# Patient Record
Sex: MALE | Race: WHITE | NOT HISPANIC OR LATINO | Employment: OTHER | URBAN - METROPOLITAN AREA
[De-identification: names, ages, dates, MRNs, and addresses within clinical notes are randomized per-mention and may not be internally consistent; named-entity substitution may affect disease eponyms.]

---

## 2019-12-31 ENCOUNTER — THERAPY VISIT (OUTPATIENT)
Dept: PHYSICAL THERAPY | Facility: CLINIC | Age: 24
End: 2019-12-31
Payer: COMMERCIAL

## 2019-12-31 DIAGNOSIS — M25.511 CHRONIC RIGHT SHOULDER PAIN: Primary | ICD-10-CM

## 2019-12-31 DIAGNOSIS — G89.29 CHRONIC RIGHT SHOULDER PAIN: Primary | ICD-10-CM

## 2019-12-31 PROCEDURE — 97161 PT EVAL LOW COMPLEX 20 MIN: CPT | Mod: GP | Performed by: PHYSICAL THERAPIST

## 2019-12-31 PROCEDURE — 97530 THERAPEUTIC ACTIVITIES: CPT | Mod: GP | Performed by: PHYSICAL THERAPIST

## 2019-12-31 PROCEDURE — 97110 THERAPEUTIC EXERCISES: CPT | Mod: GP | Performed by: PHYSICAL THERAPIST

## 2019-12-31 NOTE — LETTER
[facility] ATHLETIC [facility] PHYSICAL THERAPY  [address]  [facility] [address]  [phone]    January 10, 2020    Re: Chago Soni   :   1995  MRN:  [id number]   REFERRING PHYSICIAN:   Farzana Guzman    [facility] ATHLETIC [facility] PHYSICAL THERAPY  Date of Initial Evaluation:  2019  Visits:    1  Reason for Referral:  Chronic right shoulder pain    [facility] Athletic [facility] Initial Evaluation -- Upper Extremity  Evaluation Date: 2019  Chago Soni is a 24 year old male with a R shd condition.   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 He denies CS pain, stiffness.   Pain quality (sharp/shooting/stabbing/aching/burning/cramping):    Present since (onset date):  Over a year ago while in [employer]. MD order [id number].   Symptoms (improving/unchanging/worsening):  Worsening slowly   Symptoms commenced as a result of: carrying heavy packs on R arm.  Condition occurred in the following environment: unknown  Symptoms at onset:  As above   Paresthesia (yes/no):  no  Spinal history: none     Cough/Sneeze (pos/neg):  neg  Constant symptoms: yes  Intermittent symptoms:   Symptoms are worse with the following: Other - shd retraction, extension, IR, out to side, OH reach- always.    Symptoms are better with the following: nothing  Continued use makes the pain (better/worse/no effect):   Disturbed night (yes/no):  no  Pain at rest (yes/no):     Site (neck/shoulder/elbow/wrist/hand): shd  Other questions (swelling/catching/clicking/locking/subluxing):  ACJ clicking  ocassionally  Previous episodes: none  Previous treatments: 3 different chiros   Re: Chago Soni   :   1995    Specific Questions:  General health (excellent/good/fair/poor): good  Pertinent medical history includes: None  Medications (nil/NSAIDS/analg/steroids/anticoag/other):  None  Medical allergies:  none  Imaging (None/Xray/MRI/Other): none  Recent or major surgery (yes/no): no  Night pain (yes/no): no  Accidents (yes/no): no  Unexplained weight loss (yes/no): no  Barriers at home: none  Other red flags: none    Sites for physical examination (neck/shoulder/elbow/wrist/hand): shd    EXAMINATION    Posture:  Sitting (good/fair/poor): good   Correction of posture (better/worse/no effect/NA): na  Standing (good/fair/poor): good  Other observations: R glenoid sits anterior   Neurological (NA/motor/sensory/reflexes/dural): na  Baselines (pain or functional activity): retraction - limited tight  Extremities (Shoulder/Elbow/Wrist/Hand): shd  Movement Loss Jb Mod Min Nil Pain   Flexion  153/180   GHJ elevation   Extension  46/53      Abduction  162/180      Internal Rotation  2 inches distal to L      External Rotation  Min loss, dec scap ret      Supination        Pronation        Radial Deviation        Ulnar Deviation        Passive Movement (+/- overpressure)/(PDM/ERP): wnl except IR 90/120  Resisted Test Response (pain): na no time  Other Tests:     Re: Chago Soni   :   1995    Spine:  Movement Loss:   Effect of repeated movements:   Effect of static positioning:   Spine testing (not relevant/relevant/secondary problem): na    Baseline Symptoms: 0/10  Repeated Tests Symptom Response Mechanical Response   Active/Passive movement, resisted test, functional test During - Produce, Abolish, Increase, Decrease, NE After -   Better, Worse, NB, NW, NE Effect -   ? or ? ROM, strength or key functional test No Effect   Stand rep shd extension using counter 2/15 hold 3 sec No Effect  Inc ROM  Better     Inc all ROM: FF to 162 ext 64 abd 162 and IR 1.75 inch loss    Effect of static positioning       Provisional Classification (Extremity/Spine): extremity derangement- shd   Principle of Management:  Education:  Etiology DP pattern #1 HEP POC proper sitting with shd ret using antonella roll placed vertically BB  Equipment provided:  Towel roll  Exercise and dosage:  Stand rep shd ext using counter 1/15 q 2-3 hrs    ASSESSMENT/PLAN:  Patient is a 24 year old male with right side shoulder complaints.    Patient has the following significant findings with corresponding treatment plan.                Diagnosis 1:  shd pain  Pain -  manual therapy, self management, education, directional preference exercise and home program  Decreased ROM/flexibility - manual therapy, therapeutic exercise, therapeutic activity and home program  Decreased joint mobility - manual therapy, therapeutic exercise, therapeutic activity and   home program  Decreased function - therapeutic activities and home program  Impaired posture - neuro re-education, therapeutic activities and home program  Previous and current functional limitations:  (See Goal Flow Sheet for this information)    Short term and Long term goals: (See Goal Flow Sheet for this information)   Communication ability:  Patient appears to be able to clearly communicate and understand verbal and written communication and follow directions correctly.  Treatment Explanation - The following has been discussed with the patient:   RX ordered/plan of care, Anticipated outcomes, Possible risks and side effects              Re: Chago Soni   :   1995    This patient would benefit from PT intervention to resume normal activities.   Rehab potential is good.  Frequency:  1 X week, once daily  Duration:  for 6 weeks  Discharge Plan:  Achieve all LTG.  Independent in home treatment program.  Reach maximal therapeutic benefit.    Thank you for your referral.    INQUIRIES        Juliane  Sb PT, cert. MDT  [facility] [place] [facility]  [address] [facility] [address]  [place] [address]  Phone: [phone]  Fax: [phone]

## 2019-12-31 NOTE — PROGRESS NOTES
[facility] Initial Evaluation -- Upper Extremity    Evaluation Date: December 31, 2019  Chago Soni is a 24 year old male with a R shd condition.   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 He denies CS pain, stiffness.     Pain quality (sharp/shooting/stabbing/aching/burning/cramping):      Present since (onset date):  Over a year ago while in [employer]. MD order [id number].   Symptoms (improving/unchanging/worsening):  Worsening slowly     Symptoms commenced as a result of: carrying heavy packs on R arm.  Condition occurred in the following environment: unknown    Symptoms at onset:  As above Paresthesia (yes/no):  no  Spinal history: none   Cough/Sneeze (pos/neg):  neg    Constant symptoms: yes  Intermittent symptoms:     Symptoms are worse with the following: Other - shd retraction, extension, IR, out to side, OH reach- always.    Symptoms are better with the following: nothing    Continued use makes the pain (better/worse/no effect):     Disturbed night (yes/no):  no    Pain at rest (yes/no):   Site (neck/shoulder/elbow/wrist/hand): shd    Other questions (swelling/catching/clicking/locking/subluxing):  ACJ clicking ocassionally    Previous episodes: none  Previous treatments: 3 different chiros     Specific Questions:  General health (excellent/good/fair/poor): good  Pertinent medical history includes: None  Medications (nil/NSAIDS/analg/steroids/anticoag/other):  None  Medical allergies:  none  Imaging (None/Xray/MRI/Other): none  Recent or major surgery (yes/no): no  Night pain (yes/no): no  Accidents  (yes/no): no  Unexplained weight loss (yes/no): no  Barriers at home: none  Other red flags: none    Sites for physical examination (neck/shoulder/elbow/wrist/hand): shd    EXAMINATION    Posture:  Sitting (good/fair/poor): good   Correction of posture (better/worse/no effect/NA): na  Standing (good/fair/poor): good  Other observations: R glenoid sits anterior     Neurological (NA/motor/sensory/reflexes/dural): na    Baselines (pain or functional activity): retraction - limited tight    Extremities (Shoulder/Elbow/Wrist/Hand): shd    Movement Loss Jb Mod Min Nil Pain   Flexion  153/180   GHJ elevation   Extension  46/53      Abduction  162/180      Internal Rotation  2 inches distal to L      External Rotation  Min loss, dec scap ret      Supination        Pronation        Radial Deviation        Ulnar Deviation           Passive Movement (+/- overpressure)/(PDM/ERP): wnl except IR 90/120  Resisted Test Response (pain): na no time  Other Tests:     Spine:  Movement Loss:   Effect of repeated movements:   Effect of static positioning:   Spine testing (not relevant/relevant/secondary problem): na    Baseline Symptoms: 0/10  Repeated Tests Symptom Response Mechanical Response   Active/Passive movement, resisted test, functional test During - Produce, Abolish, Increase, Decrease, NE After -   Better, Worse, NB, NW, NE Effect -   ? or ? ROM, strength or key functional test No Effect   Stand rep shd extension using counter 2/15 hold 3 sec No Effect  Inc ROM  Better    Inc all ROM: FF to 162 ext 64 abd 162 and IR 1.75 inch loss                               Effect of static positioning                    Provisional Classification (Extremity/Spine): extremity derangement- shd     Principle of Management:  Education:  Etiology DP pattern #1 HEP POC proper sitting with shd ret using antonella roll placed vertically BB  Equipment provided:  Towel roll  Exercise and dosage:  Stand rep shd ext using counter 1/15 q 2-3  hrs    ASSESSMENT/PLAN:    Patient is a 24 year old male with right side shoulder complaints.    Patient has the following significant findings with corresponding treatment plan.                Diagnosis 1:  shd pain  Pain -  manual therapy, self management, education, directional preference exercise and home program  Decreased ROM/flexibility - manual therapy, therapeutic exercise, therapeutic activity and home program  Decreased joint mobility - manual therapy, therapeutic exercise, therapeutic activity and home program  Decreased function - therapeutic activities and home program  Impaired posture - neuro re-education, therapeutic activities and home program        Previous and current functional limitations:  (See Goal Flow Sheet for this information)    Short term and Long term goals: (See Goal Flow Sheet for this information)     Communication ability:  Patient appears to be able to clearly communicate and understand verbal and written communication and follow directions correctly.  Treatment Explanation - The following has been discussed with the patient:   RX ordered/plan of care  Anticipated outcomes  Possible risks and side effects  This patient would benefit from PT intervention to resume normal activities.   Rehab potential is good.    Frequency:  1 X week, once daily  Duration:  for 6 weeks  Discharge Plan:  Achieve all LTG.  Independent in home treatment program.  Reach maximal therapeutic benefit.    Please refer to the daily flowsheet for treatment today, total treatment time and time spent performing 1:1 timed codes.

## 2020-01-03 PROBLEM — M25.511 CHRONIC RIGHT SHOULDER PAIN: Status: ACTIVE | Noted: 2020-01-03

## 2020-01-03 PROBLEM — G89.29 CHRONIC RIGHT SHOULDER PAIN: Status: ACTIVE | Noted: 2020-01-03

## 2020-03-23 PROBLEM — M25.511 CHRONIC RIGHT SHOULDER PAIN: Status: RESOLVED | Noted: 2020-01-03 | Resolved: 2020-03-23

## 2020-03-23 PROBLEM — G89.29 CHRONIC RIGHT SHOULDER PAIN: Status: RESOLVED | Noted: 2020-01-03 | Resolved: 2020-03-23

## 2020-08-25 ENCOUNTER — THERAPY VISIT (OUTPATIENT)
Dept: PHYSICAL THERAPY | Facility: CLINIC | Age: 25
End: 2020-08-25
Attending: FAMILY MEDICINE
Payer: OTHER GOVERNMENT

## 2020-08-25 ENCOUNTER — OFFICE VISIT (OUTPATIENT)
Dept: FAMILY MEDICINE | Facility: CLINIC | Age: 25
End: 2020-08-25
Payer: OTHER GOVERNMENT

## 2020-08-25 VITALS
TEMPERATURE: 98 F | WEIGHT: 176 LBS | HEIGHT: 71 IN | HEART RATE: 70 BPM | DIASTOLIC BLOOD PRESSURE: 68 MMHG | BODY MASS INDEX: 24.64 KG/M2 | SYSTOLIC BLOOD PRESSURE: 118 MMHG

## 2020-08-25 DIAGNOSIS — M25.511 CHRONIC RIGHT SHOULDER PAIN: ICD-10-CM

## 2020-08-25 DIAGNOSIS — J30.2 SEASONAL ALLERGIC RHINITIS, UNSPECIFIED TRIGGER: ICD-10-CM

## 2020-08-25 DIAGNOSIS — L30.9 ECZEMA, UNSPECIFIED TYPE: ICD-10-CM

## 2020-08-25 DIAGNOSIS — M25.511 RIGHT SHOULDER PAIN, UNSPECIFIED CHRONICITY: ICD-10-CM

## 2020-08-25 DIAGNOSIS — Z11.3 SCREEN FOR STD (SEXUALLY TRANSMITTED DISEASE): ICD-10-CM

## 2020-08-25 DIAGNOSIS — G89.29 CHRONIC RIGHT SHOULDER PAIN: ICD-10-CM

## 2020-08-25 DIAGNOSIS — Z00.00 ROUTINE GENERAL MEDICAL EXAMINATION AT A HEALTH CARE FACILITY: Primary | ICD-10-CM

## 2020-08-25 PROCEDURE — 36415 COLL VENOUS BLD VENIPUNCTURE: CPT | Performed by: FAMILY MEDICINE

## 2020-08-25 PROCEDURE — 87340 HEPATITIS B SURFACE AG IA: CPT | Performed by: FAMILY MEDICINE

## 2020-08-25 PROCEDURE — 87491 CHLMYD TRACH DNA AMP PROBE: CPT | Performed by: FAMILY MEDICINE

## 2020-08-25 PROCEDURE — 99214 OFFICE O/P EST MOD 30 MIN: CPT | Mod: 25 | Performed by: FAMILY MEDICINE

## 2020-08-25 PROCEDURE — 97112 NEUROMUSCULAR REEDUCATION: CPT | Mod: GP | Performed by: PHYSICAL THERAPIST

## 2020-08-25 PROCEDURE — 87591 N.GONORRHOEAE DNA AMP PROB: CPT | Performed by: FAMILY MEDICINE

## 2020-08-25 PROCEDURE — 97161 PT EVAL LOW COMPLEX 20 MIN: CPT | Mod: GP | Performed by: PHYSICAL THERAPIST

## 2020-08-25 PROCEDURE — 87389 HIV-1 AG W/HIV-1&-2 AB AG IA: CPT | Performed by: FAMILY MEDICINE

## 2020-08-25 PROCEDURE — 86780 TREPONEMA PALLIDUM: CPT | Performed by: FAMILY MEDICINE

## 2020-08-25 PROCEDURE — 99385 PREV VISIT NEW AGE 18-39: CPT | Performed by: FAMILY MEDICINE

## 2020-08-25 RX ORDER — TRIAMCINOLONE ACETONIDE 1 MG/G
CREAM TOPICAL 2 TIMES DAILY
Qty: 85.2 G | Refills: 1 | Status: SHIPPED | OUTPATIENT
Start: 2020-08-25 | End: 2021-02-15

## 2020-08-25 ASSESSMENT — MIFFLIN-ST. JEOR: SCORE: 1805.46

## 2020-08-25 NOTE — PROGRESS NOTES
3  SUBJECTIVE:   CC: Chago Soni is an 25 year old male who presents for preventive health visit.     Healthy Habits:    Do you get at least three servings of calcium containing foods daily (dairy, green leafy vegetables, etc.)? yes    Amount of exercise or daily activities, outside of work: 7 day(s) per week    Problems taking medications regularly No    Medication side effects: No    Have you had an eye exam in the past two years? yes    Do you see a dentist twice per year? yes    Do you have sleep apnea, excessive snoring or daytime drowsiness?no    Was donating plasma at [facility]. Was told he had a  False positive Hepatitis C. Would like retesting today.     [employer]iginally [place]    seasal allergies, over-the-counter meds -managing it well      Joint Pain    Onset: at least two years, began with tension. Was seen for it about 1.5 years ago. Saw PT. Insurance changed and he didn't continue following up. Now works for [employer] full time and PT can be covered by [employer].     Description:   Location: right shoulder  Character: usually its an ache, forward and upwards he feels like he gets stuck ,     Intensity: moderate, issue is becoming severe     Progression of Symptoms: worse    Accompanying Signs & Symptoms:  Other symptoms: will radiate into nbeck in down into upper pectoral     History:   Previous similar pain: YES      Precipitating factors:   Trauma or overuse: no     Alleviating factors:  Improved by: not much has helped.     Right shoulder rotated up and stuck    No real injury  Overtime  Right handed  Pain more on the top of shoulder , sometimes on the pec and also   range of motion is affected , backward rotation, lorenzo back    He Is weightlifting  He has been doing tones of back and shoulder exercises      Shots uptdate    Today's PHQ-2 Score:   PHQ-2 ( 1999 [employer]) 8/25/2020   Q1: Little interest or pleasure in doing things 0   Q2: Feeling down, depressed or hopeless 0  "  PHQ-2 Score 0       Abuse: Current or Past(Physical, Sexual or Emotional)- No  Do you feel safe in your environment? Yes        Social History     Tobacco Use     Smoking status: Never Smoker     Smokeless tobacco: Never Used   Substance Use Topics     Alcohol use: Yes     If you drink alcohol do you typically have >3 drinks per day or >7 drinks per week? No                      Last PSA: No results found for: PSA    Reviewed orders with patient. Reviewed health maintenance and updated orders accordingly - Yes  Labs reviewed in EPIC    Reviewed and updated as needed this visit by clinical staff  Tobacco  Allergies  Med Hx  Surg Hx  Fam Hx  Soc Hx        Reviewed and updated as needed this visit by Provider        History reviewed. No pertinent past medical history.   History reviewed. No pertinent surgical history.  OB History   No obstetric history on file.       ROS:  CONSTITUTIONAL: NEGATIVE for fever, chills, change in weight  INTEGUMENTARY/SKIN: NEGATIVE for worrisome rashes, moles or lesions  EYES: NEGATIVE for vision changes or irritation  ENT: NEGATIVE for ear, mouth and throat problems  RESP: NEGATIVE for significant cough or SOB  CV: NEGATIVE for chest pain, palpitations or peripheral edema  GI: NEGATIVE for nausea, abdominal pain, heartburn, or change in bowel habits   male: negative for dysuria, hematuria, decreased urinary stream, erectile dysfunction, urethral discharge  MUSCULOSKELETAL: NEGATIVE for significant arthralgias or myalgia  NEURO: NEGATIVE for weakness, dizziness or paresthesias  PSYCHIATRIC: NEGATIVE for changes in mood or affect    OBJECTIVE:   /68   Pulse 70   Temp 98  F (36.7  C) (Oral)   Ht 1.803 m (5' 11\")   Wt 79.8 kg (176 lb)   BMI 24.55 kg/m    EXAM:  GENERAL: healthy, alert and no distress  EYES: Eyes grossly normal to inspection, PERRL and conjunctivae and sclerae normal  HENT: ear canals and TM's normal, nose and mouth without ulcers or lesions  NECK: no " "adenopathy, no asymmetry, masses, or scars and thyroid normal to palpation  RESP: lungs clear to auscultation - no rales, rhonchi or wheezes  CV: regular rate and rhythm, normal S1 S2, no S3 or S4, no murmur, click or rub, no peripheral edema and peripheral pulses strong  ABDOMEN: soft, nontender, no hepatosplenomegaly, no masses and bowel sounds normal  MS: right shoulder restrictions with motion, normal strength, no numbness, no gross musculoskeletal defects noted, no edema  SKIN: mild patches of rash on arms, no suspicious lesions or rashes  NEURO: Normal strength and tone, mentation intact and speech normal  PSYCH: mentation appears normal, affect normal/bright    Diagnostic Test Results:  Labs reviewed in Epic    ASSESSMENT/PLAN:       ICD-10-CM    1. Routine general medical examination at a health care facility  Z00.00    2. Right shoulder pain, unspecified chronicity  M25.511 [facility]     Orthopedic & Spine  Referral   3. Screen for STD (sexually transmitted disease)  Z11.3 **Hepatitis C Screen Reflex to RNA FUTURE anytime     Treponema Abs w Reflex to RPR and Titer     Chlamydia trachomatis PCR     Neisseria gonorrhoeae PCR     HIV Antigen Antibody Combo     Hepatitis B surface antigen   4. Eczema, unspecified type  L30.9    5. Seasonal allergic rhinitis, unspecified trigger  J30.2      New to this clinic and provider  Chronic shoulder pain/decrease range of motion-no acute pain, start with physical therapy and follow up with  Sports med doc    History of eczma-advised moisturizers and use prn hydrocortisone , not resolving so will do triamcinolone  Std screening  Seasonal allergies-stable, cont over-the-counter meds      COUNSELING:  Reviewed preventive health counseling, as reflected in patient instructions    Estimated body mass index is 24.55 kg/m  as calculated from the following:    Height as of this encounter: 1.803 m (5' 11\").    Weight as of this encounter: " 79.8 kg (176 lb).         reports that he has never smoked. He has never used smokeless tobacco.      Counseling Resources:  ATP IV Guidelines  Pooled Cohorts Equation Calculator  FRAX Risk Assessment  ICSI Preventive Guidelines  Dietary Guidelines for Americans, 2010  USDA's MyPlate  ASA Prophylaxis  Lung CA Screening    Marlon Bowser DO  [facility]

## 2020-08-25 NOTE — LETTER
[facility]  [address] [id number] [phone]                                                                                                August 31, 2020    Chago Soni  [address] [address]        Dear Mr. Soni,    Your recent lab results were within normal limits. A copy of those results are included with this letter.        Sincerely,      Marlon Bowser DO/hl    Results for orders placed or performed in visit on 08/25/20   Treponema Abs w Reflex to RPR and Titer     Status: None   Result Value Ref Range    Treponema Antibodies Nonreactive NR^Nonreactive   HIV Antigen Antibody Combo     Status: None   Result Value Ref Range    HIV Antigen Antibody Combo Nonreactive NR^Nonreactive       Hepatitis B surface antigen     Status: None   Result Value Ref Range    Hep B Surface Agn Nonreactive NR^Nonreactive   Chlamydia trachomatis PCR     Status: None    Specimen: Urine   Result Value Ref Range    Specimen Description Urine     Chlamydia Trachomatis PCR Negative NEG^Negative   Neisseria gonorrhoeae PCR     Status: None    Specimen: Urine   Result Value Ref Range    Specimen Descrip Urine     N Gonorrhea PCR Negative NEG^Negative

## 2020-08-25 NOTE — PROGRESS NOTES
"[facility] Initial Evaluation  Subjective:  The history is provided by the patient. No  was used.   Therapist Generated HPI Evaluation  Problem details: Pt reports about two years of shoulder pain without specific incident.  Feels like shoulder is \"rotated forward and up a little bit.\"  Feels like ROM is limited and is \"stuck.\"  Does quite a bit of weight lifting and has difficulty with both front side and back side exercises.  Referred to PT 08/25/2020.         Type of problem:  Right shoulder.    This is a chronic condition.  Condition occurred with:  Unknown cause.    Patient reports pain:  Anterior.        Exacerbated by: lifting, carrying, reaching behind the back.  Relieved by: pec stretch, scapular exercises.          Patient Health History  Chago Soni being seen for shoulder misalignment.       Problem occurred:  training   Pain is reported as 2/10 on pain scale.  General health as reported by patient is excellent.  Pertinent medical history includes: none.   Red flags:  None as reported by patient.  Medical allergies: none.   Surgeries include:  None.    Current medications:  None.    Current occupation is .   Primary job tasks include:  Driving and lifting/carrying.                Pt is R dominant.  Pt states his goals are to \"not feel the discomfort.\"                    Objective:  Standing Alignment:    Cervical/Thoracic:  Forward head  Shoulder/UE:  Rounded shoulders (medial border winging R scapula compared to L)                                  Cervical/Thoracic Evaluation  Arom wnl cervical: without gross restriction or symptom provocation.                                  Shoulder Evaluation:  ROM:  AROM:    Flexion:  Left:  165    Right:  140 \"pinching\"  Extension: Left: 59Right: 53 positive  Abduction:  Left: 155   Right:  133 \"tiny bit of a pinch\"    Internal Rotation:  Left:  T6    Right:  T9  External Rotation:  Left:  50    Right:  " "50 \"like it has less motion\"                PROM:    Flexion:  Right: 150 \"like I couldn't do it\"      Abduction:  Right:  150 with discomfort                          Strength:    Flexion: Left:5/5    Pain: -    Right: 5/5      Pain:  -  Extension:  Left: 5/5     Pain:-    Right: 5/5     Pain:-  Abduction:  Left: 5/5   Pain:-    Right:/5     Pain:-    Internal Rotation:  Left:5/5      Pain:-    Right: 5/5      Pain:-  External Rotation:   Left:5/5      Pain:-   Right:5-/5      Pain:-            Stability Testing:      Left shoulder stability negative testing:  Apprehension and Relocation    Right shoulder stability negative testing:  Apprehension and Relocation    Palpation:  normal      Mobility Tests:    Glenohumeral anterior left:  Normal  Glenohumeral anterior right:  Normal  Glenohumeral posterior left:  Normal  Glenohumeral posterior right:  Hypomobile  Glenohumeral inferior left:  Normal  Glenohumeral inferior right:  Normal                                         Serratus anterior MMT L 5/5, R 4/5 without discomfort    General     ROS    Assessment/Plan:    Patient is a 25 year old male with right side shoulder complaints.    Patient has the following significant findings with corresponding treatment plan.                Diagnosis 1:  R shoulder pain with serratus anterior weakness  Pain -  hot/cold therapy  Decreased ROM/flexibility - manual therapy and therapeutic exercise  Decreased strength - therapeutic exercise and therapeutic activities  Impaired muscle performance - neuro re-education  Decreased function - therapeutic activities  Impaired posture - neuro re-education    Therapy Evaluation Codes:   1) History comprised of:   Personal factors that impact the plan of care:      Overall behavior pattern and Time since onset of symptoms.    Comorbidity factors that impact the plan of care are:      None.     Medications impacting care: None.  2) Examination of Body Systems comprised of:   Body structures " and functions that impact the plan of care:      Shoulder.   Activity limitations that impact the plan of care are:      Lifting.  3) Clinical presentation characteristics are:   Stable/Uncomplicated.  4) Decision-Making    Low complexity using standardized patient assessment instrument and/or measureable assessment of functional outcome.  Cumulative Therapy Evaluation is: Low complexity.    Previous and current functional limitations:  (See Goal Flow Sheet for this information)    Short term and Long term goals: (See Goal Flow Sheet for this information)     Communication ability:  Patient appears to be able to clearly communicate and understand verbal and written communication and follow directions correctly.  Treatment Explanation - The following has been discussed with the patient:   RX ordered/plan of care  Anticipated outcomes  Possible risks and side effects  This patient would benefit from PT intervention to resume normal activities.   Rehab potential is good.    Frequency:  1 X week, once daily  Duration:  for 6 weeks  Discharge Plan:  Achieve all LTG.  Independent in home treatment program.  Reach maximal therapeutic benefit.    Please refer to the daily flowsheet for treatment today, total treatment time and time spent performing 1:1 timed codes.

## 2020-08-25 NOTE — PATIENT INSTRUCTIONS
HPV shots  Flu shot in the fall  Follow up with physical therapy and sports med  Consider going to [facility] for physical therapy if this place isnt working out for you  Marlon Bowser D.O.    Patient Education         Preventive Health Recommendations  Male Ages 21 - 25     Yearly exam:             See your health care provider every year in order to  o   Review health changes.   o   Discuss preventive care.    o   Review your medicines if your doctor has prescribed any.    You should be tested each year for STDs (sexually transmitted diseases).     Talk to your provider about cholesterol testing.      If you are at risk for diabetes, you should have a diabetes test (fasting glucose).    Shots: Get a flu shot each year. Get a tetanus shot every 10 years.     Nutrition:    Eat at least 5 servings of fruits and vegetables daily.     Eat whole-grain bread, whole-wheat pasta and brown rice instead of white grains and rice.     Get adequate calcium and Vitamin D.     Lifestyle    Exercise for at least 150 minutes a week (30 minutes a day, 5 days a week). This will help you control your weight and prevent disease.     Limit alcohol to one drink per day.     No smoking.     Wear sunscreen to prevent skin cancer.     See your dentist every six months for an exam and cleaning.

## 2020-08-26 LAB
C TRACH DNA SPEC QL NAA+PROBE: NEGATIVE
HBV SURFACE AG SERPL QL IA: NONREACTIVE
HIV 1+2 AB+HIV1 P24 AG SERPL QL IA: NONREACTIVE
N GONORRHOEA DNA SPEC QL NAA+PROBE: NEGATIVE
SPECIMEN SOURCE: NORMAL
SPECIMEN SOURCE: NORMAL
T PALLIDUM AB SER QL: NONREACTIVE

## 2020-08-31 ENCOUNTER — THERAPY VISIT (OUTPATIENT)
Dept: PHYSICAL THERAPY | Facility: CLINIC | Age: 25
End: 2020-08-31
Payer: OTHER GOVERNMENT

## 2020-08-31 DIAGNOSIS — G89.29 CHRONIC RIGHT SHOULDER PAIN: ICD-10-CM

## 2020-08-31 DIAGNOSIS — M25.511 CHRONIC RIGHT SHOULDER PAIN: ICD-10-CM

## 2020-08-31 PROCEDURE — 97112 NEUROMUSCULAR REEDUCATION: CPT | Mod: GP | Performed by: PHYSICAL THERAPIST

## 2020-09-09 ENCOUNTER — THERAPY VISIT (OUTPATIENT)
Dept: PHYSICAL THERAPY | Facility: CLINIC | Age: 25
End: 2020-09-09
Payer: OTHER GOVERNMENT

## 2020-09-09 DIAGNOSIS — M25.511 CHRONIC RIGHT SHOULDER PAIN: ICD-10-CM

## 2020-09-09 DIAGNOSIS — G89.29 CHRONIC RIGHT SHOULDER PAIN: ICD-10-CM

## 2020-09-09 PROCEDURE — 97140 MANUAL THERAPY 1/> REGIONS: CPT | Mod: GP | Performed by: PHYSICAL THERAPIST

## 2020-09-09 PROCEDURE — 97112 NEUROMUSCULAR REEDUCATION: CPT | Mod: GP | Performed by: PHYSICAL THERAPIST

## 2020-09-09 NOTE — PROGRESS NOTES
Subjective:  HPI  Physical Exam                    Objective:  System    Physical Exam    General     ROS    Assessment/Plan:    SUBJECTIVE  Subjective: Pt reports HEP continues to get slowly easier but remains challenging.  Overall seeing some progress as generally feels a little better first thing in the morning.   Current Pain level: No change(but less often)   Changes in function:  None     Adverse reaction to treatment or activity:  None    OBJECTIVE  Objective: Decreased discomfort AROM R shoulder flexion with assistance to scapular upward rotation.     ASSESSMENT  Chago continues to require intervention to meet STG and LTG's: PT  Patient is progressing as expected.  Response to therapy has shown an improvement in symptoms on elevation with manual scapular assistance.  Progress made towards STG/LTG?  None    PLAN  Good response to manual work in clinic.  Continue, weaning manual work as able.    PTA/ATC plan:  N/A    Please refer to the daily flowsheet for treatment today, total treatment time and time spent performing 1:1 timed codes.

## 2020-10-09 PROBLEM — G89.29 CHRONIC RIGHT SHOULDER PAIN: Status: RESOLVED | Noted: 2020-08-25 | Resolved: 2020-10-09

## 2020-10-09 PROBLEM — M25.511 CHRONIC RIGHT SHOULDER PAIN: Status: RESOLVED | Noted: 2020-08-25 | Resolved: 2020-10-09

## 2020-10-09 NOTE — PROGRESS NOTES
Pt last seen in PT 09/09.  He has since no showed and not responded to phone message with no further PT scheduled.  Consider note dated 09/09 to serve as final summary.

## 2020-11-12 ENCOUNTER — OFFICE VISIT (OUTPATIENT)
Dept: FAMILY MEDICINE | Facility: CLINIC | Age: 25
End: 2020-11-12
Payer: OTHER GOVERNMENT

## 2020-11-12 VITALS
SYSTOLIC BLOOD PRESSURE: 114 MMHG | WEIGHT: 184.6 LBS | BODY MASS INDEX: 25.84 KG/M2 | DIASTOLIC BLOOD PRESSURE: 84 MMHG | HEART RATE: 59 BPM | TEMPERATURE: 96.1 F | OXYGEN SATURATION: 99 % | HEIGHT: 71 IN

## 2020-11-12 DIAGNOSIS — L98.9 SKIN LESION: Primary | ICD-10-CM

## 2020-11-12 DIAGNOSIS — L01.00 IMPETIGO: ICD-10-CM

## 2020-11-12 PROCEDURE — 87070 CULTURE OTHR SPECIMN AEROBIC: CPT | Performed by: PHYSICIAN ASSISTANT

## 2020-11-12 PROCEDURE — 87186 SC STD MICRODIL/AGAR DIL: CPT | Performed by: PHYSICIAN ASSISTANT

## 2020-11-12 PROCEDURE — 87077 CULTURE AEROBIC IDENTIFY: CPT | Performed by: PHYSICIAN ASSISTANT

## 2020-11-12 PROCEDURE — 99213 OFFICE O/P EST LOW 20 MIN: CPT | Performed by: PHYSICIAN ASSISTANT

## 2020-11-12 RX ORDER — MUPIROCIN CALCIUM 20 MG/G
CREAM TOPICAL 3 TIMES DAILY
Qty: 15 G | Refills: 11 | Status: SHIPPED | OUTPATIENT
Start: 2020-11-12 | End: 2020-12-27

## 2020-11-12 ASSESSMENT — MIFFLIN-ST. JEOR: SCORE: 1846.72

## 2020-11-12 NOTE — PROGRESS NOTES
"Subjective     Chago Soni is a 25 year old male who presents to clinic today for the following health issues:    HPI         Concern - Nose problem  Onset: 5 days ago  Description: patient has a bump on his nose and it opened up, it did have some pus and now has a scab over it. Patient does not know what causes it.   Intensity: mild  Progression of Symptoms:  worsening  Accompanying Signs & Symptoms: none  Previous history of similar problem: Yes  Precipitating factors:        Worsened by: nothing  Alleviating factors:        Improved by: nothing  Therapies tried and outcome: ointment did not help and the antibiotics did clear it up.     Patient Active Problem List   Diagnosis   (none) - all problems resolved or deleted      Current Outpatient Medications   Medication     amoxicillin-clavulanate (AUGMENTIN) 875-125 MG tablet     mupirocin (BACTROBAN) 2 % external cream     triamcinolone (KENALOG) 0.1 % external cream     No current facility-administered medications for this visit.         Review of Systems   Constitutional, HEENT, cardiovascular, pulmonary, gi and gu systems are negative, except as otherwise noted.      Objective    Pulse 59   Temp 96.1  F (35.6  C) (Tympanic)   Ht 1.807 m (5' 11.14\")   Wt 83.7 kg (184 lb 9.6 oz)   SpO2 99%   BMI 25.64 kg/m    Body mass index is 25.64 kg/m .  Physical Exam   GENERAL: healthy, alert and no distress  NECK: no adenopathy, no asymmetry, masses, or scars and thyroid normal to palpation  SKIN: Left nose he has a 1 cm honey colored crusting keratotic papule             Assessment & Plan     Skin lesion    - mupirocin (BACTROBAN) 2 % external cream; Apply topically 3 times daily (Ok to sub ointment if needed)  - amoxicillin-clavulanate (AUGMENTIN) 875-125 MG tablet; Take 1 tablet by mouth 2 times daily  - Skin Culture Aerobic Bacterial    Impetigo  Will treat. Sent for culture. This is recurrent, per him. Location in the dangerous triangle warrants oral " antibiotics. There are no signs of local cellulitis. Warning symptoms of worsening condition discussed and patient shows good understanding to seek ED care if his facial lesion progresses/changes in such a way to suggest it is becoming cellulitic               No follow-ups on file.    DARIELA TILLMAN PA-C  [facility]

## 2020-11-12 NOTE — LETTER
November 19, 2020      Chago oSni  [address] [id number]          Ross,     The culture grew out nice boring skin bacteria. So, no need for major concerns.     Let me know if you have any questions.     Thanks.   BROOKE Molina, PA-C/pv     Resulted Orders   Skin Culture Aerobic Bacterial   Result Value Ref Range    Specimen Description Skin Nose Left     Special Requests Specimen collected in eSwab transport (white cap)     Culture Micro Moderate growth  Staphylococcus aureus   (A)     Culture Micro (A)      Moderate growth  Staphylococcus epidermidis  Susceptibility testing not routinely done         If you have any questions or concerns, please call the clinic at the number listed above.

## 2020-11-15 LAB
BACTERIA SPEC CULT: ABNORMAL
BACTERIA SPEC CULT: ABNORMAL
Lab: ABNORMAL
SPECIMEN SOURCE: ABNORMAL

## 2020-12-27 ENCOUNTER — OFFICE VISIT (OUTPATIENT)
Dept: URGENT CARE | Facility: URGENT CARE | Age: 25
End: 2020-12-27
Payer: OTHER GOVERNMENT

## 2020-12-27 VITALS
BODY MASS INDEX: 26.34 KG/M2 | RESPIRATION RATE: 16 BRPM | OXYGEN SATURATION: 97 % | SYSTOLIC BLOOD PRESSURE: 125 MMHG | TEMPERATURE: 97.5 F | WEIGHT: 189.6 LBS | DIASTOLIC BLOOD PRESSURE: 75 MMHG | HEART RATE: 49 BPM

## 2020-12-27 DIAGNOSIS — R21 RASH: ICD-10-CM

## 2020-12-27 DIAGNOSIS — L03.211 CELLULITIS OF FACE: Primary | ICD-10-CM

## 2020-12-27 DIAGNOSIS — L98.9 SKIN LESION: ICD-10-CM

## 2020-12-27 PROCEDURE — 99214 OFFICE O/P EST MOD 30 MIN: CPT | Performed by: NURSE PRACTITIONER

## 2020-12-27 RX ORDER — MUPIROCIN CALCIUM 20 MG/G
CREAM TOPICAL 3 TIMES DAILY
Qty: 30 G | Refills: 0 | Status: SHIPPED | OUTPATIENT
Start: 2020-12-27 | End: 2021-02-15

## 2020-12-27 NOTE — PROGRESS NOTES
"SUBJECTIVE:   Chago Soni is a 25 year old male presenting with a chief complaint of   Chief Complaint   Patient presents with     Derm Problem     Reoccuring skin infection on nose came back yesterday. Has been seen for this before 3-4x     He is an established patient of [facility].    Patient was evaluated 11/12/20 in primary care for a bump on his nose that opened and draining. He was treated with Augmentin, Bacroban, and wound culture grew Staph.  Lesion has been present for 2 years intermittently. He works in the  doing [employer] wearing a gas mask that gets sweaty. He initially waited 2 months and his symptoms resolved on their own. Symptoms recurred on his nose in different places and he was treated with cream and antibiotic which helped, until a few months later and symptoms recurred. He has had three outbreaks including current one total over the 2 years. Current symptoms started yesterday and they \"get bad quickly.\" Yesterday he noticed a red nose, swelling, yellow drainage.     Denies fever, chills, pain, erythema in eye, purulent discharge from eyes, photophobia, foreign body sensation, contact lens use.     Also for the past month he has had redness under his right eye that is intermittent and mild. Vaseline has helped with his itching. He does have a history of eczema and seasonal allergies, but those symptoms have been controlled in winter. No new contacts and no contacts with similar rash. No history of MRSA that he is aware of. He is going out of town for 3 weeks and leaving tomorrow.     Problem list, Medication list, Allergies, and Medical history reviewed in EPIC.    ROS:  Review of systems negative except for noted above    OBJECTIVE  /75 (BP Location: Left arm, Patient Position: Sitting, Cuff Size: Adult Large)   Pulse (!) 49   Temp 97.5  F (36.4  C) (Tympanic)   Resp 16   Wt 86 kg (189 lb 9.6 oz)   SpO2 97%   BMI 26.34 kg/m      Physical Exam  Constitutional:       General: " He is not in acute distress.     Appearance: He is not ill-appearing, toxic-appearing or diaphoretic.   Eyes:      General:         Right eye: No discharge.         Left eye: No discharge.      Extraocular Movements: Extraocular movements intact.      Conjunctiva/sclera: Conjunctivae normal.      Pupils: Pupils are equal, round, and reactive to light.   Cardiovascular:      Rate and Rhythm: Regular rhythm. Bradycardia present.      Heart sounds: Normal heart sounds.   Pulmonary:      Effort: Pulmonary effort is normal. No respiratory distress.      Breath sounds: Normal breath sounds. No wheezing, rhonchi or rales.   Lymphadenopathy:      Cervical: No cervical adenopathy.   Skin:     General: Skin is warm and dry.      Capillary Refill: Capillary refill takes less than 2 seconds.      Findings: Erythema and lesion present.      Comments: Mildly swollen, erythematous nose with lesion approximately 3 mm right nare with dried yellow drainage, nontender with palpation. No palpable abscess under skin. Approx 5 macropapular lesions under right eye, not in eyelid without drainage or surrounding erythema   Neurological:      Sensory: No sensory deficit.       ASSESSMENT:      ICD-10-CM    1. Cellulitis of face  L03.211    2. Skin lesion  L98.9 mupirocin (BACTROBAN) 2 % external cream     amoxicillin-clavulanate (AUGMENTIN) 875-125 MG tablet   3. Rash  R21       PLAN:    Discussed option of opening nasal lesion and re-culturing wound vs treating with oral and topical antibiotics that have worked previously, as no drainage to swab currently. Patient declines having nose cultured at this time and would like to proceed with previous treatment. Prescriptions sent to pharmacy for Augmentin twice daily for 7 days and bactroban cream to apply three times daily to nose for 7 days for nasal lesion/cellulitis. Keep clean and dry- avoid touching and picking. Try to apply clean masks.     For nonspecific rash under right eye which does  not extend to eye or eyelid recommended applying bacitracin over the counter ointment daily. Keep clean and dry- avoid touching.    Follow-up with PCP if symptoms persist for 7 days, and sooner if symptoms worsen or new symptoms develop, as he may need to be evaluated in dermatology    Discussed red flag symptoms which warrant immediate visit in emergency room    All questions were answered and patient verbalized understanding. AVS reviewed with patient.     France Galvez DNP, APRN, CNP 12/27/2020 10:13 AM

## 2020-12-27 NOTE — PATIENT INSTRUCTIONS
Patient Education     Cellulitis  Cellulitis is an infection of the deep layers of skin. A break in the skin, such as a cut or scratch, can let bacteria under the skin. If the bacteria get to deep layers of the skin, it can be serious. If not treated, cellulitis can get into the bloodstream and lymph nodes. The infection can then spread throughout the body. This causes serious illness.   Cellulitis causes the affected skin to become red, swollen, warm, and sore. The reddened areas have a visible border. An open sore may leak fluid (pus). You may have a fever, chills, and pain.   Cellulitis is treated with antibiotics taken for 7 to 10 days. An open sore may be cleaned and covered with cool wet gauze. Symptoms should get better 1 to 2 days after treatment is started. Make sure to take all the antibiotics for the full number of days until they are gone. Keep taking the medicine even if your symptoms go away.   Home care  Follow these tips:    Limit the use of the part of your body with cellulitis.     If the infection is on your leg, keep your leg raised while sitting. This helps reduce swelling.    Take all of the antibiotic medicine exactly as directed until it is gone. Don't miss any doses, especially during the first 7 days. Don t stop taking the medicine when your symptoms get better.    Keep the affected area clean and dry.    Wash your hands with soap and clean, running water before and after touching your skin. Anyone else who touches your skin should also wash his or her hands. Don't share towels.  Follow-up care  Follow up with your healthcare provider, or as advised. If your infection doesn't go away on the first antibiotic, your healthcare provider will prescribe a different one.   When to seek medical advice  Call your healthcare provider right away if any of these occur:    Red areas that spread    Swelling or pain that gets worse    Fluid leaking from the skin (pus)    Fever higher of 100.4  F (38.0   C) or higher after 2 days on antibiotics  Sandip last reviewed this educational content on 8/1/2019 2000-2020 [employer]. [address] [place] [address], [place] [address]. All rights reserved. This information is not intended as a substitute for professional medical care. Always follow your healthcare professional's instructions.

## 2021-02-15 ENCOUNTER — OFFICE VISIT (OUTPATIENT)
Dept: FAMILY MEDICINE | Facility: CLINIC | Age: 26
End: 2021-02-15
Payer: OTHER GOVERNMENT

## 2021-02-15 VITALS
HEIGHT: 71 IN | RESPIRATION RATE: 12 BRPM | DIASTOLIC BLOOD PRESSURE: 82 MMHG | HEART RATE: 77 BPM | TEMPERATURE: 98.1 F | WEIGHT: 195.1 LBS | BODY MASS INDEX: 27.31 KG/M2 | SYSTOLIC BLOOD PRESSURE: 130 MMHG

## 2021-02-15 DIAGNOSIS — L71.9 ROSACEA: Primary | ICD-10-CM

## 2021-02-15 DIAGNOSIS — L03.211 CELLULITIS OF FACE: ICD-10-CM

## 2021-02-15 PROCEDURE — 99213 OFFICE O/P EST LOW 20 MIN: CPT | Performed by: PHYSICIAN ASSISTANT

## 2021-02-15 RX ORDER — METRONIDAZOLE 7.5 MG/G
GEL TOPICAL 2 TIMES DAILY
Qty: 45 G | Refills: 1 | Status: SHIPPED | OUTPATIENT
Start: 2021-02-15 | End: 2021-09-27

## 2021-02-15 ASSESSMENT — MIFFLIN-ST. JEOR: SCORE: 1894.48

## 2021-02-15 NOTE — PROGRESS NOTES
"Assessment & Plan     Rosacea  Review of his history and the recurrent nature of these flare-ups is most suggestive of rosacea.  We discussed this skin condition, including triggers to avoid (for him, it would mainly be the alcohol).  Patient educational materials given.    I suggest starting metrogel BID.     He is quite frustrated with the recurrent nature of this rash and would like a referral to derm as well (he needs a referral as he has ).       Will try metrogel and may f/u with derm if needed.     - DERMATOLOGY ADULT REFERRAL; Future  - metroNIDAZOLE (METROGEL) 0.75 % external gel; Apply topically 2 times daily    Cellulitis of face  He has responded well to augmentin in the past and has had positive cultures for staph, so will also use this for short term.    - amoxicillin-clavulanate (AUGMENTIN) 875-125 MG tablet; Take 1 tablet by mouth 2 times daily for 7 days             BMI:   Estimated body mass index is 27.1 kg/m  as calculated from the following:    Height as of this encounter: 1.807 m (5' 11.15\").    Weight as of this encounter: 88.5 kg (195 lb 1.6 oz).           Return in about 4 weeks (around 3/15/2021) for a recheck if symptoms do not improve.    Xiao Bermudez PA-C  [facility] ROSALIND Hawkins is a 25 year old who presents for the following health issues     HPI    Derm Problem       Reoccuring skin infection on nose. X1 day Has been seen for this before 5x (urgent care and family practice)      Patient was evaluated 12/27/20 at  and 11/12/20 in primary care for a bump on his nose that opened and draining. He was treated with Mupirocin calcium 2%, kenalog 0.1%, Augmentin, Bacroban, and wound culture grew Staph.  Lesion has been present for 2 years intermittently. He works in the  doing [employer] wearing a gas mask that gets sweaty. He initially waited 2 months and his symptoms resolved on their own. Symptoms recurred on his nose in different places and " "he was treated with cream and antibiotic which helped, until a few months later and symptoms recurred. He has had 5 outbreaks including current one total over the 2 years. Current symptoms: red nose, swelling, yellow drainage. Afebrile.    *discuss eczema     Ally DeShong CMA    He restarted the bactroban recently.  Rash does respond to antibiotics short term, but then it always seems to come back.  He has not found a trigger for this.     He does drink alcohol 1-2 times per week (white claw/vodka).  No new foods.  Does not eat spicy foods.   He was drinking the day prior to this current flare-up.          Review of Systems   Constitutional, HEENT, cardiovascular, pulmonary, gi and gu systems are negative, except as otherwise noted.      Objective    /82   Pulse 77   Temp 98.1  F (36.7  C) (Tympanic)   Resp 12   Ht 1.807 m (5' 11.15\")   Wt 88.5 kg (195 lb 1.6 oz)   BMI 27.10 kg/m    Body mass index is 27.1 kg/m .  Physical Exam   GENERAL: healthy, alert and no distress  EYES: Eyes grossly normal to inspection, PERRL and conjunctivae and sclerae normal  Nose:  Erythematous, slightly swollen nose.  No papules or pustules, no drainage at this time. (I encouraged him to take a picture for potential v/u visit with derm).   NECK: no adenopathy, no asymmetry, masses, or scars and thyroid normal to palpation                "

## 2021-02-15 NOTE — PATIENT INSTRUCTIONS
I am going to again treat your facial redness with the antibiotic, Augmentin, that has worked in the past for you.     However I suspect that what you have is actually rosacea and can be treated with various topical medications and lifestyle changes.  Please see below.   Take pictures of your nose for the dermatologist.       Patient Education     What Is Rosacea?  Rosacea is a long-term (chronic) skin disease that causes facial redness. Rosacea appears mainly in adults. Light-skinned people who tend to flush are most often affected. It may be made worse by the following:     Sun exposure    Heat    Eating spicy foods    Drinking alcohol    Getting embarrassed  Rosacea is rarely a health risk. But the symptoms of this disease can be painful and hurt your self-image. If you have rosacea, know that treatment and self-care can help.   A disease with changing symptoms  No one knows what causes rosacea. Heredity, flushing, and the presence of mites or bacteria may play a role. Increased blood flow in the facial skin may be involved. So may the use of some medicines. Rosacea has 4 main types of symptoms that affect the skin. They are described below. Some people with rosacea also have problems with their eyes. Your eyelids may be red, swollen, or itchy. You may feel as though there is sand in your eyes. From day to day, symptoms can come and go. They may worsen or improve. They can usually be managed with proper treatment and self-care.   Symptoms of rosacea  Flushing  The central region of the face often flushes. This includes the cheeks, chin, forehead, and nose. The color can range from pink to red. Flushing can often include a burning feeling in the skin, rather than itching. The flushing can come and go. Alcohol or hot drinks can make flushing worse. There are few good treatments for those who have only flushing as the main symptoms of their rosacea. So avoiding triggers is the key.    Dilated blood vessels  Tiny  enlarged (dilated) blood vessels (telangiectasias) may form a web-like pattern on 1 or more parts of the face.   Acne-like lesions  Acne-like lesions appear on the face. They are called papules, pustules, and nodules, and they tend to occur above the nose, on the cheeks, and on the chin. They may come and go. Facial redness and tiny dilated blood vessels tend to persist.   Enlargement of the nose  With severe rosacea, redness and swelling may enlarge the nose (rhinophyma) due to thickening of the skin. Thickened skin may also occur on the forehead, chin, cheeks, and ears. This occurs most often in men.   [employer] last reviewed this educational content on 8/1/2019 2000-2020 [employer]. [address] [place] [address], [place] [address]. All rights reserved. This information is not intended as a substitute for professional medical care. Always follow your healthcare professional's instructions.           Patient Education     Treating Rosacea     Use sunscreen with at least SPF 15 or more every day.    There's no cure for rosacea. But rosacea can be controlled in most cases, particularly with medical treatment to manage your symptoms.   Your healthcare provider may prescribe one or more treatments to put on your skin each day. You may also be given medicines to take by mouth if you have more severe rosacea symptoms. To relieve rosacea eye symptoms, you may need prescription eye drops. Stay away from things that can easily cause your rosacea to flare up. These include spicy foods, alcohol, getting embarrassed, and going from a cold environment to a warm environment.    You may have surgery to fix the more severe forms of rosacea that affect the nose (rhinophyma). The term means the redness and swelling that cause the nose to enlarge.   Your role in medical treatment  How well your treatment works depends partly on you. Follow your healthcare provider s treatment plan. Rosacea symptoms often get better with  medicines. But they tend to get worse again if you stop taking the medicines. If your symptoms continue or get worse, ask about other treatment options, including combinations of treatments.   Rosacea self-care  Besides sticking with your treatment plan, follow these tips to care for your skin:    Wash your face twice a day with a gentle facial cleanser. Rinse your skin well with warm (not hot) water. Pat your skin dry with a cotton towel.    Don t scrub your skin or use sponges, brushes, or other abrasive tools. Doing so can irritate your skin.    Don't use harsh scrubs or astringents. These products can irritate your skin.    If you shave your face, use an electric razor.    Apply sunscreen with at least SPF 15 or more every day. Sun exposure can make rosacea symptoms worse.    Choose skin care products and cosmetics that don't irritate the skin, and are oil-free and fragrance-free. If your rosacea tends to include pimples, look for the word noncomedogenic on your products.    Don't put steroids on the skin sores. Steroids may make rosacea worse.   Next step  Learning about rosacea and treating it early is the first step toward controlling this disease. With proper treatment and self-care, you can manage your symptoms and feel better about your skin. The National Rosacea Society is a great resource for information.   What causes rosacea flare-ups?  It s often hard to pinpoint the factors that cause rosacea flare-ups. Different people can be affected by different triggers. Common triggers include weather extremes, sun exposure, alcoholic or hot beverages, spicy food, physical exertion, stress, illness, some skin products, and medicines. To prevent flare-ups, keep a list of things that seem to make your rosacea worse and try to stay away from them.   [employer] last reviewed this educational content on 8/1/2019 2000-2020 [employer]. [address], [place] [address]. All rights reserved. This  information is not intended as a substitute for professional medical care. Always follow your healthcare professional's instructions.

## 2021-02-16 PROBLEM — J30.2 SEASONAL ALLERGIES: Status: ACTIVE | Noted: 2021-02-16

## 2021-02-16 PROBLEM — L71.9 ROSACEA: Status: ACTIVE | Noted: 2021-02-16

## 2021-03-22 ENCOUNTER — OFFICE VISIT (OUTPATIENT)
Dept: DERMATOLOGY | Facility: CLINIC | Age: 26
End: 2021-03-22
Attending: PHYSICIAN ASSISTANT
Payer: OTHER GOVERNMENT

## 2021-03-22 DIAGNOSIS — L71.9 ROSACEA: ICD-10-CM

## 2021-03-22 DIAGNOSIS — L71.0 PERIORIFICIAL DERMATITIS: ICD-10-CM

## 2021-03-22 DIAGNOSIS — L71.9 ACNE ROSACEA: Primary | ICD-10-CM

## 2021-03-22 PROCEDURE — 99204 OFFICE O/P NEW MOD 45 MIN: CPT | Performed by: DERMATOLOGY

## 2021-03-22 RX ORDER — DOXYCYCLINE 100 MG/1
100 CAPSULE ORAL 2 TIMES DAILY
Qty: 60 CAPSULE | Refills: 1 | Status: SHIPPED | OUTPATIENT
Start: 2021-03-22 | End: 2021-06-07

## 2021-03-22 RX ORDER — MINERAL OIL/HYDROPHIL PETROLAT
OINTMENT (GRAM) TOPICAL PRN
COMMUNITY
End: 2021-11-12

## 2021-03-22 RX ORDER — TACROLIMUS 1 MG/G
OINTMENT TOPICAL
Qty: 30 G | Refills: 11 | Status: SHIPPED | OUTPATIENT
Start: 2021-03-22 | End: 2021-11-12

## 2021-03-22 NOTE — PROGRESS NOTES
[facility] Dermatology Note  Encounter Date: Mar 22, 2021  Office Visit     Dermatology Problem List:  1. Acneiform eruption on the nose - favor rosacea.    - Start doxycycline 100 mg PO BID   - Continue metronidazole 0.75% gel topically to nose   - Start tacrolimus 0.1% ointment once daily to nose and eyelids   - Start using vaseline prn for eye and mouth lesions; discontinue using aquaphor  2.  Eyelid dermatitis. Ddx irritant/contact +/- rosacea/periorificial dermatitis  - doxycycline as above   - Start tacrolimus 0.1% ointment once daily to nose and eyelids   - Start using vaseline prn for eye and mouth lesions; discontinue using aquaphor    ____________________________________________    Assessment & Plan:    # Acneiform eruption on the nose. Favor papulopustular rosacea, though with concurrent periocular and perioral papules considered slightly atypical distribution of periorificial dermatitis. Recommended trial of extended oral antibiotics and topical therapies as below.    - Start doxycycline 100 mg PO BID   - Continue metronidazole 0.75% gel topically to nose   - Start tacrolimus 0.1% ointment once daily to nose and eyelids   - Start using vaseline prn for eye and mouth lesions; discontinue using aquaphor    # Eyelid dermatitis. Ddx irritant / allergic contact dermatitis, though with some more papular, crusted lesions suggestive of periorificial dermatitis.   -  Doxycycline as above   - Start tacrolimus 0.1% ointment once daily to nose and eyelids   - Start using vaseline prn for eye and mouth lesions; discontinue using aquaphor  - Avoid all other unnecessary topical exposures    Procedures Performed:   None    Follow-up: 8 week(s) in-person, or earlier for new or changing lesions    Staff and Medical Student:     Patient was seen and staffed with Dr. Hinds.    Andi Plata, MS3  [facility] [employer]    Staff Physician:  I was present with the medical student who  participated in the service and in the documentation of the note. I have verified the history and personally performed the physical exam and medical decision making. I agree with the assessment and plan of care as documented in the note.     Dieter Hinds MD  Pronouns: he/him/his    Department of Dermatology  [facility]: Phone: [phone], Fax:[phone]  [facility]: Phone: [phone] Fax: [phone]    ____________________________________________    CC: Derm Problem (possible rosacea, redness and inflammation around eyes, sores around nose, mouth)    HPI:  Mr. Chago Soni is a(n) 25 year old male who presents today as a new patient for evaluation of inflammation of the nose and mir-ocular skin. He states that he first experienced swelling of the nasal skin approximately 2 years ago and that he has had 5-6 total episodes that each resolve after several months with a course of oral antibiotics. He states that these episodes typically start as a single pustule or vesicle that enlarges and then bursts, leaving an open lesion with adjacent erythema. His most recent episode was approximately 2 months ago, which was treated with augmentin and topical metronidazole. He also began experiencing swelling and erythema of his R eyelid last September which has persisted despite erythromycin ointment. He notes perioral erythema and scaling that he treats with Aquaphor.    Patient is otherwise feeling well, without additional skin concerns.    Social history: Recently travelled to [place]. 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    Labs:  None reviewed.    Physical Exam:  Vitals: There were no vitals taken for this visit.  SKIN: Focused examination of the face and head was performed.  - Erythema and pustular edema of  the nasal tip  - Erythema and edema inferior to R eyelid  - Erythema and minor scale inferior to angles of mouth bilaterally  - No other lesions of concern on areas examined.     Medications:  Current Outpatient Medications   Medication     metroNIDAZOLE (METROGEL) 0.75 % external gel     mineral oil-hydrophilic petrolatum (AQUAPHOR) external ointment     No current facility-administered medications for this visit.       Past Medical History:   Patient Active Problem List   Diagnosis     Rosacea     Seasonal allergies     No past medical history on file.     CC Xiao Bermudez PA-C  [address] [place] [address] on close of this encounter.

## 2021-03-22 NOTE — NURSING NOTE
Chago Soni's goals for this visit include:   Chief Complaint   Patient presents with     Derm Problem     possible rosacea, redness and inflammation around eyes, sores around nose, mouth       He requests these members of his care team be copied on today's visit information: no    PCP: [facility], [place]    Referring Provider:  Xiao Bermudez PA-C  [address] [place] [address]    There were no vitals taken for this visit.    Do you need any medication refills at today's visit? No    Lindy Rodrigues LPN

## 2021-03-22 NOTE — LETTER
3/22/2021         RE: Chago Soni  [address]  [facility] [address]        Dear Colleague,    Thank you for referring your patient, Chago Soni, to the [facility]. Please see a copy of my visit note below.    [facility] Dermatology Note  Encounter Date: Mar 22, 2021  Office Visit     Dermatology Problem List:  1. Acneiform eruption on the nose - favor rosacea.    - Start doxycycline 100 mg PO BID   - Continue metronidazole 0.75% gel topically to nose   - Start tacrolimus 0.1% ointment once daily to nose and eyelids   - Start using vaseline prn for eye and mouth lesions; discontinue using aquaphor  2.  Eyelid dermatitis. Ddx irritant/contact +/- rosacea/periorificial dermatitis  - doxycycline as above   - Start tacrolimus 0.1% ointment once daily to nose and eyelids   - Start using vaseline prn for eye and mouth lesions; discontinue using aquaphor    ____________________________________________    Assessment & Plan:    # Acneiform eruption on the nose. Favor papulopustular rosacea, though with concurrent periocular and perioral papules considered slightly atypical distribution of periorificial dermatitis. Recommended trial of extended oral antibiotics and topical therapies as below.    - Start doxycycline 100 mg PO BID   - Continue metronidazole 0.75% gel topically to nose   - Start tacrolimus 0.1% ointment once daily to nose and eyelids   - Start using vaseline prn for eye and mouth lesions; discontinue using aquaphor    # Eyelid dermatitis. Ddx irritant / allergic contact dermatitis, though with some more papular, crusted lesions suggestive of periorificial dermatitis.   -  Doxycycline as above   - Start tacrolimus 0.1% ointment once daily to nose and eyelids   - Start using vaseline prn for eye and mouth lesions; discontinue using aquaphor  - Avoid all other unnecessary topical exposures    Procedures Performed:   None    Follow-up: 8 week(s)  in-person, or earlier for new or changing lesions    Staff and Medical Student:     Patient was seen and staffed with Dr. Hinds.    Andi Plata, MS3  [facility] [employer]    Staff Physician:  I was present with the medical student who participated in the service and in the documentation of the note. I have verified the history and personally performed the physical exam and medical decision making. I agree with the assessment and plan of care as documented in the note.     Dieter Hinds MD  Pronouns: he/him/his    Department of Dermatology  [facility]: Phone: [phone], Fax:[phone]  [facility]: Phone: [phone] Fax: [phone]    ____________________________________________    CC: Derm Problem (possible rosacea, redness and inflammation around eyes, sores around nose, mouth)    HPI:  Mr. Chago Soni is a(n) 25 year old male who presents today as a new patient for evaluation of inflammation of the nose and mir-ocular skin. He states that he first experienced swelling of the nasal skin approximately 2 years ago and that he has had 5-6 total episodes that each resolve after several months with a course of oral antibiotics. He states that these episodes typically start as a single pustule or vesicle that enlarges and then bursts, leaving an open lesion with adjacent erythema. His most recent episode was approximately 2 months ago, which was treated with augmentin and topical metronidazole. He also began experiencing swelling and erythema of his R eyelid last September which has persisted despite erythromycin ointment. He notes perioral erythema and scaling that he treats with Aquaphor.    Patient is otherwise feeling well, without additional skin concerns.    Social history: Recently travelled to [place]. In the , but states that his nasal and eye  concerns began after he had been stationed in [place] for some time.    Labs:  None reviewed.    Physical Exam:  Vitals: There were no vitals taken for this visit.  SKIN: Focused examination of the face and head was performed.  - Erythema and pustular edema of the nasal tip  - Erythema and edema inferior to R eyelid  - Erythema and minor scale inferior to angles of mouth bilaterally  - No other lesions of concern on areas examined.     Medications:  Current Outpatient Medications   Medication     metroNIDAZOLE (METROGEL) 0.75 % external gel     mineral oil-hydrophilic petrolatum (AQUAPHOR) external ointment     No current facility-administered medications for this visit.       Past Medical History:   Patient Active Problem List   Diagnosis     Rosacea     Seasonal allergies     No past medical history on file.     CC Xiao Bermudez, PA-C  [address] [place] [address] on close of this encounter.        Again, thank you for allowing me to participate in the care of your patient.        Sincerely,        Dieter Hinds MD

## 2021-04-12 ENCOUNTER — OFFICE VISIT (OUTPATIENT)
Dept: FAMILY MEDICINE | Facility: CLINIC | Age: 26
End: 2021-04-12
Payer: OTHER GOVERNMENT

## 2021-04-12 VITALS
WEIGHT: 197 LBS | BODY MASS INDEX: 27.58 KG/M2 | HEART RATE: 55 BPM | TEMPERATURE: 98.7 F | SYSTOLIC BLOOD PRESSURE: 120 MMHG | DIASTOLIC BLOOD PRESSURE: 79 MMHG | HEIGHT: 71 IN

## 2021-04-12 DIAGNOSIS — Z91.018 FOOD ALLERGY: ICD-10-CM

## 2021-04-12 DIAGNOSIS — L30.9 ECZEMA, UNSPECIFIED TYPE: ICD-10-CM

## 2021-04-12 DIAGNOSIS — J30.2 SEASONAL ALLERGIES: Primary | ICD-10-CM

## 2021-04-12 PROCEDURE — 99214 OFFICE O/P EST MOD 30 MIN: CPT | Performed by: PHYSICIAN ASSISTANT

## 2021-04-12 RX ORDER — MOMETASONE FUROATE 1 MG/G
CREAM TOPICAL DAILY
Qty: 45 G | Refills: 1 | Status: SHIPPED | OUTPATIENT
Start: 2021-04-12 | End: 2021-11-12

## 2021-04-12 RX ORDER — MONTELUKAST SODIUM 10 MG/1
10 TABLET ORAL AT BEDTIME
Qty: 90 TABLET | Refills: 1 | Status: SHIPPED | OUTPATIENT
Start: 2021-04-12 | End: 2023-06-07

## 2021-04-12 RX ORDER — FLUTICASONE PROPIONATE 50 MCG
1 SPRAY, SUSPENSION (ML) NASAL DAILY
Qty: 16 G | Refills: 1 | Status: SHIPPED | OUTPATIENT
Start: 2021-04-12 | End: 2023-06-07

## 2021-04-12 ASSESSMENT — MIFFLIN-ST. JEOR: SCORE: 1900.72

## 2021-04-12 NOTE — PROGRESS NOTES
Assessment & Plan     Seasonal allergies  Start Flonase and Singulair, as patient had success with these medications in the past with no significant side effects. Ross inquired about allergy medication injections, so referral sent to allergist for further evaluation.     - montelukast (SINGULAIR) 10 MG tablet; Take 1 tablet (10 mg) by mouth At Bedtime  - fluticasone (FLONASE) 50 MCG/ACT nasal spray; Spray 1 spray into both nostrils daily  - ALLERGY/ASTHMA ADULT REFERRAL    Food allergy  Start Flonase and Singulair, as patient had success with these medications in the past with no significant side effects. Ross inquired about allergy medication injections, so referral sent to allergist for further evaluation.     - montelukast (SINGULAIR) 10 MG tablet; Take 1 tablet (10 mg) by mouth At Bedtime  - ALLERGY/ASTHMA ADULT REFERRAL    Eczema, unspecified type  Prescribed Elocon for dishydrotic eczema on hands.     - mometasone (ELOCON) 0.1 % external cream; Apply topically daily To hands                 No follow-ups on file.    ANDREW [id number]  The above student acted as scribe and the encounter documented above was performed by myself and the documentation reflects the work I have performed today.  Dana Gotti PA-C  [facility]    Subjective   Ross is a 25 year old who presents for the following health issues  accompanied by his self :    HPI   Ross comes to the clinic today to gain better control of his allergic rhinitis symptoms. He states that he experiences deep throat tightness, cough, congestion and rhinorrhea, and his triggers include pollen, dust mites, pet dander and eggs. He has been formally evaluated by an allergist. Ross states that he has had allergies throughout his life and was previously managing his symptoms with Singulair and Flonase, which provided good control of his symptoms without significant side effects. He states that he experiences less symptoms during the  "winter, but would like to gain control of his symptoms before they start to interfere with his daily functioning now that spring is here. He is currently managing his symptoms with OTC Loratadine (Wallitin). He inquires about allergy medication injections.    He also inquires about dyshidrotic eczema treatment for his hands. He states that he had atopic dermatitis on the flexor aspects of his arms as a child, but has not developed symptoms on the lateral aspects of his fingers.     He currently does not have any pets and lives in a house with mainly hardwood/tile floors (limited carpet). 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    Allergies-requesting allergy medications   Onset/Duration: x long time  Symptoms:   Nasal congestion: YES  Sneezing: YES  Red, itchy eyes: YES  Progression of Symptoms: same  Accompanying Signs & Symptoms:  Cough: no  Wheezing: no  Rash: no  Sinus/facial pain: no  History:   Is it seasonal: in the fall, in the spring and in the summer   History of Asthma: YES as a child   Has allergy testing been done: YES  Precipitating factors:   None  Alleviating factors:  None  Therapies tried and outcome: None        Review of Systems   As above      Objective    /79   Pulse 55   Temp 98.7  F (37.1  C) (Tympanic)   Ht 1.803 m (5' 11\")   Wt 89.4 kg (197 lb)   BMI 27.48 kg/m    Body mass index is 27.48 kg/m .  Physical Exam  Constitutional:       Appearance: Normal appearance.   HENT:      Right Ear: Tympanic membrane, ear canal and external ear normal.      Left Ear: Tympanic membrane, ear canal and external ear normal.      Nose: Nose normal.      Mouth/Throat:      Mouth: Mucous membranes are moist.      Pharynx: Oropharynx is clear.   Eyes:      Conjunctiva/sclera: Conjunctivae normal.      Pupils: Pupils are equal, round, and reactive to light.   Cardiovascular:      Rate and Rhythm: Normal rate and regular rhythm.   Pulmonary:      Effort: Pulmonary effort is normal.      Breath " sounds: Normal breath sounds.   Skin:     Comments: Erythematous vesicles scattered throughout lateral aspects of fingers   Neurological:      Mental Status: He is alert.

## 2021-04-27 ENCOUNTER — OFFICE VISIT (OUTPATIENT)
Dept: ALLERGY | Facility: CLINIC | Age: 26
End: 2021-04-27
Attending: PHYSICIAN ASSISTANT
Payer: OTHER GOVERNMENT

## 2021-04-27 VITALS
DIASTOLIC BLOOD PRESSURE: 82 MMHG | WEIGHT: 196.2 LBS | BODY MASS INDEX: 27.36 KG/M2 | HEART RATE: 59 BPM | SYSTOLIC BLOOD PRESSURE: 126 MMHG | OXYGEN SATURATION: 100 %

## 2021-04-27 DIAGNOSIS — H10.13 ALLERGIC CONJUNCTIVITIS, BILATERAL: ICD-10-CM

## 2021-04-27 DIAGNOSIS — J30.1 SEASONAL ALLERGIC RHINITIS DUE TO POLLEN: Primary | ICD-10-CM

## 2021-04-27 DIAGNOSIS — J30.89 ALLERGIC RHINITIS DUE TO DUST MITE: ICD-10-CM

## 2021-04-27 DIAGNOSIS — T78.1XXA ADVERSE REACTION TO FOOD, INITIAL ENCOUNTER: ICD-10-CM

## 2021-04-27 DIAGNOSIS — J30.81 ALLERGIC RHINITIS DUE TO ANIMALS: ICD-10-CM

## 2021-04-27 PROCEDURE — 99204 OFFICE O/P NEW MOD 45 MIN: CPT | Mod: 25 | Performed by: ALLERGY & IMMUNOLOGY

## 2021-04-27 PROCEDURE — 36415 COLL VENOUS BLD VENIPUNCTURE: CPT | Performed by: ALLERGY & IMMUNOLOGY

## 2021-04-27 PROCEDURE — 86003 ALLG SPEC IGE CRUDE XTRC EA: CPT | Performed by: ALLERGY & IMMUNOLOGY

## 2021-04-27 PROCEDURE — 95004 PERQ TESTS W/ALRGNC XTRCS: CPT | Performed by: ALLERGY & IMMUNOLOGY

## 2021-04-27 PROCEDURE — 86008 ALLG SPEC IGE RECOMB EA: CPT | Mod: 59 | Performed by: ALLERGY & IMMUNOLOGY

## 2021-04-27 RX ORDER — ERYTHROMYCIN 5 MG/G
OINTMENT OPHTHALMIC PRN
COMMUNITY
Start: 2021-01-11 | End: 2021-09-27

## 2021-04-27 RX ORDER — CETIRIZINE HYDROCHLORIDE 10 MG/1
10 TABLET ORAL DAILY
COMMUNITY
End: 2021-09-27

## 2021-04-27 NOTE — PROGRESS NOTES
Dear Dana Gotti PA-C,    Thank you for referring your patient Chago Soni to the Allergy/Immunology Clinic. Chago Soni was seen in the Allergy Clinic at [facility].    Chago Soni is a 25 year old Choose not to Answer male being seen today at the request of Dana Gotti PA-C in consultation for allergies. He reports that he has had allergies his entire life. His triggers include animals, dust, and pollens. He does not typically have symptoms in the winter unless he is in a [place]i area or around animals. His symptoms are generally worse in the spring and early summer. Ross's symptoms include itchy and watery eyes, sneezing, rhinorrhea, nasal congestion, post-nasal drainage, throat clearing/cough, and rashes. He has been managing with over the counter medications but would like something stronger. He was recently prescribed montelukast and fluticasone nasal spray. He had previously been taking loratadine but stopped this when he started the prescription medications. Ross is hoping to discuss whether shots would be a viable option for him.    Ross reports that in 2016 he was tested for allergies to eggs by an allergist in [place]. He tested positive for allergies to eggs and egg yolks. [place] he developed symptoms of severe abdominal pain. He correlated these symptoms to consumption of eggs in raw or lightly cooked forms - scrambled, protein powders, etc. The abdominal pain would start within 20 minutes of eating the eggs. He would force himself to throw up and the pain would resolve. Ross did not have any other associated symptoms including nausea, hives or rash, throat tightness, difficulty swallowing, cough, shortness of breath, wheezing, or dizziness. He has been able to eat baked goods and foods such as pancakes without issue. He also tolerates small amounts of egg in foods such as fried rice but avoids plain eggs and foods such as  che.      PAST MEDICAL HISTORY:  Dyshidrotic eczema    FAMILY HISTORY:  Mother - seasonal allergies  Father - seasonal allergies  Sister - seasonal allergies    History reviewed. No pertinent surgical history.    ENVIRONMENTAL HISTORY: [place] The home is heated with a forced air. They do have central air conditioning. The patient's bedroom is furnished with carpeting in bedroom, allergen mattress cover, allergen pillowcase cover and fabric window coverings.  Pets inside the house include None. There is no history of cockroach or mice infestation. Do you smoke cigarettes or other recreational drugs? No Do you vape or use an e-cigarette? No. There is/are 0 smokers living in the house. There is/are 0 who smoke ecigarettes/vape living in the house. The house does not have a damp basement.     SOCIAL HISTORY:   Chago is employed in the . He lives with his roommate.  His roommate works at a golf shop.    REVIEW OF SYSTEMS:  General: negative for weight gain. negative for weight loss. negative for changes in sleep.   Eyes: negative for itching. negative for redness. negative for tearing/watering. negative for vision changes  Ears: negative for fullness. negative for hearing loss. negative for dizziness.   Nose: positive  for snoring.negative for changes in smell. positive  for drainage. Positive for congestion.  Throat: positive  for hoarseness. positive  for sore throat. negative for trouble swallowing.   Lungs: negative for cough. positive  for shortness of breath.negative for wheezing. negative for sputum production.   Cardiovascular: negative for chest pain. negative for swelling of ankles. negative for fast or irregular heartbeat.   Gastrointestinal: negative for nausea. negative for heartburn. negative for acid reflux.   Musculoskeletal: negative for joint pain. negative for joint stiffness. negative for joint swelling.   Neurologic: negative for seizures.  negative for fainting. negative for weakness.   Psychiatric: negative for changes in mood. negative for anxiety.   Endocrine: negative for cold intolerance. negative for heat intolerance. negative for tremors.   Hematologic: negative for easy bruising. negative for easy bleeding.  Integumentary: positive  for rash. negative for scaling. negative for nail changes.       Current Outpatient Medications:      cetirizine (ZYRTEC) 10 MG tablet, Take 10 mg by mouth daily, Disp: , Rfl:      fluticasone (FLONASE) 50 MCG/ACT nasal spray, Spray 1 spray into both nostrils daily, Disp: 16 g, Rfl: 1     mineral oil-hydrophilic petrolatum (AQUAPHOR) external ointment, Apply topically as needed, Disp: , Rfl:      mometasone (ELOCON) 0.1 % external cream, Apply topically daily To hands, Disp: 45 g, Rfl: 1     montelukast (SINGULAIR) 10 MG tablet, Take 1 tablet (10 mg) by mouth At Bedtime, Disp: 90 tablet, Rfl: 1     tacrolimus (PROTOPIC) 0.1 % external ointment, Apply once daily to rash on the nose and eyelids, Disp: 30 g, Rfl: 11     doxycycline monohydrate (MONODOX) 100 MG capsule, Take 1 capsule (100 mg) by mouth 2 times daily (Patient not taking: Reported on 4/12/2021), Disp: 60 capsule, Rfl: 1     metroNIDAZOLE (METROGEL) 0.75 % external gel, Apply topically 2 times daily (Patient not taking: Reported on 4/12/2021), Disp: 45 g, Rfl: 1  Immunization History   Administered Date(s) Administered     Adeno T4 01/23/2015     Adenovirus, Type 4 and Type 7, Live, Oral 01/23/2015     Anthrax 10/13/2015, 12/23/2015, 05/20/2016     DTaP, Unspecified 1995, 1995, 01/05/1996, 04/15/1997, 02/28/2000     Hep B, Peds or Adolescent 02/28/2000, 03/30/2000, 08/28/2000     HepA-Adult 01/23/2015, 10/13/2015     Hib, Unspecified 1995, 1995, 01/05/1996, 04/15/1997     Influenza Intranasal Vaccine 4 valent 01/23/2015, 11/20/2015     Influenza Vaccine IM > 6 months Valent IIV4 11/28/2014, 11/28/2014, 01/17/2015     MMR  10/11/1996, 03/30/2008     Meningococcal (Menactra ) 01/21/2015     Poliovirus, inactivated (IPV) 1995, 1995, 01/05/1996, 02/28/2000, 01/21/2015     TDAP Vaccine (Adacel) 07/31/2006, 01/21/2015     Typhoid IM 07/11/2015     Allergies   Allergen Reactions     Eggs [Chicken-Derived Products (Egg)]          EXAM:   /82 (BP Location: Right arm, Patient Position: Sitting, Cuff Size: Adult Regular)   Pulse 59   Wt 89 kg (196 lb 3.2 oz)   SpO2 100%   BMI 27.36 kg/m    GENERAL APPEARANCE: alert, cooperative and not in distress  SKIN: no rashes, no lesions  HEAD: atraumatic, normocephalic  EYES: lids and lashes normal, conjunctivae and sclerae clear, pupils equal, round, reactive to light, EOM full and intact  ENT: no scars or lesions, nasal exam showed no discharge, swelling or lesions noted, otoscopy showed external auditory canals clear, tympanic membranes normal, tongue midline and normal, soft palate, uvula, and tonsils normal  NECK: no asymmetry, masses, or scars, supple without significant adenopathy  LUNGS: unlabored respirations, no intercostal retractions or accessory muscle use, clear to auscultation without rales or wheezes  HEART: regular rate and rhythm without murmurs and normal S1 and S2  MUSCULOSKELETAL: no musculoskeletal defects are noted  NEURO: no focal deficits noted  PSYCH: does not appear depressed or anxious    WORKUP: Skin testing    ENVIRONMENTAL PERCUTANEOUS SKIN TESTING: ADULT  [facility] 4/27/2021   Consent Y   Ordering Physician Dr. Torres   Interpreting Physician Dr. Torres   Testing Technician Carly STEELE RN   Location Back   Time start:  8:17 AM   Time End:  8:32 AM   Positive Control: Histatrol*ALK 1 mg/ml 5/7   Negative Control: 50% Glycerin 0   Cat Hair*ALK (10,000 BAU/ml) 5/8   AP Dog Hair/Dander (1:100 w/v) 5/11   Dust Mite p. 30,000 AU/ml 4/6   Dust Mite f. (30,000 AU/ml) 0   Zev (W/F in millimeters) 0   Speedy Grass (100,000 BAU/mL) 3/5   Red [place]edar  (W/F in millimeters) 0   Maple/[place] (W/F in millimeters) 5/7   Hackberry (W/F in millimeters) 3/5   [place] (W/F in millimeters) 3/5   [place] *ALK (W/F in millimeters) 0   American Elm (W/F in millimeters) 3/5   [place] (W/F in millimeters) 0   Black [place] (W/F in millimeters) 5/8   Birch Mix (W/F in millimeters) 3/5   [place] (W/F in millimeters) 3/5   [place] (W/F in millimeters) 3/5   Cocklebur (W/F in millimeters) 0   [place] (W/F in millimeters) 5/8   White [place] (W/F in millimeters) 0   Careless (W/F in millimeters) 3/5   Nettle (W/F in millimeters) 0   English Plantain (W/F in millimeters) 0   Kochia (W/F in millimeters) 0   Lamb's Quarter (W/F in millimeters) 0   Marshelder (W/F in millimeters) 0   Ragweed Mix* ALK (W/F in millimeters) 5/10   Russian Thistle (W/F in millimeters) 0   Sagebrush/Mugwort (W/F in millimeters) 0   Sheep Sorrel (W/F in millimeters) 3/5   Feather Mix* ALK (W/F in millimeters) 0   Penicillium Mix (1:10 w/v) 0   Curvularia spicifera (1:10 w/v) 0   Epicoccum (1:10 w/v) 6/15   Aspergillus fumigatus (1:10 w/v): 0   Alternaria tenius (1:10 w/v) 5/10   H. Cladosporium (1:10 w/v) 0   Phoma herbarum (1:10 w/v) 6/15      FOOD ALLERGEN PERCUTANEOUS SKIN TESTING  [facility]  4/27/2021   Consent Y   Egg White 1:20 (W/F in millimeters) 0      Appropriate response to controls, positive to cat, dog, dust mite, grass, trees, weeds, and molds    ASSESSMENT/PLAN:  Chago Soni is a 25 year old male here for evaluation of allergies.    1. Seasonal allergic rhinitis due to pollen - Ross reports he has had seasonal rhinoconjunctivitis symptoms for several years. He will also experience symptoms in the winter if he is around animals or in a rossi environment. OTC medications have not provided significant relief. Skin testing was notable for sensitization to seasonal and perennial aeroallergens. We discussed continued medication management vs allergen immunotherapy treatment. The risks and  benefits of immunotherapy were reviewed along with traditional and accelerated build schedules. He is interested in proceeding with treatment but would like to review costs and insurance coverage first.    - continue montelukast - 10mg daily  - continue fluticasone nasal spray - 1-2 sprays in each nostril daily  - recommend allergen immunotherapy treatment - will need signed consent and prescription for epinephrine auto-injector prior to starting  - ALLERGY SKIN TESTS,ALLERGENS    2. Allergic rhinitis due to animals - see above    - ALLERGY SKIN TESTS,ALLERGENS    3. Allergic rhinitis due to dust mite - see above    - ALLERGY SKIN TESTS,ALLERGENS    4. Allergic conjunctivitis, bilateral - see above    - ALLERGY SKIN TESTS,ALLERGENS    5. Adverse reaction to food, initial encounter - Ross reports he has developed severe abdominal pain within 20 minutes of eating eggs on several occasions. He had prior testing in 2016 which was reported to be positive though these records were unavailable for review today. He is tolerating baked goods and small amounts of eggs in foods such as fried rice. Skin testing was negative for sensitization to egg today.    - recommend continued avoidance of egg - could consider oral challenge pending lab results  - Allergen egg white IgE  - Egg Components Allergy Panel  - ALLERGY SKIN TESTS,ALLERGENS      Follow-up in 3 months, sooner if needed      Thank you for allowing me to participate in the care of Chago Soni.      Shavonne Torres MD, FAAAAI  Allergy/Immunology  [facility]      Chart documentation done in part with Dragon Voice Recognition Software. Although reviewed after completion, some word and grammatical errors may remain.

## 2021-04-27 NOTE — LETTER
4/27/2021         RE: Chago Soni  [address]  [facility] [address]        Dear Colleague,    Thank you for referring your patient, Chago Soni, to the [facility]. Please see a copy of my visit note below.    Dear Dana Gotti PA-C,    Thank you for referring your patient Chago Soni to the Allergy/Immunology Clinic. Chago Soni was seen in the Allergy Clinic at [facility].    Chago Soni is a 25 year old Choose not to Answer male being seen today at the request of Dana Gotti PA-C in consultation for allergies. He reports that he has had allergies his entire life. His triggers include animals, dust, and pollens. He does not typically have symptoms in the winter unless he is in a [place] area or around animals. His symptoms are generally worse in the spring and early summer. Ross's symptoms include itchy and watery eyes, sneezing, rhinorrhea, nasal congestion, post-nasal drainage, throat clearing/cough, and rashes. He has been managing with over the counter medications but would like something stronger. He was recently prescribed montelukast and fluticasone nasal spray. He had previously been taking loratadine but stopped this when he started the prescription medications. Ross is hoping to discuss whether shots would be a viable option for him.    Ross reports that in 2016 he was tested for allergies to eggs by an allergist in [place]. He tested positive for allergies to eggs and egg yolks. While [place] he developed symptoms of severe abdominal pain. He correlated these symptoms to consumption of eggs in raw or lightly cooked forms - scrambled, protein powders, etc. The abdominal pain would start within 20 minutes of eating the eggs. He would force himself to throw up and the pain would resolve. Ross did not have any other associated symptoms including nausea, hives or rash, throat tightness, difficulty  swallowing, cough, shortness of breath, wheezing, or dizziness. He has been able to eat baked goods and foods such as pancakes without issue. He also tolerates small amounts of egg in foods such as fried rice but avoids plain eggs and foods such as quiche.      PAST MEDICAL HISTORY:  Dyshidrotic eczema    FAMILY HISTORY:  Mother - seasonal allergies  Father - seasonal allergies  Sister - seasonal allergies    History reviewed. No pertinent surgical history.    ENVIRONMENTAL HISTORY: [place] The home is heated with a forced air. They do have central air conditioning. The patient's bedroom is furnished with carpeting in bedroom, allergen mattress cover, allergen pillowcase cover and fabric window coverings.  Pets inside the house include None. There is no history of cockroach or mice infestation. Do you smoke cigarettes or other recreational drugs? No Do you vape or use an e-cigarette? No. There is/are 0 smokers living in the house. There is/are 0 who smoke ecigarettes/vape living in the house. The house does not have a damp basement.     SOCIAL HISTORY:   Chago is employed in the . He lives with his roommate.  His roommate works at a golf shop.    REVIEW OF SYSTEMS:  General: negative for weight gain. negative for weight loss. negative for changes in sleep.   Eyes: negative for itching. negative for redness. negative for tearing/watering. negative for vision changes  Ears: negative for fullness. negative for hearing loss. negative for dizziness.   Nose: positive  for snoring.negative for changes in smell. positive  for drainage. Positive for congestion.  Throat: positive  for hoarseness. positive  for sore throat. negative for trouble swallowing.   Lungs: negative for cough. positive  for shortness of breath.negative for wheezing. negative for sputum production.   Cardiovascular: negative for chest pain. negative for swelling of ankles. negative for fast or  irregular heartbeat.   Gastrointestinal: negative for nausea. negative for heartburn. negative for acid reflux.   Musculoskeletal: negative for joint pain. negative for joint stiffness. negative for joint swelling.   Neurologic: negative for seizures. negative for fainting. negative for weakness.   Psychiatric: negative for changes in mood. negative for anxiety.   Endocrine: negative for cold intolerance. negative for heat intolerance. negative for tremors.   Hematologic: negative for easy bruising. negative for easy bleeding.  Integumentary: positive  for rash. negative for scaling. negative for nail changes.       Current Outpatient Medications:      cetirizine (ZYRTEC) 10 MG tablet, Take 10 mg by mouth daily, Disp: , Rfl:      fluticasone (FLONASE) 50 MCG/ACT nasal spray, Spray 1 spray into both nostrils daily, Disp: 16 g, Rfl: 1     mineral oil-hydrophilic petrolatum (AQUAPHOR) external ointment, Apply topically as needed, Disp: , Rfl:      mometasone (ELOCON) 0.1 % external cream, Apply topically daily To hands, Disp: 45 g, Rfl: 1     montelukast (SINGULAIR) 10 MG tablet, Take 1 tablet (10 mg) by mouth At Bedtime, Disp: 90 tablet, Rfl: 1     tacrolimus (PROTOPIC) 0.1 % external ointment, Apply once daily to rash on the nose and eyelids, Disp: 30 g, Rfl: 11     doxycycline monohydrate (MONODOX) 100 MG capsule, Take 1 capsule (100 mg) by mouth 2 times daily (Patient not taking: Reported on 4/12/2021), Disp: 60 capsule, Rfl: 1     metroNIDAZOLE (METROGEL) 0.75 % external gel, Apply topically 2 times daily (Patient not taking: Reported on 4/12/2021), Disp: 45 g, Rfl: 1  Immunization History   Administered Date(s) Administered     Adeno T4 01/23/2015     Adenovirus, Type 4 and Type 7, Live, Oral 01/23/2015     Anthrax 10/13/2015, 12/23/2015, 05/20/2016     DTaP, Unspecified 1995, 1995, 01/05/1996, 04/15/1997, 02/28/2000     Hep B, Peds or Adolescent 02/28/2000, 03/30/2000, 08/28/2000     HepA-Adult  01/23/2015, 10/13/2015     Hib, Unspecified 1995, 1995, 01/05/1996, 04/15/1997     Influenza Intranasal Vaccine 4 valent 01/23/2015, 11/20/2015     Influenza Vaccine IM > 6 months Valent IIV4 11/28/2014, 11/28/2014, 01/17/2015     MMR 10/11/1996, 03/30/2008     Meningococcal (Menactra ) 01/21/2015     Poliovirus, inactivated (IPV) 1995, 1995, 01/05/1996, 02/28/2000, 01/21/2015     TDAP Vaccine (Adacel) 07/31/2006, 01/21/2015     Typhoid IM 07/11/2015     Allergies   Allergen Reactions     Eggs [Chicken-Derived Products (Egg)]          EXAM:   /82 (BP Location: Right arm, Patient Position: Sitting, Cuff Size: Adult Regular)   Pulse 59   Wt 89 kg (196 lb 3.2 oz)   SpO2 100%   BMI 27.36 kg/m    GENERAL APPEARANCE: alert, cooperative and not in distress  SKIN: no rashes, no lesions  HEAD: atraumatic, normocephalic  EYES: lids and lashes normal, conjunctivae and sclerae clear, pupils equal, round, reactive to light, EOM full and intact  ENT: no scars or lesions, nasal exam showed no discharge, swelling or lesions noted, otoscopy showed external auditory canals clear, tympanic membranes normal, tongue midline and normal, soft palate, uvula, and tonsils normal  NECK: no asymmetry, masses, or scars, supple without significant adenopathy  LUNGS: unlabored respirations, no intercostal retractions or accessory muscle use, clear to auscultation without rales or wheezes  HEART: regular rate and rhythm without murmurs and normal S1 and S2  MUSCULOSKELETAL: no musculoskeletal defects are noted  NEURO: no focal deficits noted  PSYCH: does not appear depressed or anxious    WORKUP: Skin testing    ENVIRONMENTAL PERCUTANEOUS SKIN TESTING: ADULT  [facility] 4/27/2021   Consent Y   Ordering Physician Dr. Torres   Interpreting Physician Dr. Torres   Testing Technician Carly STEELE, RN   Location Back   Time start:  8:17 AM   Time End:  8:32 AM   Positive Control: Histatrol*ALK 1 mg/ml 5/7    Negative Control: 50% Glycerin 0   Cat Hair*ALK (10,000 BAU/ml) 5/8   AP Dog Hair/Dander (1:100 w/v) 5/11   Dust Mite p. 30,000 AU/ml 4/6   Dust Mite f. (30,000 AU/ml) 0   Zev (W/F in millimeters) 0   Speedy Grass (100,000 BAU/mL) 3/5   Red [place] (W/F in millimeters) 0   Maple/[place] (W/F in millimeters) 5/7   Hackberry (W/F in millimeters) 3/5   [place] (W/F in millimeters) 3/5   [place] *ALK (W/F in millimeters) 0   American Elm (W/F in millimeters) 3/5   [place] (W/F in millimeters) 0   Black [place] (W/F in millimeters) 5/8   Birch Mix (W/F in millimeters) 3/5   [place] (W/F in millimeters) 3/5   [place] (W/F in millimeters) 3/5   Cocklebur (W/F in millimeters) 0   [place] (W/F in millimeters) 5/8   White Matthew (W/F in millimeters) 0   Careless (W/F in millimeters) 3/5   Nettle (W/F in millimeters) 0   English Plantain (W/F in millimeters) 0   Kochia (W/F in millimeters) 0   Lamb's Quarter (W/F in millimeters) 0   Marshelder (W/F in millimeters) 0   Ragweed Mix* ALK (W/F in millimeters) 5/10   Russian Thistle (W/F in millimeters) 0   Sagebrush/Mugwort (W/F in millimeters) 0   Sheep Sorrel (W/F in millimeters) 3/5   Feather Mix* ALK (W/F in millimeters) 0   Penicillium Mix (1:10 w/v) 0   Curvularia spicifera (1:10 w/v) 0   Epicoccum (1:10 w/v) 6/15   Aspergillus fumigatus (1:10 w/v): 0   Alternaria tenius (1:10 w/v) 5/10   H. Cladosporium (1:10 w/v) 0   Phoma herbarum (1:10 w/v) 6/15      FOOD ALLERGEN PERCUTANEOUS SKIN TESTING  [facility]  4/27/2021   Consent Y   Egg White 1:20 (W/F in millimeters) 0      Appropriate response to controls, positive to cat, dog, dust mite, grass, trees, weeds, and molds    ASSESSMENT/PLAN:  Chago Soni is a 25 year old male here for evaluation of allergies.    1. Seasonal allergic rhinitis due to pollen - Ross reports he has had seasonal rhinoconjunctivitis symptoms for several years. He will also experience symptoms in the winter if he is around animals or in a  rossi environment. OTC medications have not provided significant relief. Skin testing was notable for sensitization to seasonal and perennial aeroallergens. We discussed continued medication management vs allergen immunotherapy treatment. The risks and benefits of immunotherapy were reviewed along with traditional and accelerated build schedules. He is interested in proceeding with treatment but would like to review costs and insurance coverage first.    - continue montelukast - 10mg daily  - continue fluticasone nasal spray - 1-2 sprays in each nostril daily  - recommend allergen immunotherapy treatment - will need signed consent and prescription for epinephrine auto-injector prior to starting  - ALLERGY SKIN TESTS,ALLERGENS    2. Allergic rhinitis due to animals - see above    - ALLERGY SKIN TESTS,ALLERGENS    3. Allergic rhinitis due to dust mite - see above    - ALLERGY SKIN TESTS,ALLERGENS    4. Allergic conjunctivitis, bilateral - see above    - ALLERGY SKIN TESTS,ALLERGENS    5. Adverse reaction to food, initial encounter - [facility] reports he has developed severe abdominal pain within 20 minutes of eating eggs on several occasions. He had prior testing in 2016 which was reported to be positive though these records were unavailable for review today. He is tolerating baked goods and small amounts of eggs in foods such as fried rice. Skin testing was negative for sensitization to egg today.    - recommend continued avoidance of egg - could consider oral challenge pending lab results  - Allergen egg white IgE  - Egg Components Allergy Panel  - ALLERGY SKIN TESTS,ALLERGENS      Follow-up in 3 months, sooner if needed      Thank you for allowing me to participate in the care of Chago Soni.      Shavonne Torres MD, FAAAAI  Allergy/Immunology  [facility]      Chart documentation done in part with Dragon Voice Recognition Software. Although  reviewed after completion, some word and grammatical errors may remain.      Again, thank you for allowing me to participate in the care of your patient.        Sincerely,        Shavonne Torres MD

## 2021-04-27 NOTE — PATIENT INSTRUCTIONS
If you have any questions regarding your allergies, asthma, or what we discussed during your visit today please call the allergy clinic or contact us via [employer].    [facility] Allergy RN Line: [phone]  [facility] Scheduling Line: [phone]  [facility] Scheduling Line: [phone]    Clinic Schedule:   [place] - Monday, Tuesday, and Thursday  [facility] - Wednesday      These are the billing and diagnosis codes that your insurance company may need to help you determine if allergy shots are covered and what potential out of pocked costs you may have. You may also want to contact the [place] Business Office/Cost of Care Estimate Line at [phone] for more information.    Regular Allergy Shot Visits (weekly for 8 months): 47008    Cluster Accelerated Build (weekly for 8 weeks but approximately 2 hour visits each time): 25824 - rapid desensitization. This could be a single unit or multiple units billed per day depending on the length of your visit.    Allergy Shot Serum: 89824 - the amount of serum in total varies depending on how many allergy shots you will need. At the start of treatment, each injection is 20mL or 20 doses. Treatment consists of a minimum of 1 injection up to a maximum of 4 injections depending on how many allergens are included in the treatment.    Potential Diagnosis Codes:    Allergic Rhinitis Due to Dust Mite - J30.89  Allergic Rhinitis Due to Animals - J30.81  Seasonal Allergic Rhinitis Due to Pollens - J30.1  Allergic Conjunctivitis - H10.13        ENVIRONMENTAL PERCUTANEOUS SKIN TESTING: ADULT  [facility] [facility] 4/27/2021   Consent Y   Ordering Physician Dr. Torres   Interpreting Physician Dr. Torres   Testing Technician Carly STEELE RN   Location Back   Time start:  8:17 AM   Time End:  8:32 AM   Positive Control: Histatrol*ALK 1 mg/ml 5/7   Negative Control: 50% Glycerin 0   Cat Hair*ALK (10,000  BAU/ml) 5/8   AP Dog Hair/Dander (1:100 w/v) 5/11   Dust Mite p. 30,000 AU/ml 4/6   Dust Mite f. (30,000 AU/ml) 0   Zev (W/F in millimeters) 0   Speedy Grass (100,000 BAU/mL) 3/5   Red [place] (W/F in millimeters) 0   Maple/Adair (W/F in millimeters) 5/7   Hackberry (W/F in millimeters) 3/5   [place] (W/F in millimeters) 3/5   [place] *ALK (W/F in millimeters) 0   American Elm (W/F in millimeters) 3/5   Barren (W/F in millimeters) 0   Black [place] (W/F in millimeters) 5/8   Birch Mix (W/F in millimeters) 3/5   [place] (W/F in millimeters) 3/5   [place] (W/F in millimeters) 3/5   Cockle[place] (W/F in millimeters) 0   [place] (W/F in millimeters) 5/8   White [place] (W/F in millimeters) 0   Careless (W/F in millimeters) 3/5   Nettle (W/F in millimeters) 0   English Plantain (W/F in millimeters) 0   Kochia (W/F in millimeters) 0   Lamb's Quarter (W/F in millimeters) 0   Marshelder (W/F in millimeters) 0   Ragweed Mix* ALK (W/F in millimeters) 5/10   Russian Thistle (W/F in millimeters) 0   Sagebrush/Mugwort (W/F in millimeters) 0   Sheep Sorrel (W/F in millimeters) 3/5   Feather Mix* ALK (W/F in millimeters) 0   Penicillium Mix (1:10 w/v) 0   Curvularia spicifera (1:10 w/v) 0   Epicoccum (1:10 w/v) 6/15   Aspergillus fumigatus (1:10 w/v): 0   Alternaria tenius (1:10 w/v) 5/10   H. Cladosporium (1:10 w/v) 0   Phoma herbarum (1:10 w/v) 6/15      FOOD ALLERGEN PERCUTANEOUS SKIN TESTING  [facility]  4/27/2021   Consent Y   Egg White 1:20 (W/F in millimeters) 0

## 2021-04-28 LAB
EGG WHITE IGE QN: 0.38 KU(A)/L
OVALB IGE QN: <0.1 KU(A)/L
OVOMUCOID IGE QN: <0.1 KU(A)/L

## 2021-04-29 ENCOUNTER — TELEPHONE (OUTPATIENT)
Dept: ALLERGY | Facility: CLINIC | Age: 26
End: 2021-04-29

## 2021-04-29 NOTE — TELEPHONE ENCOUNTER
Please call patient regarding lab results. Egg IgE level is mildly elevated at 0.38 however both ovalbumin and ovomucoid protein IgE levels are negative. He may continue to avoid however I would recommend an observed food challenge if he wishes to introduce eggs back into his diet.

## 2021-05-27 DIAGNOSIS — J30.89 ALLERGIC RHINITIS DUE TO DUST MITE: ICD-10-CM

## 2021-05-27 DIAGNOSIS — H10.13 ALLERGIC CONJUNCTIVITIS, BILATERAL: ICD-10-CM

## 2021-05-27 DIAGNOSIS — J30.1 SEASONAL ALLERGIC RHINITIS DUE TO POLLEN: ICD-10-CM

## 2021-05-27 DIAGNOSIS — J30.89 ALLERGIC RHINITIS DUE TO MOLD: ICD-10-CM

## 2021-05-27 DIAGNOSIS — J30.81 ALLERGIC RHINITIS DUE TO ANIMALS: Primary | ICD-10-CM

## 2021-05-27 NOTE — PROGRESS NOTES
ALLERGY SOLUTION NEW REQUEST    Chago Soni 1995 MRN: [id number]    DATE NEEDED:  2 weeks  Vial Color   Content     Top Dose         Vial Size  Green 1:1,000, Blue 1:100, Yellow 1:10 and Red 1:1  Cat, Molds     Red 1:1 0.5   5mL  Green 1:1,000, Blue 1:100, Yellow 1:10 and Red 1:1  Dog, Grass, Dust Mite, Weeds  Red 1:1 0.5   5mL  Green 1:1,000, Blue 1:100, Yellow 1:10 and Red 1:1  Trees      Red 1:1 0.5   5mL      Shot Clinic Location:  [place]  Ship to Location: [place]  Billing Location: [place]  Special Instructions:  Please bill per cluster protocol        Requester Signature  Shavonne Torres MD

## 2021-06-02 DIAGNOSIS — H10.13 ALLERGIC CONJUNCTIVITIS, BILATERAL: ICD-10-CM

## 2021-06-02 DIAGNOSIS — J30.89 ALLERGIC RHINITIS DUE TO MOLD: ICD-10-CM

## 2021-06-02 DIAGNOSIS — J30.81 ALLERGIC RHINITIS DUE TO ANIMALS: Primary | ICD-10-CM

## 2021-06-02 PROCEDURE — 95165 ANTIGEN THERAPY SERVICES: CPT | Performed by: ALLERGY & IMMUNOLOGY

## 2021-06-02 NOTE — PROGRESS NOTES
Allergy serums billed to [facility].     Vials billed below:    Vial Color  Content                      Vial Size Expiration   Green 1:1,000, Cat, Molds               5mL  9/2/2021   Blue 1:100,                    Cat, Molds               5mL  12/2/2021  Yellow 1:10   Cat, Molds               5mL  6/2/2022  Red 1:1                       Cat, Molds               5mL  6/2/2022    Checked by Martin SPANGLER  Billed 24 units for cluster per Dr. Brian Jones, RN

## 2021-06-03 DIAGNOSIS — J30.1 SEASONAL ALLERGIC RHINITIS DUE TO POLLEN: ICD-10-CM

## 2021-06-03 DIAGNOSIS — J30.81 ALLERGIC RHINITIS DUE TO ANIMALS: Primary | ICD-10-CM

## 2021-06-03 DIAGNOSIS — J30.89 ALLERGIC RHINITIS DUE TO DUST MITE: ICD-10-CM

## 2021-06-03 DIAGNOSIS — J30.89 ALLERGIC RHINITIS DUE TO MOLD: ICD-10-CM

## 2021-06-03 PROCEDURE — 95165 ANTIGEN THERAPY SERVICES: CPT | Performed by: ALLERGY & IMMUNOLOGY

## 2021-06-03 NOTE — PROGRESS NOTES
Allergy serums billed to [facility].     Vials billed below:    Vial Color Content                      Vial Size Expiration Date  Green 1:1,000 Dog, Grass, Dust Mite, Weeds 5mL  9/3/2021  Blue 1:100 Dog, Grass, Dust Mite, Weeds 5mL  12/3/2021  Yellow 1:10 Dog, Grass, Dust Mite, Weeds 5mL  6/3/2022  Red 1:1 Dog, Grass, Dust Mite, Weeds 5mL  6/3/2022    Checked by Yecenia DIALLO RN  Bill 25 units per Dr Torres cluster therapy      Signature  Yecenia Lim RN

## 2021-06-07 ENCOUNTER — TELEPHONE (OUTPATIENT)
Dept: FAMILY MEDICINE | Facility: CLINIC | Age: 26
End: 2021-06-07

## 2021-06-07 ENCOUNTER — OFFICE VISIT (OUTPATIENT)
Dept: FAMILY MEDICINE | Facility: CLINIC | Age: 26
End: 2021-06-07
Payer: OTHER GOVERNMENT

## 2021-06-07 VITALS
DIASTOLIC BLOOD PRESSURE: 85 MMHG | WEIGHT: 199 LBS | OXYGEN SATURATION: 97 % | BODY MASS INDEX: 27.75 KG/M2 | HEART RATE: 65 BPM | TEMPERATURE: 98.3 F | SYSTOLIC BLOOD PRESSURE: 137 MMHG

## 2021-06-07 DIAGNOSIS — R07.0 THROAT DISCOMFORT: ICD-10-CM

## 2021-06-07 DIAGNOSIS — L71.9 ROSACEA: Primary | ICD-10-CM

## 2021-06-07 DIAGNOSIS — J34.89 NASAL SORE: Primary | ICD-10-CM

## 2021-06-07 PROCEDURE — 99214 OFFICE O/P EST MOD 30 MIN: CPT | Performed by: PHYSICIAN ASSISTANT

## 2021-06-07 RX ORDER — MUPIROCIN CALCIUM 20 MG/G
CREAM TOPICAL 3 TIMES DAILY
Qty: 30 G | Refills: 1 | Status: SHIPPED | OUTPATIENT
Start: 2021-06-07 | End: 2021-09-27

## 2021-06-07 RX ORDER — FAMOTIDINE 20 MG/1
20 TABLET, FILM COATED ORAL 2 TIMES DAILY
Qty: 60 TABLET | Refills: 1 | Status: SHIPPED | OUTPATIENT
Start: 2021-06-07 | End: 2021-09-27

## 2021-06-07 NOTE — TELEPHONE ENCOUNTER
Patient called back.  He had an office visit with Dana Gotti PA-C today and was instructed to call the clinic if he is not able to get in to see Dermatology sooner than June 23rd.    Patient has been seen by Dermatology 3/22/21 and is not able to get a follow-up appointment at the [place] until June 23rd.    Patient is open to a referral to alternative dermatologists in the [facility] area who may have available appointments sooner.

## 2021-06-07 NOTE — PATIENT INSTRUCTIONS
Call dermatology again for follow up on the nose   Use the cream on nose    Try Pepcid for the fullness feeling when needed

## 2021-06-07 NOTE — PROGRESS NOTES
Assessment & Plan     (J34.89) Nasal sore  (primary encounter diagnosis)  Comment: non healing-needs bx   Plan: mupirocin (BACTROBAN) 2 % external cream        Does look like impetigo so will treat with cream again but encouraged pt to see derm for possible bx since it is not healing.  Culture did not grow out MRSA    (R07.0) Throat discomfort  Comment: likely GERD  Plan: famotidine (PEPCID) 20 MG tablet        Follow up as needed                     Return in about 5 days (around 6/12/2021) for if not improving.    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  Has had for 2 years and did not have a biopsy.  Did have it cultured  Does wear gas mask for work and was using it when hot for several days when it first started.  Now not wearing that mask and not going away.  Antibiotics will clear it up No MRSA.    Food getting stuck in throat when he is eating decent sized meals  Tight food and harder to eat.  No pain but tight.  No treatment.  No nausea, or cough. Allergy symptoms.  No exercise intolerance.  No hx of thyroid problems.           Review of Systems   As above      Objective    /85 (BP Location: Right arm, Patient Position: Chair, Cuff Size: Adult Regular)   Pulse 65   Temp 98.3  F (36.8  C) (Oral)   Wt 90.3 kg (199 lb)   SpO2 97%   BMI 27.75 kg/m    Body mass index is 27.75 kg/m .  Physical Exam  Constitutional:       General: He is not in acute distress.  Neck:      Thyroid: No thyromegaly.   Cardiovascular:      Rate and Rhythm: Normal rate and regular rhythm.   Pulmonary:      Effort: Pulmonary effort is normal.      Breath sounds: Normal breath sounds.   Abdominal:       General: Bowel sounds are normal. There is no distension.      Palpations: There is no mass.      Tenderness: There is no abdominal tenderness.   Lymphadenopathy:      Cervical: No cervical adenopathy.   Skin:     Comments: Papular lesion on bridge of nose that has bloody and yellow drainage    Neurological:      Mental Status: He is alert.

## 2021-06-07 NOTE — TELEPHONE ENCOUNTER
Note that pt had appt with Dnaa Gotti today for other issues. Pt was referred to dermatology by Xiao EWING on 02/15 for Rosacea.     Called patient to ask if he has tried calling any other dermatology office locations to see if they have a sooner opening. Is there a specific dermatology office or location that he would like to try instead?

## 2021-06-07 NOTE — TELEPHONE ENCOUNTER
Reason for Call: Request for an order or referral:    Order or referral being requested: Dermatology referral,   [facility] soonest appointment is 6/23/21, hoping to find somewhere with sooner availability    Date needed: as soon as possible    Has the patient been seen by the PCP for this problem? YES    Additional comments: none     Phone number Patient can be reached at:  Home number on file [phone] (home)    Best Time:  Any     Can we leave a detailed message on this number?  YES    Call taken on 6/7/2021 at 11:54 AM by Micheline Fatima

## 2021-06-08 NOTE — TELEPHONE ENCOUNTER
Called patient gave him phone number to [facility] dermatology and faxed to them.Fax# [phone].  Reina Pritchard,

## 2021-06-08 NOTE — TELEPHONE ENCOUNTER
I would suggest he try [facility] in [place].  They usually have better access.  If still a long wait have him let me know.    Dana Gotti PA-C

## 2021-06-09 DIAGNOSIS — H10.13 ALLERGIC CONJUNCTIVITIS, BILATERAL: Primary | ICD-10-CM

## 2021-06-09 DIAGNOSIS — J30.1 SEASONAL ALLERGIC RHINITIS DUE TO POLLEN: ICD-10-CM

## 2021-06-09 PROCEDURE — 95165 ANTIGEN THERAPY SERVICES: CPT | Performed by: ALLERGY & IMMUNOLOGY

## 2021-06-09 NOTE — PROGRESS NOTES
Allergy serums billed to [facility].     Vials billed below:    Vial Color Content                      Vial Size Expiration Date  Green 1:1,000,    Trees                                              5mL 9/9/2021  Blue 1:100,     Trees                                              5mL 12/9/2021  Yellow 1:10     Trees                                              5mL 6/9/2022  Red 1:1                       Trees                                              5mL 6/9/2022    Checked by Martin SPANGLER  24 units billed per cluster protocol        Signature  Samaria Jones RN

## 2021-06-10 ENCOUNTER — TELEPHONE (OUTPATIENT)
Dept: ALLERGY | Facility: CLINIC | Age: 26
End: 2021-06-10

## 2021-06-10 DIAGNOSIS — J30.1 SEASONAL ALLERGIC RHINITIS DUE TO POLLEN: Primary | ICD-10-CM

## 2021-06-10 RX ORDER — EPINEPHRINE 0.3 MG/.3ML
0.3 INJECTION SUBCUTANEOUS PRN
Qty: 2 EACH | Refills: 2 | Status: SHIPPED | OUTPATIENT
Start: 2021-06-10 | End: 2022-07-28

## 2021-06-10 NOTE — TELEPHONE ENCOUNTER
Received patient's allergy serums.  Called patient to scheduled appointments.  Patient stated he would like to cluster.  Scheduled patient appointments.    Advised patient that he must be feeling well to receive allergy shots.  If patient is ill advised patient to reschedule allergy shot appointment.  Patient verbalized good understanding.  Advised patient no strenuous activity for at least 2 hours after he receives his allergy shots.  Advised patient that he needs to bring an epi pen to every shot appointment.  Patient requested Auvi-Q epi pen.  Advised patient that he will receive a call from an 800 # and that he must answer that call or they will not ship his epi pen. Patient verbalized good understanding.      Advised patient that he must premedicate by taking 2 antihistamines the morning and evening prior to his allergy shot appointment and at least one hour prior to receiving his shots.    My chart message with cluster instructions sent to patient.     Anaphylaxis action plan sent via my chart.    Lucia GRAY RN

## 2021-06-10 NOTE — PROGRESS NOTES
Allergy serums received at [facility].     Vials received below:    Vial Color Content                      Vial Size Expiration Date  Green 1:1,000, Blue 1:100, Yellow 1:10 and Red 1:1 Trees 5 mL  9/9/2021, 12/9/2021, 6/9/2022, 6/9/2022  Green 1:1,000, Blue 1:100, Yellow 1:10 and Red 1:1 Dog, Grass, Dust Mite, Weeds 5 mL  9/3/2021, 12/3/2021, 6/3/2022, 6/3/2022  Green 1:1,000, Blue 1:100, Yellow 1:10 and Red 1:1 Cat, Molds 5 mL  9/2/2021, 12/2/2021, 6/2/2022, 6/2/2022    Xiao SHAW MA

## 2021-07-06 ENCOUNTER — OFFICE VISIT (OUTPATIENT)
Dept: ALLERGY | Facility: CLINIC | Age: 26
End: 2021-07-06
Payer: OTHER GOVERNMENT

## 2021-07-06 VITALS — OXYGEN SATURATION: 97 % | SYSTOLIC BLOOD PRESSURE: 133 MMHG | DIASTOLIC BLOOD PRESSURE: 83 MMHG | HEART RATE: 60 BPM

## 2021-07-06 DIAGNOSIS — J30.81 ALLERGIC RHINITIS DUE TO ANIMALS: ICD-10-CM

## 2021-07-06 DIAGNOSIS — Z51.6 NEED FOR DESENSITIZATION TO ALLERGENS: ICD-10-CM

## 2021-07-06 DIAGNOSIS — H10.13 ALLERGIC CONJUNCTIVITIS, BILATERAL: ICD-10-CM

## 2021-07-06 DIAGNOSIS — J30.1 SEASONAL ALLERGIC RHINITIS DUE TO POLLEN: Primary | ICD-10-CM

## 2021-07-06 DIAGNOSIS — J30.89 ALLERGIC RHINITIS DUE TO DUST MITE: ICD-10-CM

## 2021-07-06 PROBLEM — J30.2 SEASONAL ALLERGIES: Status: RESOLVED | Noted: 2021-02-16 | Resolved: 2021-07-06

## 2021-07-06 PROCEDURE — 95180 RAPID DESENSITIZATION: CPT | Performed by: ALLERGY & IMMUNOLOGY

## 2021-07-06 PROCEDURE — 99207 PR DROP WITH A PROCEDURE: CPT | Performed by: ALLERGY & IMMUNOLOGY

## 2021-07-06 RX ORDER — EPINEPHRINE 0.3 MG/.3ML
0.3 INJECTION SUBCUTANEOUS PRN
Qty: 0.6 ML | Refills: 2 | Status: SHIPPED | OUTPATIENT
Start: 2021-07-06 | End: 2021-09-27

## 2021-07-06 RX ORDER — CETIRIZINE HYDROCHLORIDE 10 MG/1
10 TABLET ORAL ONCE
Status: COMPLETED | OUTPATIENT
Start: 2021-07-06 | End: 2021-07-06

## 2021-07-06 RX ADMIN — CETIRIZINE HYDROCHLORIDE 10 MG: 10 TABLET ORAL at 10:51

## 2021-07-06 NOTE — LETTER
7/6/2021         RE: Chago Soni  [address]  [facility] [address]        Dear Colleague,    Thank you for referring your patient, Chago Soni, to the [facility]. Please see a copy of my visit note below.    Chago Soni was seen in the Allergy Clinic at [facility].      Chago Soni is a 26 year old Choose not to answer male who is seen today for his initial cluster immunotherapy visit. He has been feeling well and has not had any recent fevers or illness.    History reviewed. No pertinent past medical history.  Family History   Problem Relation Age of Onset     Allergies Mother      Allergies Father      Social History     Tobacco Use     Smoking status: Never Smoker     Smokeless tobacco: Never Used   Substance Use Topics     Alcohol use: Yes     Drug use: Never     Social History     Social History Narrative     Not on file       Past medical, family, and social history were reviewed.    REVIEW OF SYSTEMS:  General: negative for weight gain. negative for weight loss. negative for changes in sleep.   Eyes: negative for itching. negative for redness. negative for tearing/watering. negative for vision changes  Ears: negative for fullness. negative for hearing loss. negative for dizziness.   Nose: negative for snoring.negative for changes in smell. negative for drainage.   Throat: negative for hoarseness. negative for sore throat. negative for trouble swallowing.   Lungs: negative for cough. negative for shortness of breath.negative for wheezing. negative for sputum production.   Cardiovascular: negative for chest pain. negative for swelling of ankles. negative for fast or irregular heartbeat.   Gastrointestinal: negative for nausea. negative for heartburn. negative for acid reflux.   Musculoskeletal: negative for joint pain. negative for joint stiffness. negative for joint swelling.   Neurologic: negative for seizures. negative for fainting.  negative for weakness.   Psychiatric: negative for changes in mood. negative for anxiety.   Endocrine: negative for cold intolerance. negative for heat intolerance. negative for tremors.   Hematologic: negative for easy bruising. negative for easy bleeding.  Integumentary: negative for rash. negative for scaling. negative for nail changes.       Current Outpatient Medications:      cetirizine (ZYRTEC) 10 MG tablet, Take 10 mg by mouth daily, Disp: , Rfl:      erythromycin (ROMYCIN) 5 MG/GM ophthalmic ointment, as needed, Disp: , Rfl:      famotidine (PEPCID) 20 MG tablet, Take 1 tablet (20 mg) by mouth 2 times daily, Disp: 60 tablet, Rfl: 1     fluticasone (FLONASE) 50 MCG/ACT nasal spray, Spray 1 spray into both nostrils daily, Disp: 16 g, Rfl: 1     metroNIDAZOLE (METROGEL) 0.75 % external gel, Apply topically 2 times daily, Disp: 45 g, Rfl: 1     mineral oil-hydrophilic petrolatum (AQUAPHOR) external ointment, Apply topically as needed, Disp: , Rfl:      mometasone (ELOCON) 0.1 % external cream, Apply topically daily To hands, Disp: 45 g, Rfl: 1     montelukast (SINGULAIR) 10 MG tablet, Take 1 tablet (10 mg) by mouth At Bedtime, Disp: 90 tablet, Rfl: 1     mupirocin (BACTROBAN) 2 % external cream, Apply topically 3 times daily To nose, Disp: 30 g, Rfl: 1     ORDER FOR ALLERGEN IMMUNOTHERAPY, Name of Mix: Mix #1  Mold, Cat Cat Hair, Standardized 10,000 BAU/mL, ALK  2.0 ml  Alternaria Tenuis 1:10 w/v, HS  0.5 ml Epicoccum Nigrum 1:10 w/v, HS 0.5 ml Phoma Herbarum 1:10 w/v, HS  0.5 ml Diluent: HSA qs to 5ml, Disp: 5 mL, Rfl: PRN     ORDER FOR ALLERGEN IMMUNOTHERAPY, Name of Mix: Mix #2  Dust Mite, Dog, Grass, Weeds Dog Hair-Dander, A. P.  1:100 w/v, HS  1.0 ml Dust Mites DP. 10,000 AU/mL, HS  1.0 ml  Speedy Grass (Std) 100,000 BAU/mL, HS 0.4 ml Pigweed, Careless 1:20 w/v, HS 0.5 ml Ragweed Mixed 1:20 w/v ALK  0.5 ml Sorrel, Sheep 1:20 w/v, HS 0.5 ml Diluent: HSA qs to 5ml, Disp: 5 mL, Rfl: PRN     ORDER FOR  ALLERGEN IMMUNOTHERAPY, Name of Mix: Mix #3  Tree  Birch Mix PRW 1:20 w/v, HS  0.5 ml Boxelder-Maple Mix BHR (Boxelder Hard Red) 1:20 w/v, HS  0.5 ml Elm, American 1:20 w/v, HS  0.5 ml Hackberry 1:20 w/v, HS 0.5 ml Hickory, Shagbark 1:20 w/v, HS  0.5 ml [place] Mix RW 1:20 w/v, HS 0.5 ml Oak Mix RVW 1:20 w/v, HS 0.5 ml [place] Tree, Black 1:20 w/v, HS 0.5 ml [place], Black 1:20 w/v, HS 0.5 ml Diluent: HSA qs to 5ml, Disp: 5 mL, Rfl: PRN     tacrolimus (PROTOPIC) 0.1 % external ointment, Apply once daily to rash on the nose and eyelids, Disp: 30 g, Rfl: 11     EPINEPHrine (ANY BX GENERIC EQUIV) 0.3 MG/0.3ML injection 2-pack, Inject 0.3 mLs (0.3 mg) into the muscle as needed for anaphylaxis (Patient not taking: Reported on 7/6/2021), Disp: 0.6 mL, Rfl: 2     EPINEPHrine (AUVI-Q) 0.3 MG/0.3ML injection 2-pack, Inject 0.3 mLs (0.3 mg) into the muscle as needed for anaphylaxis (Patient not taking: Reported on 7/6/2021), Disp: 2 each, Rfl: 2  Allergies   Allergen Reactions     Doxycycline      Eye swelling      Eggs [Chicken-Derived Products (Egg)]        EXAM:   /82 (BP Location: Right arm, Patient Position: Sitting, Cuff Size: Adult Regular)   Pulse 69   SpO2 96%   GENERAL APPEARANCE: alert, cooperative and not in distress  SKIN: no rashes, no lesions  HEAD: atraumatic, normocephalic  EYES: lids and lashes normal, conjunctivae and sclerae clear, EOM full and intact  ENT: no scars or lesions, nasal exam showed no discharge, swelling or lesions noted, tongue midline and normal, soft palate, uvula, and tonsils normal  NECK: no asymmetry, masses, or scars, supple without significant adenopathy  LUNGS: unlabored respirations, no intercostal retractions or accessory muscle use, clear to auscultation without rales or wheezes  HEART: regular rate and rhythm without murmurs and normal S1 and S2  MUSCULOSKELETAL: no musculoskeletal defects are noted  NEURO: no focal deficits noted  PSYCH: does not appear depressed or  anxious      WORKUP:  Cluster Immunotherapy    Cluster Allergen Immunotherapy:    After explaining risks and benefits, and obtaining verbal and written consent, we proceeded with cluster immunotherapy.     VISIT  VIAL COLOR/STRENGTH  DOSES TO BE GIVEN    1  GREEN (1:1000), BLUE (1:100)  GREEN 0.1, GREEN 0.2, GREEN 0.4, BLUE 0.1    2  BLUE (1:100), YELLOW (1:10)  BLUE 0.2, BLUE 0.4, YELLOW 0.05    3  YELLOW (1:10)  YELLOW 0.1, YELLOW 0.15, YELLOW 0.25    4  YELLOW (1:10)  YELLOW 0.35, YELLOW 0.5    5  RED (1:1)  RED 0.05, RED 0.1    6  RED (1:1)  RED 0.15, RED 0.2    7  RED (1:1)  RED 0.3, RED 0.4    8  RED (1:1)  RED 0.5        VISIT 1    Time Injection Given: 8:30  Green 1:1,000   Cat, Molds     0.1 mL  Green 1:1,000   Dog, Grass, Dust Mite, Weeds  0.1 mL  Green 1:1,000   Trees      0.1 mL        Time Injection Given: 9:03  Green 1:1,000   Cat, Molds     0.2 mL  Green 1:1,000   Dog, Grass, Dust Mite, Weeds  0.2 mL  Green 1:1,000   Trees      0.2 mL    Time Injection Given: 9:35  Green 1:1,000   Cat, Molds     0.4 mL  Green 1:1,000   Dog, Grass, Dust Mite, Weeds  0.4 mL  Green 1:1,000   Trees      0.4 mL    Time Injection Given: 10:14  Blue 1:100   Cat, Molds     0.1 mL  Blue 1:100   Dog, Grass, Dust Mite, Weeds  0.1 mL  Blue 1:100   Trees      0.1 mL      Start Time: 8:30  End Time: 10:44        VITALS   Time BP Pulse pOx Reaction Treatment   9:00 134/83 54 98% none n/a   9:33 133/83 60 97% none n/a   10:10 128/84 50 97% none n/a   10:44 124/78 51 97% none n/a       ASSESSMENT/PLAN:  Chago Soni is a 26 year old male here for cluster immunotherapy.    1. Seasonal allergic rhinitis due to pollen - Ross tolerated today's procedure well without developing any signs or symptoms of an adverse reaction.    - return in 7-14 days to continue cluster protocol  - continue pre-medication with antihistamines as directed  - RAPID DESENSITIZATION  - cetirizine (zyrTEC) tablet 10 mg    2. Allergic rhinitis due to animals - see  above    - RAPID DESENSITIZATION  - cetirizine (zyrTEC) tablet 10 mg    3. Allergic rhinitis due to dust mite - see above    - RAPID DESENSITIZATION  - cetirizine (zyrTEC) tablet 10 mg    4. Allergic conjunctivitis, bilateral - see above    - RAPID DESENSITIZATION  - cetirizine (zyrTEC) tablet 10 mg    5. Need for desensitization to allergens    - EPINEPHrine (ANY BX GENERIC EQUIV) 0.3 MG/0.3ML injection 2-pack; Inject 0.3 mLs (0.3 mg) into the muscle as needed for anaphylaxis (Patient not taking: Reported on 7/6/2021)  Dispense: 0.6 mL; Refill: 2      Thank you for allowing me to participate in the care of Chago Soni.      Shavonne Torres MD, [facility]  Allergy/Immunology  [facility]      Chart documentation done in part with Dragon Voice Recognition Software. Although reviewed after completion, some word and grammatical errors may remain.      Again, thank you for allowing me to participate in the care of your patient.        Sincerely,        Shavonne Torres MD

## 2021-07-06 NOTE — PATIENT INSTRUCTIONS
If you have any questions regarding your allergies, asthma, or what we discussed during your visit today please call the allergy clinic or contact us via [employer].    [facility]: [phone] - call this number with any questions during or after business/clinic hours  [employer] [place] Scheduling Line: [phone]  [facility] Scheduling Line: [phone] - this number is ONLY for scheduling and should not be used to get in touch with the allergy team    Clinic Schedule:   [place] - Monday, Tuesday, and Thursday  [facility] - Wednesday      ACCELERATED IMMUNOTHERAPY PATIENT INFORMATION    Immunotherapy is a treatment that alters the patient's immune system so they have less allergy symptoms, use less medications to control symptoms, have improved quality of life, and less health care utilization    Accelerated immunotherapy schedules are designed to allow patients to reach their maintenance immunotherapy dose in a shorter time frame than conventional immunotherapy.    Clinical benefit can be reached more rapidly using accelerated immunotherapy.     Many patients do not want to start allergen immunotherapy secondary to the upfront time commitment associated with conventional allergy shots. Cluster immunotherapy would allow the patient to be on monthly injections in a much shorter period of time. The maintenance dose is typically reached within 8 to 9 weeks.     Cluster immunotherapy has a similar risk of systemic reaction to conventional immunotherapy. The patient will need to take oral antihistamines to pre-medicate prior to injection appointments while going through this treatment.    CLUSTER IMMUNOTHERAPY PATIENT INSTRUCTIONS    Asthma medications must be continued and asthma must be well controlled prior to receiving CLUSTER immunotherapy. Immunotherapy should not be given if you are feeling ill.    Other allergy medications  may be continued    You should plan to spend approximately 2 hours at the clinic. Please feel free to bring things to occupy your time such as books, work, or computers. You may also wish to bring something to eat as you will not be able to leave the clinic once the procedure has begun.    You will be required to bring an epinephrine auto-injector with you to your CLUSTER immunotherapy appointments and all subsequent allergy shot appointments    You will be required to take the following pre-medication regimen prior  to your CLUSTER immunotherapy appointments  o Medications to be taken the day prior to CLUSTER appointment:  - Zyrtec (cetirizine) 20mg twice daily, Xyzal (levocetirizine) 10mg twice daily, or Allegra (fexofenadine) 360mg twice daily  o Medications to be taken the morning of CLUSTER appointment or at least 1 hour prior to the visit:  - Zyrtec (cetirizine) 20mg, Xyzal (levocetirizine) 10mg, or Allegra (fexofenadine) 360mg    Anaphylaxis Action Plan for Immunotherapy Patients    There is risk of systemic reactions when receiving immunotherapy injections. Local reactions such as a wheal (hive) smaller than a quarter, redness, swelling, and soreness are common. If the wheal is greater than the size of a half dollar (3.4 cm) please contact our clinic (numbers below), as we will need to adjust the dose that you receive at your next injection. Waiting until the next appointment to inform us of the reaction could increase the wait time that you experience, because your allergist will need to be contacted for new orders prior to giving the injection.  If you have symptoms not localized to the injection site, please follow the anaphylaxis plan, and contact our office to update after you have received emergency medical treatment. Please ask to speak to an Allergy RN with any questions or updates.     [facility]: [phone]  [facility]: [phone]  [facility]: [phone]  [place]:  [phone]  [phone]

## 2021-07-06 NOTE — NURSING NOTE
Patient premedicated with Wal-Dryl 1 tablet yesterday and 1 tablet today. Gave patient 10 mg cetirizine prior to beginning cluster immunotherapy. Provided patient with cluster immunotherapy instructions in AVS.     Prior to initiation of cluster immunotherapy RN ensured that patient was feeling healthy, has premedicated with Zyrtec or Allegra yesterday twice daily and this morning. RN also ensured that patient has unexpired Epi-Pen, had no new medication changes, and did not have a reaction after the last allergy shots were given. If the patient has asthma it is well-controlled. The patient has not been ill in the past 7 days. Patient was given allergy injections per cluster immunotherapy protocol 30 minutes apart and vital signs were monitored. Patient was monitored in clinic for 30 minutes after last injection was given and assessed by provider before discharging.     Carly Cornell RN

## 2021-07-06 NOTE — PROGRESS NOTES
Chago Soni was seen in the Allergy Clinic at [facility].      Chago Soni is a 26 year old Choose not to answer male who is seen today for his initial cluster immunotherapy visit. He has been feeling well and has not had any recent fevers or illness.    History reviewed. No pertinent past medical history.  Family History   Problem Relation Age of Onset     Allergies Mother      Allergies Father      Social History     Tobacco Use     Smoking status: Never Smoker     Smokeless tobacco: Never Used   Substance Use Topics     Alcohol use: Yes     Drug use: Never     Social History     Social History Narrative     Not on file       Past medical, family, and social history were reviewed.    REVIEW OF SYSTEMS:  General: negative for weight gain. negative for weight loss. negative for changes in sleep.   Eyes: negative for itching. negative for redness. negative for tearing/watering. negative for vision changes  Ears: negative for fullness. negative for hearing loss. negative for dizziness.   Nose: negative for snoring.negative for changes in smell. negative for drainage.   Throat: negative for hoarseness. negative for sore throat. negative for trouble swallowing.   Lungs: negative for cough. negative for shortness of breath.negative for wheezing. negative for sputum production.   Cardiovascular: negative for chest pain. negative for swelling of ankles. negative for fast or irregular heartbeat.   Gastrointestinal: negative for nausea. negative for heartburn. negative for acid reflux.   Musculoskeletal: negative for joint pain. negative for joint stiffness. negative for joint swelling.   Neurologic: negative for seizures. negative for fainting. negative for weakness.   Psychiatric: negative for changes in mood. negative for anxiety.   Endocrine: negative for cold intolerance. negative for heat intolerance. negative for tremors.   Hematologic: negative for easy bruising. negative for easy  bleeding.  Integumentary: negative for rash. negative for scaling. negative for nail changes.       Current Outpatient Medications:      cetirizine (ZYRTEC) 10 MG tablet, Take 10 mg by mouth daily, Disp: , Rfl:      erythromycin (ROMYCIN) 5 MG/GM ophthalmic ointment, as needed, Disp: , Rfl:      famotidine (PEPCID) 20 MG tablet, Take 1 tablet (20 mg) by mouth 2 times daily, Disp: 60 tablet, Rfl: 1     fluticasone (FLONASE) 50 MCG/ACT nasal spray, Spray 1 spray into both nostrils daily, Disp: 16 g, Rfl: 1     metroNIDAZOLE (METROGEL) 0.75 % external gel, Apply topically 2 times daily, Disp: 45 g, Rfl: 1     mineral oil-hydrophilic petrolatum (AQUAPHOR) external ointment, Apply topically as needed, Disp: , Rfl:      mometasone (ELOCON) 0.1 % external cream, Apply topically daily To hands, Disp: 45 g, Rfl: 1     montelukast (SINGULAIR) 10 MG tablet, Take 1 tablet (10 mg) by mouth At Bedtime, Disp: 90 tablet, Rfl: 1     mupirocin (BACTROBAN) 2 % external cream, Apply topically 3 times daily To nose, Disp: 30 g, Rfl: 1     ORDER FOR ALLERGEN IMMUNOTHERAPY, Name of Mix: Mix #1  Mold, Cat Cat Hair, Standardized 10,000 BAU/mL, ALK  2.0 ml  Alternaria Tenuis 1:10 w/v, HS  0.5 ml Epicoccum Nigrum 1:10 w/v, HS 0.5 ml Phoma Herbarum 1:10 w/v, HS  0.5 ml Diluent: HSA qs to 5ml, Disp: 5 mL, Rfl: PRN     ORDER FOR ALLERGEN IMMUNOTHERAPY, Name of Mix: Mix #2  Dust Mite, Dog, Grass, Weeds Dog Hair-Dander, A. P.  1:100 w/v, HS  1.0 ml Dust Mites DP. 10,000 AU/mL, HS  1.0 ml  Speedy Grass (Std) 100,000 BAU/mL, HS 0.4 ml Pigweed, Careless 1:20 w/v, HS 0.5 ml Ragweed Mixed 1:20 w/v ALK  0.5 ml Sorrel, Sheep 1:20 w/v, HS 0.5 ml Diluent: HSA qs to 5ml, Disp: 5 mL, Rfl: PRN     ORDER FOR ALLERGEN IMMUNOTHERAPY, Name of Mix: Mix #3  Tree  Birch Mix PRW 1:20 w/v, HS  0.5 ml Boxelder-Maple Mix BHR (Boxelder Hard Red) 1:20 w/v, HS  0.5 ml Elm, American 1:20 w/v, HS  0.5 ml Hackberry 1:20 w/v, HS 0.5 ml Hickory, Shagbark 1:20 w/v, HS  0.5 ml  [place] Mix RW 1:20 w/v, HS 0.5 ml Oak Mix RVW 1:20 w/v, HS 0.5 ml [place] Tree, Black 1:20 w/v, HS 0.5 ml [place], Black 1:20 w/v, HS 0.5 ml Diluent: HSA qs to 5ml, Disp: 5 mL, Rfl: PRN     tacrolimus (PROTOPIC) 0.1 % external ointment, Apply once daily to rash on the nose and eyelids, Disp: 30 g, Rfl: 11     EPINEPHrine (ANY BX GENERIC EQUIV) 0.3 MG/0.3ML injection 2-pack, Inject 0.3 mLs (0.3 mg) into the muscle as needed for anaphylaxis (Patient not taking: Reported on 7/6/2021), Disp: 0.6 mL, Rfl: 2     EPINEPHrine (AUVI-Q) 0.3 MG/0.3ML injection 2-pack, Inject 0.3 mLs (0.3 mg) into the muscle as needed for anaphylaxis (Patient not taking: Reported on 7/6/2021), Disp: 2 each, Rfl: 2  Allergies   Allergen Reactions     Doxycycline      Eye swelling      Eggs [Chicken-Derived Products (Egg)]        EXAM:   /82 (BP Location: Right arm, Patient Position: Sitting, Cuff Size: Adult Regular)   Pulse 69   SpO2 96%   GENERAL APPEARANCE: alert, cooperative and not in distress  SKIN: no rashes, no lesions  HEAD: atraumatic, normocephalic  EYES: lids and lashes normal, conjunctivae and sclerae clear, EOM full and intact  ENT: no scars or lesions, nasal exam showed no discharge, swelling or lesions noted, tongue midline and normal, soft palate, uvula, and tonsils normal  NECK: no asymmetry, masses, or scars, supple without significant adenopathy  LUNGS: unlabored respirations, no intercostal retractions or accessory muscle use, clear to auscultation without rales or wheezes  HEART: regular rate and rhythm without murmurs and normal S1 and S2  MUSCULOSKELETAL: no musculoskeletal defects are noted  NEURO: no focal deficits noted  PSYCH: does not appear depressed or anxious      WORKUP:  Cluster Immunotherapy    Cluster Allergen Immunotherapy:    After explaining risks and benefits, and obtaining verbal and written consent, we proceeded with cluster immunotherapy.     VISIT  VIAL COLOR/STRENGTH  DOSES TO BE GIVEN    1   GREEN (1:1000), BLUE (1:100)  GREEN 0.1, GREEN 0.2, GREEN 0.4, BLUE 0.1    2  BLUE (1:100), YELLOW (1:10)  BLUE 0.2, BLUE 0.4, YELLOW 0.05    3  YELLOW (1:10)  YELLOW 0.1, YELLOW 0.15, YELLOW 0.25    4  YELLOW (1:10)  YELLOW 0.35, YELLOW 0.5    5  RED (1:1)  RED 0.05, RED 0.1    6  RED (1:1)  RED 0.15, RED 0.2    7  RED (1:1)  RED 0.3, RED 0.4    8  RED (1:1)  RED 0.5        VISIT 1    Time Injection Given: 8:30  Green 1:1,000   Cat, Molds     0.1 mL  Green 1:1,000   Dog, Grass, Dust Mite, Weeds  0.1 mL  Green 1:1,000   Trees      0.1 mL        Time Injection Given: 9:03  Green 1:1,000   Cat, Molds     0.2 mL  Green 1:1,000   Dog, Grass, Dust Mite, Weeds  0.2 mL  Green 1:1,000   Trees      0.2 mL    Time Injection Given: 9:35  Green 1:1,000   Cat, Molds     0.4 mL  Green 1:1,000   Dog, Grass, Dust Mite, Weeds  0.4 mL  Green 1:1,000   Trees      0.4 mL    Time Injection Given: 10:14  Blue 1:100   Cat, Molds     0.1 mL  Blue 1:100   Dog, Grass, Dust Mite, Weeds  0.1 mL  Blue 1:100   Trees      0.1 mL      Start Time: 8:30  End Time: 10:44        VITALS   Time BP Pulse pOx Reaction Treatment   9:00 134/83 54 98% none n/a   9:33 133/83 60 97% none n/a   10:10 128/84 50 97% none n/a   10:44 124/78 51 97% none n/a       ASSESSMENT/PLAN:  Chago Soni is a 26 year old male here for cluster immunotherapy.    1. Seasonal allergic rhinitis due to pollen - Ross tolerated today's procedure well without developing any signs or symptoms of an adverse reaction.    - return in 7-14 days to continue cluster protocol  - continue pre-medication with antihistamines as directed  - RAPID DESENSITIZATION  - cetirizine (zyrTEC) tablet 10 mg    2. Allergic rhinitis due to animals - see above    - RAPID DESENSITIZATION  - cetirizine (zyrTEC) tablet 10 mg    3. Allergic rhinitis due to dust mite - see above    - RAPID DESENSITIZATION  - cetirizine (zyrTEC) tablet 10 mg    4. Allergic conjunctivitis, bilateral - see above    - RAPID  DESENSITIZATION  - cetirizine (zyrTEC) tablet 10 mg    5. Need for desensitization to allergens    - EPINEPHrine (ANY BX GENERIC EQUIV) 0.3 MG/0.3ML injection 2-pack; Inject 0.3 mLs (0.3 mg) into the muscle as needed for anaphylaxis (Patient not taking: Reported on 7/6/2021)  Dispense: 0.6 mL; Refill: 2      Thank you for allowing me to participate in the care of Chago Soni.      Shavonne Torres MD, FAAAAI  Allergy/Immunology  [facility]      Chart documentation done in part with Dragon Voice Recognition Software. Although reviewed after completion, some word and grammatical errors may remain.

## 2021-07-13 ENCOUNTER — OFFICE VISIT (OUTPATIENT)
Dept: ALLERGY | Facility: CLINIC | Age: 26
End: 2021-07-13
Payer: OTHER GOVERNMENT

## 2021-07-13 VITALS — SYSTOLIC BLOOD PRESSURE: 115 MMHG | DIASTOLIC BLOOD PRESSURE: 67 MMHG | OXYGEN SATURATION: 98 % | HEART RATE: 60 BPM

## 2021-07-13 DIAGNOSIS — J30.81 ALLERGIC RHINITIS DUE TO ANIMALS: ICD-10-CM

## 2021-07-13 DIAGNOSIS — J30.89 ALLERGIC RHINITIS DUE TO DUST MITE: ICD-10-CM

## 2021-07-13 DIAGNOSIS — H10.13 ALLERGIC CONJUNCTIVITIS, BILATERAL: ICD-10-CM

## 2021-07-13 DIAGNOSIS — J30.1 SEASONAL ALLERGIC RHINITIS DUE TO POLLEN: Primary | ICD-10-CM

## 2021-07-13 PROCEDURE — 95180 RAPID DESENSITIZATION: CPT | Performed by: ALLERGY & IMMUNOLOGY

## 2021-07-13 PROCEDURE — 99207 PR DROP WITH A PROCEDURE: CPT | Performed by: ALLERGY & IMMUNOLOGY

## 2021-07-13 NOTE — PROGRESS NOTES
Chago Soni was seen in the Allergy Clinic at [facility].      Chago Soni is a 26 year old Choose not to answer male who is seen today for cluster immunotherapy. He had no adverse reactions after his last visit. He has been feeling well with no recent symptoms of fever or illness. Ross pre-medicated with cetirizine as directed prior to today's visit.    History reviewed. No pertinent past medical history.  Family History   Problem Relation Age of Onset     Allergies Mother      Allergies Father      Social History     Tobacco Use     Smoking status: Never Smoker     Smokeless tobacco: Never Used   Substance Use Topics     Alcohol use: Yes     Drug use: Never     Social History     Social History Narrative     Not on file       Past medical, family, and social history were reviewed.    REVIEW OF SYSTEMS:  General: negative for weight gain. negative for weight loss. negative for changes in sleep.   Eyes: negative for itching. negative for redness. negative for tearing/watering. negative for vision changes  Ears: negative for fullness. negative for hearing loss. negative for dizziness.   Nose: negative for snoring.negative for changes in smell. negative for drainage.   Throat: negative for hoarseness. negative for sore throat. negative for trouble swallowing.   Lungs: negative for cough. negative for shortness of breath.negative for wheezing. negative for sputum production.   Cardiovascular: negative for chest pain. negative for swelling of ankles. negative for fast or irregular heartbeat.   Gastrointestinal: negative for nausea. negative for heartburn. negative for acid reflux.   Musculoskeletal: negative for joint pain. negative for joint stiffness. negative for joint swelling.   Neurologic: negative for seizures. negative for fainting. negative for weakness.   Psychiatric: negative for changes in mood. negative for anxiety.   Endocrine: negative for cold intolerance. negative for heat  intolerance. negative for tremors.   Hematologic: negative for easy bruising. negative for easy bleeding.  Integumentary: negative for rash. negative for scaling. negative for nail changes.       Current Outpatient Medications:      cetirizine (ZYRTEC) 10 MG tablet, Take 10 mg by mouth daily, Disp: , Rfl:      erythromycin (ROMYCIN) 5 MG/GM ophthalmic ointment, as needed, Disp: , Rfl:      famotidine (PEPCID) 20 MG tablet, Take 1 tablet (20 mg) by mouth 2 times daily, Disp: 60 tablet, Rfl: 1     fluticasone (FLONASE) 50 MCG/ACT nasal spray, Spray 1 spray into both nostrils daily, Disp: 16 g, Rfl: 1     metroNIDAZOLE (METROGEL) 0.75 % external gel, Apply topically 2 times daily, Disp: 45 g, Rfl: 1     mineral oil-hydrophilic petrolatum (AQUAPHOR) external ointment, Apply topically as needed, Disp: , Rfl:      mometasone (ELOCON) 0.1 % external cream, Apply topically daily To hands, Disp: 45 g, Rfl: 1     montelukast (SINGULAIR) 10 MG tablet, Take 1 tablet (10 mg) by mouth At Bedtime, Disp: 90 tablet, Rfl: 1     mupirocin (BACTROBAN) 2 % external cream, Apply topically 3 times daily To nose, Disp: 30 g, Rfl: 1     ORDER FOR ALLERGEN IMMUNOTHERAPY, Name of Mix: Mix #1  Mold, Cat Cat Hair, Standardized 10,000 BAU/mL, ALK  2.0 ml  Alternaria Tenuis 1:10 w/v, HS  0.5 ml Epicoccum Nigrum 1:10 w/v, HS 0.5 ml Phoma Herbarum 1:10 w/v, HS  0.5 ml Diluent: HSA qs to 5ml, Disp: 5 mL, Rfl: PRN     ORDER FOR ALLERGEN IMMUNOTHERAPY, Name of Mix: Mix #2  Dust Mite, Dog, Grass, Weeds Dog Hair-Dander, A. P.  1:100 w/v, HS  1.0 ml Dust Mites DP. 10,000 AU/mL, HS  1.0 ml  Speedy Grass (Std) 100,000 BAU/mL, HS 0.4 ml Pigweed, Careless 1:20 w/v, HS 0.5 ml Ragweed Mixed 1:20 w/v ALK  0.5 ml Sorrel, Sheep 1:20 w/v, HS 0.5 ml Diluent: HSA qs to 5ml, Disp: 5 mL, Rfl: PRN     ORDER FOR ALLERGEN IMMUNOTHERAPY, Name of Mix: Mix #3  Tree  Birch Mix PRW 1:20 w/v, HS  0.5 ml Boxelder-Maple Mix BHR (Boxelder Hard Red) 1:20 w/v, HS  0.5 ml Elm,  American 1:20 w/v, HS  0.5 ml Hackberry 1:20 w/v, HS 0.5 ml Hickory, Shagbark 1:20 w/v, HS  0.5 ml [place] Mix RW 1:20 w/v, HS 0.5 ml Oak Mix RVW 1:20 w/v, HS 0.5 ml [place] Tree, Black 1:20 w/v, HS 0.5 ml [place], Black 1:20 w/v, HS 0.5 ml Diluent: HSA qs to 5ml, Disp: 5 mL, Rfl: PRN     tacrolimus (PROTOPIC) 0.1 % external ointment, Apply once daily to rash on the nose and eyelids, Disp: 30 g, Rfl: 11     EPINEPHrine (ANY BX GENERIC EQUIV) 0.3 MG/0.3ML injection 2-pack, Inject 0.3 mLs (0.3 mg) into the muscle as needed for anaphylaxis (Patient not taking: Reported on 7/13/2021), Disp: 0.6 mL, Rfl: 2     EPINEPHrine (AUVI-Q) 0.3 MG/0.3ML injection 2-pack, Inject 0.3 mLs (0.3 mg) into the muscle as needed for anaphylaxis (Patient not taking: Reported on 7/13/2021), Disp: 2 each, Rfl: 2  Allergies   Allergen Reactions     Doxycycline      Eye swelling      Eggs [Chicken-Derived Products (Egg)]        EXAM:   /67 (BP Location: Right arm, Patient Position: Sitting, Cuff Size: Adult Regular)   Pulse 60   SpO2 98%   GENERAL APPEARANCE: alert, cooperative and not in distress  SKIN: no rashes, no lesions  HEAD: atraumatic, normocephalic  EYES: lids and lashes normal, conjunctivae and sclerae clear, EOM full and intact  ENT: no scars or lesions, nasal exam showed no discharge, swelling or lesions noted, tongue midline and normal, soft palate, uvula, and tonsils normal  NECK: no asymmetry, masses, or scars, supple without significant adenopathy  LUNGS: unlabored respirations, no intercostal retractions or accessory muscle use, clear to auscultation without rales or wheezes  HEART: regular rate and rhythm without murmurs and normal S1 and S2  MUSCULOSKELETAL: no musculoskeletal defects are noted  NEURO: no focal deficits noted  PSYCH: does not appear depressed or anxious      WORKUP:  Cluster Immunotherapy    Cluster Allergen Immunotherapy:    After explaining risks and benefits, and obtaining verbal and written  consent, we proceeded with cluster immunotherapy.     VISIT  VIAL COLOR/STRENGTH  DOSES TO BE GIVEN    1  GREEN (1:1000), BLUE (1:100)  GREEN 0.1, GREEN 0.2, GREEN 0.4, BLUE 0.1    2  BLUE (1:100), YELLOW (1:10)  BLUE 0.2, BLUE 0.4, YELLOW 0.05    3  YELLOW (1:10)  YELLOW 0.1, YELLOW 0.15, YELLOW 0.25    4  YELLOW (1:10)  YELLOW 0.35, YELLOW 0.5    5  RED (1:1)  RED 0.05, RED 0.1    6  RED (1:1)  RED 0.15, RED 0.2    7  RED (1:1)  RED 0.3, RED 0.4    8  RED (1:1)  RED 0.5        VISIT 2    After explaining risks and benefits, and obtaining verbal and written consent, we proceeded with cluster immunotherapy.    Time Injection Given: 9:14  Blue 1:100   Dog, Grass, Dust Mite, Weeds   0.2 mL  Blue 1:100   Trees       0.2 mL  Blue 1:100   Cat, Molds      0.2 mL    Time Injection Given: 9:48  Blue 1:100   Dog, Grass, Dust Mite, Weeds   0.4 mL  Blue 1:100   Trees       0.4 mL  Blue 1:100   Cat, Molds      0.4 mL      Time Injection Given: 10:22  Yellow 1:10   Dog, Grass, Dust Mite, Weeds   0.05 mL  Yellow 1:10   Trees       0.05 mL  Yellow 1:10   Cat, Molds      0.05 mL        Start Time: 9:14  End Time: 10:52        VITALS   Time BP Pulse pOx Reaction Treatment   9:45 119/71 61 97% none n/a   10:19 125/75 65 96% none n/a   10:52 120/74 52 98% none n/a       ASSESSMENT/PLAN:  Chago Soni is a 26 year old male here for cluster immunotherapy.    1. Seasonal allergic rhinitis due to pollen - Ross tolerated today's procedure well without developing any signs or symptoms of an adverse reaction.     - return in 7-14 days to continue cluster protocol  - continue pre-medication with antihistamines as directed  - RAPID DESENSITIZATION    2. Allergic rhinitis due to animals - see above    - RAPID DESENSITIZATION    3. Allergic rhinitis due to dust mite - see above    - RAPID DESENSITIZATION    4. Allergic conjunctivitis, bilateral - see above    - RAPID DESENSITIZATION      Thank you for allowing me to participate in the care of  Chago Soni.      Shavonne Torres MD, FAAAAI  Allergy/Immunology  [facility]      Chart documentation done in part with Dragon Voice Recognition Software. Although reviewed after completion, some word and grammatical errors may remain.

## 2021-07-13 NOTE — LETTER
7/13/2021         RE: Chago Soni  [address]  [facility] [address]        Dear Colleague,    Thank you for referring your patient, Chago Soni, to the [facility]. Please see a copy of my visit note below.    Chago Soni was seen in the Allergy Clinic at [facility].      Chago Soni is a 26 year old Choose not to answer male who is seen today for cluster immunotherapy. He had no adverse reactions after his last visit. He has been feeling well with no recent symptoms of fever or illness. Ross pre-medicated with cetirizine as directed prior to today's visit.    History reviewed. No pertinent past medical history.  Family History   Problem Relation Age of Onset     Allergies Mother      Allergies Father      Social History     Tobacco Use     Smoking status: Never Smoker     Smokeless tobacco: Never Used   Substance Use Topics     Alcohol use: Yes     Drug use: Never     Social History     Social History Narrative     Not on file       Past medical, family, and social history were reviewed.    REVIEW OF SYSTEMS:  General: negative for weight gain. negative for weight loss. negative for changes in sleep.   Eyes: negative for itching. negative for redness. negative for tearing/watering. negative for vision changes  Ears: negative for fullness. negative for hearing loss. negative for dizziness.   Nose: negative for snoring.negative for changes in smell. negative for drainage.   Throat: negative for hoarseness. negative for sore throat. negative for trouble swallowing.   Lungs: negative for cough. negative for shortness of breath.negative for wheezing. negative for sputum production.   Cardiovascular: negative for chest pain. negative for swelling of ankles. negative for fast or irregular heartbeat.   Gastrointestinal: negative for nausea. negative for heartburn. negative for acid reflux.   Musculoskeletal: negative for joint pain. negative for joint  stiffness. negative for joint swelling.   Neurologic: negative for seizures. negative for fainting. negative for weakness.   Psychiatric: negative for changes in mood. negative for anxiety.   Endocrine: negative for cold intolerance. negative for heat intolerance. negative for tremors.   Hematologic: negative for easy bruising. negative for easy bleeding.  Integumentary: negative for rash. negative for scaling. negative for nail changes.       Current Outpatient Medications:      cetirizine (ZYRTEC) 10 MG tablet, Take 10 mg by mouth daily, Disp: , Rfl:      erythromycin (ROMYCIN) 5 MG/GM ophthalmic ointment, as needed, Disp: , Rfl:      famotidine (PEPCID) 20 MG tablet, Take 1 tablet (20 mg) by mouth 2 times daily, Disp: 60 tablet, Rfl: 1     fluticasone (FLONASE) 50 MCG/ACT nasal spray, Spray 1 spray into both nostrils daily, Disp: 16 g, Rfl: 1     metroNIDAZOLE (METROGEL) 0.75 % external gel, Apply topically 2 times daily, Disp: 45 g, Rfl: 1     mineral oil-hydrophilic petrolatum (AQUAPHOR) external ointment, Apply topically as needed, Disp: , Rfl:      mometasone (ELOCON) 0.1 % external cream, Apply topically daily To hands, Disp: 45 g, Rfl: 1     montelukast (SINGULAIR) 10 MG tablet, Take 1 tablet (10 mg) by mouth At Bedtime, Disp: 90 tablet, Rfl: 1     mupirocin (BACTROBAN) 2 % external cream, Apply topically 3 times daily To nose, Disp: 30 g, Rfl: 1     ORDER FOR ALLERGEN IMMUNOTHERAPY, Name of Mix: Mix #1  Mold, Cat Cat Hair, Standardized 10,000 BAU/mL, ALK  2.0 ml  Alternaria Tenuis 1:10 w/v, HS  0.5 ml Epicoccum Nigrum 1:10 w/v, HS 0.5 ml Phoma Herbarum 1:10 w/v, HS  0.5 ml Diluent: HSA qs to 5ml, Disp: 5 mL, Rfl: PRN     ORDER FOR ALLERGEN IMMUNOTHERAPY, Name of Mix: Mix #2  Dust Mite, Dog, Grass, Weeds Dog Hair-Dander, A. P.  1:100 w/v, HS  1.0 ml Dust Mites DP. 10,000 AU/mL, HS  1.0 ml  Speedy Grass (Std) 100,000 BAU/mL, HS 0.4 ml Pigweed, Careless 1:20 w/v, HS 0.5 ml Ragweed Mixed 1:20 w/v ALK  0.5 ml  Sorrel, Sheep 1:20 w/v, HS 0.5 ml Diluent: HSA qs to 5ml, Disp: 5 mL, Rfl: PRN     ORDER FOR ALLERGEN IMMUNOTHERAPY, Name of Mix: Mix #3  Tree  Birch Mix PRW 1:20 w/v, HS  0.5 ml Boxelder-Maple Mix BHR (Boxelder Hard Red) 1:20 w/v, HS  0.5 ml Elm, American 1:20 w/v, HS  0.5 ml Hackberry 1:20 w/v, HS 0.5 ml Hickory, Shagbark 1:20 w/v, HS  0.5 ml [place] Mix RW 1:20 w/v, HS 0.5 ml Oak Mix RVW 1:20 w/v, HS 0.5 ml [place] Tree, Black 1:20 w/v, HS 0.5 ml [place], Black 1:20 w/v, HS 0.5 ml Diluent: HSA qs to 5ml, Disp: 5 mL, Rfl: PRN     tacrolimus (PROTOPIC) 0.1 % external ointment, Apply once daily to rash on the nose and eyelids, Disp: 30 g, Rfl: 11     EPINEPHrine (ANY BX GENERIC EQUIV) 0.3 MG/0.3ML injection 2-pack, Inject 0.3 mLs (0.3 mg) into the muscle as needed for anaphylaxis (Patient not taking: Reported on 7/13/2021), Disp: 0.6 mL, Rfl: 2     EPINEPHrine (AUVI-Q) 0.3 MG/0.3ML injection 2-pack, Inject 0.3 mLs (0.3 mg) into the muscle as needed for anaphylaxis (Patient not taking: Reported on 7/13/2021), Disp: 2 each, Rfl: 2  Allergies   Allergen Reactions     Doxycycline      Eye swelling      Eggs [Chicken-Derived Products (Egg)]        EXAM:   /67 (BP Location: Right arm, Patient Position: Sitting, Cuff Size: Adult Regular)   Pulse 60   SpO2 98%   GENERAL APPEARANCE: alert, cooperative and not in distress  SKIN: no rashes, no lesions  HEAD: atraumatic, normocephalic  EYES: lids and lashes normal, conjunctivae and sclerae clear, EOM full and intact  ENT: no scars or lesions, nasal exam showed no discharge, swelling or lesions noted, tongue midline and normal, soft palate, uvula, and tonsils normal  NECK: no asymmetry, masses, or scars, supple without significant adenopathy  LUNGS: unlabored respirations, no intercostal retractions or accessory muscle use, clear to auscultation without rales or wheezes  HEART: regular rate and rhythm without murmurs and normal S1 and S2  MUSCULOSKELETAL: no  musculoskeletal defects are noted  NEURO: no focal deficits noted  PSYCH: does not appear depressed or anxious      WORKUP:  Cluster Immunotherapy    Cluster Allergen Immunotherapy:    After explaining risks and benefits, and obtaining verbal and written consent, we proceeded with cluster immunotherapy.     VISIT  VIAL COLOR/STRENGTH  DOSES TO BE GIVEN    1  GREEN (1:1000), BLUE (1:100)  GREEN 0.1, GREEN 0.2, GREEN 0.4, BLUE 0.1    2  BLUE (1:100), YELLOW (1:10)  BLUE 0.2, BLUE 0.4, YELLOW 0.05    3  YELLOW (1:10)  YELLOW 0.1, YELLOW 0.15, YELLOW 0.25    4  YELLOW (1:10)  YELLOW 0.35, YELLOW 0.5    5  RED (1:1)  RED 0.05, RED 0.1    6  RED (1:1)  RED 0.15, RED 0.2    7  RED (1:1)  RED 0.3, RED 0.4    8  RED (1:1)  RED 0.5        VISIT 2    After explaining risks and benefits, and obtaining verbal and written consent, we proceeded with cluster immunotherapy.    Time Injection Given: 9:14  Blue 1:100   Dog, Grass, Dust Mite, Weeds   0.2 mL  Blue 1:100   Trees       0.2 mL  Blue 1:100   Cat, Molds      0.2 mL    Time Injection Given: 9:48  Blue 1:100   Dog, Grass, Dust Mite, Weeds   0.4 mL  Blue 1:100   Trees       0.4 mL  Blue 1:100   Cat, Molds      0.4 mL      Time Injection Given: 10:22  Yellow 1:10   Dog, Grass, Dust Mite, Weeds   0.05 mL  Yellow 1:10   Trees       0.05 mL  Yellow 1:10   Cat, Molds      0.05 mL        Start Time: 9:14  End Time: 10:52        VITALS   Time BP Pulse pOx Reaction Treatment   9:45 119/71 61 97% none n/a   10:19 125/75 65 96% none n/a   10:52 120/74 52 98% none n/a       ASSESSMENT/PLAN:  Chago Soni is a 26 year old male here for cluster immunotherapy.    1. Seasonal allergic rhinitis due to pollen - Ross tolerated today's procedure well without developing any signs or symptoms of an adverse reaction.     - return in 7-14 days to continue cluster protocol  - continue pre-medication with antihistamines as directed  - RAPID DESENSITIZATION    2. Allergic rhinitis due to animals - see  above    - RAPID DESENSITIZATION    3. Allergic rhinitis due to dust mite - see above    - RAPID DESENSITIZATION    4. Allergic conjunctivitis, bilateral - see above    - RAPID DESENSITIZATION      Thank you for allowing me to participate in the care of Chago Soni.      Shavonne Trores MD, [facility]  Allergy/Immunology  [facility]      Chart documentation done in part with Dragon Voice Recognition Software. Although reviewed after completion, some word and grammatical errors may remain.    Prior to initiation of cluster immunotherapy RN ensured that patient was feeling healthy, has premedicated with Zyrtec or Allegra yesterday twice daily and this morning. RN also ensured that patient has unexpired Epi-Pen, had no new medication changes, and did not have a reaction after the last allergy shots were given. If the patient has asthma it is well-controlled. The patient has not been ill in the past 7 days. Patient was given allergy injections per cluster immunotherapy protocol 30 minutes apart and vital signs were monitored. Patient was monitored in clinic for 30 minutes after last injection was given and assessed by provider before discharging.     Carly Cornell RN      Again, thank you for allowing me to participate in the care of your patient.        Sincerely,        Shavonne Torres MD

## 2021-07-13 NOTE — PROGRESS NOTES
Prior to initiation of cluster immunotherapy RN ensured that patient was feeling healthy, has premedicated with Zyrtec or Allegra yesterday twice daily and this morning. RN also ensured that patient has unexpired Epi-Pen, had no new medication changes, and did not have a reaction after the last allergy shots were given. If the patient has asthma it is well-controlled. The patient has not been ill in the past 7 days. Patient was given allergy injections per cluster immunotherapy protocol 30 minutes apart and vital signs were monitored. Patient was monitored in clinic for 30 minutes after last injection was given and assessed by provider before discharging.     Carly Cornell RN

## 2021-07-22 ENCOUNTER — OFFICE VISIT (OUTPATIENT)
Dept: ALLERGY | Facility: CLINIC | Age: 26
End: 2021-07-22
Payer: OTHER GOVERNMENT

## 2021-07-22 VITALS
BODY MASS INDEX: 27.2 KG/M2 | SYSTOLIC BLOOD PRESSURE: 115 MMHG | WEIGHT: 195 LBS | OXYGEN SATURATION: 98 % | HEART RATE: 43 BPM | DIASTOLIC BLOOD PRESSURE: 78 MMHG

## 2021-07-22 DIAGNOSIS — J30.81 ALLERGIC RHINITIS DUE TO ANIMALS: ICD-10-CM

## 2021-07-22 DIAGNOSIS — H10.13 ALLERGIC CONJUNCTIVITIS, BILATERAL: ICD-10-CM

## 2021-07-22 DIAGNOSIS — J30.1 SEASONAL ALLERGIC RHINITIS DUE TO POLLEN: Primary | ICD-10-CM

## 2021-07-22 DIAGNOSIS — J30.89 ALLERGIC RHINITIS DUE TO DUST MITE: ICD-10-CM

## 2021-07-22 PROCEDURE — 99207 PR DROP WITH A PROCEDURE: CPT | Performed by: ALLERGY & IMMUNOLOGY

## 2021-07-22 PROCEDURE — 95180 RAPID DESENSITIZATION: CPT | Performed by: ALLERGY & IMMUNOLOGY

## 2021-07-22 NOTE — LETTER
7/22/2021         RE: Chago Soni  [address]  [facility] [address]        Dear Colleague,    Thank you for referring your patient, Chago Soni, to the [facility]. Please see a copy of my visit note below.    Chago Soni was seen in the Allergy Clinic at [facility].      Chago Soni is a 26 year old Choose not to answer male who is seen today for cluster immunotherapy. He had no adverse reactions after his last visit. He has been feeling well with no recent symptoms of fever or illness. Ross pre-medicated with cetirizine as directed prior to today's visit.    History reviewed. No pertinent past medical history.  Family History   Problem Relation Age of Onset     Allergies Mother      Allergies Father      Social History     Tobacco Use     Smoking status: Never Smoker     Smokeless tobacco: Never Used   Substance Use Topics     Alcohol use: Yes     Drug use: Never     Social History     Social History Narrative     Not on file       Past medical, family, and social history were reviewed.    REVIEW OF SYSTEMS:  General: negative for weight gain. negative for weight loss. negative for changes in sleep.   Eyes: negative for itching. negative for redness. negative for tearing/watering. negative for vision changes  Ears: negative for fullness. negative for hearing loss. negative for dizziness.   Nose: negative for snoring.negative for changes in smell. negative for drainage.   Throat: negative for hoarseness. negative for sore throat. negative for trouble swallowing.   Lungs: negative for cough. negative for shortness of breath.negative for wheezing. negative for sputum production.   Cardiovascular: negative for chest pain. negative for swelling of ankles. negative for fast or irregular heartbeat.   Gastrointestinal: negative for nausea. negative for heartburn. negative for acid reflux.   Musculoskeletal: negative for joint pain. negative for joint  stiffness. negative for joint swelling.   Neurologic: negative for seizures. negative for fainting. negative for weakness.   Psychiatric: negative for changes in mood. negative for anxiety.   Endocrine: negative for cold intolerance. negative for heat intolerance. negative for tremors.   Hematologic: negative for easy bruising. negative for easy bleeding.  Integumentary: negative for rash. negative for scaling. negative for nail changes.       Current Outpatient Medications:      cetirizine (ZYRTEC) 10 MG tablet, Take 10 mg by mouth daily, Disp: , Rfl:      erythromycin (ROMYCIN) 5 MG/GM ophthalmic ointment, as needed, Disp: , Rfl:      famotidine (PEPCID) 20 MG tablet, Take 1 tablet (20 mg) by mouth 2 times daily, Disp: 60 tablet, Rfl: 1     fluticasone (FLONASE) 50 MCG/ACT nasal spray, Spray 1 spray into both nostrils daily, Disp: 16 g, Rfl: 1     metroNIDAZOLE (METROGEL) 0.75 % external gel, Apply topically 2 times daily, Disp: 45 g, Rfl: 1     mineral oil-hydrophilic petrolatum (AQUAPHOR) external ointment, Apply topically as needed, Disp: , Rfl:      mometasone (ELOCON) 0.1 % external cream, Apply topically daily To hands, Disp: 45 g, Rfl: 1     montelukast (SINGULAIR) 10 MG tablet, Take 1 tablet (10 mg) by mouth At Bedtime, Disp: 90 tablet, Rfl: 1     mupirocin (BACTROBAN) 2 % external cream, Apply topically 3 times daily To nose, Disp: 30 g, Rfl: 1     ORDER FOR ALLERGEN IMMUNOTHERAPY, Name of Mix: Mix #1  Mold, Cat Cat Hair, Standardized 10,000 BAU/mL, ALK  2.0 ml  Alternaria Tenuis 1:10 w/v, HS  0.5 ml Epicoccum Nigrum 1:10 w/v, HS 0.5 ml Phoma Herbarum 1:10 w/v, HS  0.5 ml Diluent: HSA qs to 5ml, Disp: 5 mL, Rfl: PRN     ORDER FOR ALLERGEN IMMUNOTHERAPY, Name of Mix: Mix #2  Dust Mite, Dog, Grass, Weeds Dog Hair-Dander, A. P.  1:100 w/v, HS  1.0 ml Dust Mites DP. 10,000 AU/mL, HS  1.0 ml  Speedy Grass (Std) 100,000 BAU/mL, HS 0.4 ml Pigweed, Careless 1:20 w/v, HS 0.5 ml Ragweed Mixed 1:20 w/v ALK  0.5 ml  Sorrel, Sheep 1:20 w/v, HS 0.5 ml Diluent: HSA qs to 5ml, Disp: 5 mL, Rfl: PRN     ORDER FOR ALLERGEN IMMUNOTHERAPY, Name of Mix: Mix #3  Tree  Birch Mix PRW 1:20 w/v, HS  0.5 ml Boxelder-Maple Mix BHR (Boxelder Hard Red) 1:20 w/v, HS  0.5 ml Elm, American 1:20 w/v, HS  0.5 ml Hackberry 1:20 w/v, HS 0.5 ml Hickory, Shagbark 1:20 w/v, HS  0.5 ml [place] Mix RW 1:20 w/v, HS 0.5 ml Oak Mix RVW 1:20 w/v, HS 0.5 ml [place] Tree, Black 1:20 w/v, HS 0.5 ml [place], Black 1:20 w/v, HS 0.5 ml Diluent: HSA qs to 5ml, Disp: 5 mL, Rfl: PRN     tacrolimus (PROTOPIC) 0.1 % external ointment, Apply once daily to rash on the nose and eyelids, Disp: 30 g, Rfl: 11     EPINEPHrine (ANY BX GENERIC EQUIV) 0.3 MG/0.3ML injection 2-pack, Inject 0.3 mLs (0.3 mg) into the muscle as needed for anaphylaxis (Patient not taking: Reported on 7/13/2021), Disp: 0.6 mL, Rfl: 2     EPINEPHrine (AUVI-Q) 0.3 MG/0.3ML injection 2-pack, Inject 0.3 mLs (0.3 mg) into the muscle as needed for anaphylaxis (Patient not taking: Reported on 7/13/2021), Disp: 2 each, Rfl: 2  Allergies   Allergen Reactions     Doxycycline      Eye swelling      Eggs [Chicken-Derived Products (Egg)]        EXAM:   /78 (Cuff Size: Adult Regular)   Pulse (!) 43   Wt 88.5 kg (195 lb)   SpO2 98%   BMI 27.20 kg/m    GENERAL APPEARANCE: alert, cooperative and not in distress  SKIN: no rashes, no lesions  HEAD: atraumatic, normocephalic  EYES: lids and lashes normal, conjunctivae and sclerae clear, EOM full and intact  ENT: no scars or lesions, nasal exam showed no discharge, swelling or lesions noted, tongue midline and normal, soft palate, uvula, and tonsils normal  NECK: no asymmetry, masses, or scars, supple without significant adenopathy  LUNGS: unlabored respirations, no intercostal retractions or accessory muscle use, clear to auscultation without rales or wheezes  HEART: regular rate and rhythm without murmurs and normal S1 and S2  MUSCULOSKELETAL: no musculoskeletal  defects are noted  NEURO: no focal deficits noted  PSYCH: does not appear depressed or anxious      WORKUP:  Cluster Immunotherapy    Cluster Allergen Immunotherapy:    After explaining risks and benefits, and obtaining verbal and written consent, we proceeded with cluster immunotherapy.     VISIT  VIAL COLOR/STRENGTH  DOSES TO BE GIVEN    1  GREEN (1:1000), BLUE (1:100)  GREEN 0.1, GREEN 0.2, GREEN 0.4, BLUE 0.1    2  BLUE (1:100), YELLOW (1:10)  BLUE 0.2, BLUE 0.4, YELLOW 0.05    3  YELLOW (1:10)  YELLOW 0.1, YELLOW 0.15, YELLOW 0.25    4  YELLOW (1:10)  YELLOW 0.35, YELLOW 0.5    5  RED (1:1)  RED 0.05, RED 0.1    6  RED (1:1)  RED 0.15, RED 0.2    7  RED (1:1)  RED 0.3, RED 0.4    8  RED (1:1)  RED 0.5        VISIT 3    Time Injection Given: 0956  Yellow 1:10   Dog, Grass, Dust Mite, Weeds    0.1 mL  Yellow 1:10   Trees    0.1 mL  Yellow 1:10   Cat, Molds    0.1 mL      Time Injection Given: 1030  Yellow 1:10   Dog, Grass, Dust Mite, Weeds    0.15 mL  Yellow 1:10   Trees    0.15 mL  Yellow 1:10   Cat, Molds    0.15 mL      Time Injection Given: 1105  Yellow 1:10   Dog, Grass, Dust Mite, Weeds    0.25 mL  Yellow 1:10   Trees    0.25 mL  Yellow 1:10   Cat, Molds    0.25 mL        Start Time: 0956  End Time: 1137        VITALS   Time BP Pulse pOx Reaction Treatment   1028 116/76 48 98% None  N/A   1102 117/76 45 98% none N/A   1137 115/78 43 98% none N/A       ASSESSMENT/PLAN:  Chago Soni is a 26 year old male here for cluster immunotherapy.    1. Seasonal allergic rhinitis due to pollen - Ross tolerated today's procedure well without developing any signs or symptoms of an adverse reaction.     - return in 7-14 days to continue cluster protocol  - continue pre-medication with antihistamines as directed  - RAPID DESENSITIZATION    2. Allergic rhinitis due to animals - see above    - RAPID DESENSITIZATION    3. Allergic rhinitis due to dust mite - see above    - RAPID DESENSITIZATION    4. Allergic conjunctivitis,  bilateral - see above    - RAPID DESENSITIZATION      Thank you for allowing me to participate in the care of Chago Soni.      Shavonne Torres MD, FAAAAI  Allergy/Immunology  [facility]      Chart documentation done in part with Dragon Voice Recognition Software. Although reviewed after completion, some word and grammatical errors may remain.    Prior to initiation of cluster immunotherapy RN ensured that patient was feeling healthy, has premedicated with Zyrtec or Allegra yesterday twice daily and this morning. RN also ensured that patient has unexpired Epi-Pen, had no new medication changes, and did not have a reaction after the last allergy shots were given. If the patient has asthma it is well-controlled. The patient has not been ill in the past 7 days. Patient was given allergy injections per cluster immunotherapy protocol 30 minutes apart and vital signs were monitored. Patient was monitored in clinic for 30 minutes after last injection was given and assessed by provider before discharging.     Lucia GRAY RN        Again, thank you for allowing me to participate in the care of your patient.        Sincerely,        Shavonne Torres MD

## 2021-07-22 NOTE — PROGRESS NOTES
Prior to initiation of cluster immunotherapy RN ensured that patient was feeling healthy, has premedicated with Zyrtec or Allegra yesterday twice daily and this morning. RN also ensured that patient has unexpired Epi-Pen, had no new medication changes, and did not have a reaction after the last allergy shots were given. If the patient has asthma it is well-controlled. The patient has not been ill in the past 7 days. Patient was given allergy injections per cluster immunotherapy protocol 30 minutes apart and vital signs were monitored. Patient was monitored in clinic for 30 minutes after last injection was given and assessed by provider before discharging.     Lucia GRAY RN

## 2021-07-22 NOTE — PROGRESS NOTES
Chago Soni was seen in the Allergy Clinic at [facility].      Chago Soni is a 26 year old Choose not to answer male who is seen today for cluster immunotherapy. He had no adverse reactions after his last visit. He has been feeling well with no recent symptoms of fever or illness. Ross pre-medicated with cetirizine as directed prior to today's visit.    History reviewed. No pertinent past medical history.  Family History   Problem Relation Age of Onset     Allergies Mother      Allergies Father      Social History     Tobacco Use     Smoking status: Never Smoker     Smokeless tobacco: Never Used   Substance Use Topics     Alcohol use: Yes     Drug use: Never     Social History     Social History Narrative     Not on file       Past medical, family, and social history were reviewed.    REVIEW OF SYSTEMS:  General: negative for weight gain. negative for weight loss. negative for changes in sleep.   Eyes: negative for itching. negative for redness. negative for tearing/watering. negative for vision changes  Ears: negative for fullness. negative for hearing loss. negative for dizziness.   Nose: negative for snoring.negative for changes in smell. negative for drainage.   Throat: negative for hoarseness. negative for sore throat. negative for trouble swallowing.   Lungs: negative for cough. negative for shortness of breath.negative for wheezing. negative for sputum production.   Cardiovascular: negative for chest pain. negative for swelling of ankles. negative for fast or irregular heartbeat.   Gastrointestinal: negative for nausea. negative for heartburn. negative for acid reflux.   Musculoskeletal: negative for joint pain. negative for joint stiffness. negative for joint swelling.   Neurologic: negative for seizures. negative for fainting. negative for weakness.   Psychiatric: negative for changes in mood. negative for anxiety.   Endocrine: negative for cold intolerance. negative for heat  intolerance. negative for tremors.   Hematologic: negative for easy bruising. negative for easy bleeding.  Integumentary: negative for rash. negative for scaling. negative for nail changes.       Current Outpatient Medications:      cetirizine (ZYRTEC) 10 MG tablet, Take 10 mg by mouth daily, Disp: , Rfl:      erythromycin (ROMYCIN) 5 MG/GM ophthalmic ointment, as needed, Disp: , Rfl:      famotidine (PEPCID) 20 MG tablet, Take 1 tablet (20 mg) by mouth 2 times daily, Disp: 60 tablet, Rfl: 1     fluticasone (FLONASE) 50 MCG/ACT nasal spray, Spray 1 spray into both nostrils daily, Disp: 16 g, Rfl: 1     metroNIDAZOLE (METROGEL) 0.75 % external gel, Apply topically 2 times daily, Disp: 45 g, Rfl: 1     mineral oil-hydrophilic petrolatum (AQUAPHOR) external ointment, Apply topically as needed, Disp: , Rfl:      mometasone (ELOCON) 0.1 % external cream, Apply topically daily To hands, Disp: 45 g, Rfl: 1     montelukast (SINGULAIR) 10 MG tablet, Take 1 tablet (10 mg) by mouth At Bedtime, Disp: 90 tablet, Rfl: 1     mupirocin (BACTROBAN) 2 % external cream, Apply topically 3 times daily To nose, Disp: 30 g, Rfl: 1     ORDER FOR ALLERGEN IMMUNOTHERAPY, Name of Mix: Mix #1  Mold, Cat Cat Hair, Standardized 10,000 BAU/mL, ALK  2.0 ml  Alternaria Tenuis 1:10 w/v, HS  0.5 ml Epicoccum Nigrum 1:10 w/v, HS 0.5 ml Phoma Herbarum 1:10 w/v, HS  0.5 ml Diluent: HSA qs to 5ml, Disp: 5 mL, Rfl: PRN     ORDER FOR ALLERGEN IMMUNOTHERAPY, Name of Mix: Mix #2  Dust Mite, Dog, Grass, Weeds Dog Hair-Dander, A. P.  1:100 w/v, HS  1.0 ml Dust Mites DP. 10,000 AU/mL, HS  1.0 ml  Speedy Grass (Std) 100,000 BAU/mL, HS 0.4 ml Pigweed, Careless 1:20 w/v, HS 0.5 ml Ragweed Mixed 1:20 w/v ALK  0.5 ml Sorrel, Sheep 1:20 w/v, HS 0.5 ml Diluent: HSA qs to 5ml, Disp: 5 mL, Rfl: PRN     ORDER FOR ALLERGEN IMMUNOTHERAPY, Name of Mix: Mix #3  Tree  Birch Mix PRW 1:20 w/v, HS  0.5 ml Boxelder-Maple Mix BHR (Boxelder Hard Red) 1:20 w/v, HS  0.5 ml Elm,  American 1:20 w/v, HS  0.5 ml Hackberry 1:20 w/v, HS 0.5 ml Hickory, Shagbark 1:20 w/v, HS  0.5 ml [place] Mix RW 1:20 w/v, HS 0.5 ml Oak Mix RVW 1:20 w/v, HS 0.5 ml [place] Tree, Black 1:20 w/v, HS 0.5 ml [place], Black 1:20 w/v, HS 0.5 ml Diluent: HSA qs to 5ml, Disp: 5 mL, Rfl: PRN     tacrolimus (PROTOPIC) 0.1 % external ointment, Apply once daily to rash on the nose and eyelids, Disp: 30 g, Rfl: 11     EPINEPHrine (ANY BX GENERIC EQUIV) 0.3 MG/0.3ML injection 2-pack, Inject 0.3 mLs (0.3 mg) into the muscle as needed for anaphylaxis (Patient not taking: Reported on 7/13/2021), Disp: 0.6 mL, Rfl: 2     EPINEPHrine (AUVI-Q) 0.3 MG/0.3ML injection 2-pack, Inject 0.3 mLs (0.3 mg) into the muscle as needed for anaphylaxis (Patient not taking: Reported on 7/13/2021), Disp: 2 each, Rfl: 2  Allergies   Allergen Reactions     Doxycycline      Eye swelling      Eggs [Chicken-Derived Products (Egg)]        EXAM:   /78 (Cuff Size: Adult Regular)   Pulse (!) 43   Wt 88.5 kg (195 lb)   SpO2 98%   BMI 27.20 kg/m    GENERAL APPEARANCE: alert, cooperative and not in distress  SKIN: no rashes, no lesions  HEAD: atraumatic, normocephalic  EYES: lids and lashes normal, conjunctivae and sclerae clear, EOM full and intact  ENT: no scars or lesions, nasal exam showed no discharge, swelling or lesions noted, tongue midline and normal, soft palate, uvula, and tonsils normal  NECK: no asymmetry, masses, or scars, supple without significant adenopathy  LUNGS: unlabored respirations, no intercostal retractions or accessory muscle use, clear to auscultation without rales or wheezes  HEART: regular rate and rhythm without murmurs and normal S1 and S2  MUSCULOSKELETAL: no musculoskeletal defects are noted  NEURO: no focal deficits noted  PSYCH: does not appear depressed or anxious      WORKUP:  Cluster Immunotherapy    Cluster Allergen Immunotherapy:    After explaining risks and benefits, and obtaining verbal and written consent, we  proceeded with cluster immunotherapy.     VISIT  VIAL COLOR/STRENGTH  DOSES TO BE GIVEN    1  GREEN (1:1000), BLUE (1:100)  GREEN 0.1, GREEN 0.2, GREEN 0.4, BLUE 0.1    2  BLUE (1:100), YELLOW (1:10)  BLUE 0.2, BLUE 0.4, YELLOW 0.05    3  YELLOW (1:10)  YELLOW 0.1, YELLOW 0.15, YELLOW 0.25    4  YELLOW (1:10)  YELLOW 0.35, YELLOW 0.5    5  RED (1:1)  RED 0.05, RED 0.1    6  RED (1:1)  RED 0.15, RED 0.2    7  RED (1:1)  RED 0.3, RED 0.4    8  RED (1:1)  RED 0.5        VISIT 3    Time Injection Given: 0956  Yellow 1:10   Dog, Grass, Dust Mite, Weeds    0.1 mL  Yellow 1:10   Trees    0.1 mL  Yellow 1:10   Cat, Molds    0.1 mL      Time Injection Given: 1030  Yellow 1:10   Dog, Grass, Dust Mite, Weeds    0.15 mL  Yellow 1:10   Trees    0.15 mL  Yellow 1:10   Cat, Molds    0.15 mL      Time Injection Given: 1105  Yellow 1:10   Dog, Grass, Dust Mite, Weeds    0.25 mL  Yellow 1:10   Trees    0.25 mL  Yellow 1:10   Cat, Molds    0.25 mL        Start Time: 0956  End Time: 1137        VITALS   Time BP Pulse pOx Reaction Treatment   1028 116/76 48 98% None  N/A   1102 117/76 45 98% none N/A   1137 115/78 43 98% none N/A       ASSESSMENT/PLAN:  Chago Soni is a 26 year old male here for cluster immunotherapy.    1. Seasonal allergic rhinitis due to pollen - Ross tolerated today's procedure well without developing any signs or symptoms of an adverse reaction.     - return in 7-14 days to continue cluster protocol  - continue pre-medication with antihistamines as directed  - RAPID DESENSITIZATION    2. Allergic rhinitis due to animals - see above    - RAPID DESENSITIZATION    3. Allergic rhinitis due to dust mite - see above    - RAPID DESENSITIZATION    4. Allergic conjunctivitis, bilateral - see above    - RAPID DESENSITIZATION      Thank you for allowing me to participate in the care of Chago Soni.      Shavonne Torres MD, FAAAAI  Allergy/Immunology  [facility] [place]  [facility]  Specialty Clinic      Chart documentation done in part with Dragon Voice Recognition Software. Although reviewed after completion, some word and grammatical errors may remain.

## 2021-07-29 ENCOUNTER — OFFICE VISIT (OUTPATIENT)
Dept: ALLERGY | Facility: CLINIC | Age: 26
End: 2021-07-29
Payer: OTHER GOVERNMENT

## 2021-07-29 VITALS — HEART RATE: 56 BPM | OXYGEN SATURATION: 97 % | SYSTOLIC BLOOD PRESSURE: 120 MMHG | DIASTOLIC BLOOD PRESSURE: 78 MMHG

## 2021-07-29 DIAGNOSIS — J30.1 SEASONAL ALLERGIC RHINITIS DUE TO POLLEN: Primary | ICD-10-CM

## 2021-07-29 DIAGNOSIS — J30.81 ALLERGIC RHINITIS DUE TO ANIMALS: ICD-10-CM

## 2021-07-29 DIAGNOSIS — H10.13 ALLERGIC CONJUNCTIVITIS, BILATERAL: ICD-10-CM

## 2021-07-29 DIAGNOSIS — J30.89 ALLERGIC RHINITIS DUE TO DUST MITE: ICD-10-CM

## 2021-07-29 PROCEDURE — 99207 PR DROP WITH A PROCEDURE: CPT | Performed by: ALLERGY & IMMUNOLOGY

## 2021-07-29 PROCEDURE — 95180 RAPID DESENSITIZATION: CPT | Performed by: ALLERGY & IMMUNOLOGY

## 2021-07-29 NOTE — LETTER
7/29/2021         RE: Chago Soni  [address]  [place] [facility] [place] [address]        Dear Colleague,    Thank you for referring your patient, Chago Soni, to the [facility]. Please see a copy of my visit note below.    Chago Soni was seen in the Allergy Clinic at [facility].      Chago Soni is a 26 year old Choose not to answer male who is seen today for for cluster immunotherapy. He had no adverse reactions after his last visit. He has been feeling well with no recent symptoms of fever or illness. Ross pre-medicated with cetirizine as directed prior to today's visit.    History reviewed. No pertinent past medical history.  Family History   Problem Relation Age of Onset     Allergies Mother      Allergies Father      Social History     Tobacco Use     Smoking status: Never Smoker     Smokeless tobacco: Never Used   Substance Use Topics     Alcohol use: Yes     Drug use: Never     Social History     Social History Narrative     Not on file       Past medical, family, and social history were reviewed.    REVIEW OF SYSTEMS:  General: negative for weight gain. negative for weight loss. negative for changes in sleep.   Eyes: negative for itching. negative for redness. negative for tearing/watering. negative for vision changes  Ears: negative for fullness. negative for hearing loss. negative for dizziness.   Nose: negative for snoring.negative for changes in smell. negative for drainage.   Throat: negative for hoarseness. negative for sore throat. negative for trouble swallowing.   Lungs: negative for cough. negative for shortness of breath.negative for wheezing. negative for sputum production.   Cardiovascular: negative for chest pain. negative for swelling of ankles. negative for fast or irregular heartbeat.   Gastrointestinal: negative for nausea. negative for heartburn. negative for acid reflux.   Musculoskeletal: negative for joint pain. negative for joint  stiffness. negative for joint swelling.   Neurologic: negative for seizures. negative for fainting. negative for weakness.   Psychiatric: negative for changes in mood. negative for anxiety.   Endocrine: negative for cold intolerance. negative for heat intolerance. negative for tremors.   Hematologic: negative for easy bruising. negative for easy bleeding.  Integumentary: negative for rash. negative for scaling. negative for nail changes.       Current Outpatient Medications:      cetirizine (ZYRTEC) 10 MG tablet, Take 10 mg by mouth daily, Disp: , Rfl:      EPINEPHrine (AUVI-Q) 0.3 MG/0.3ML injection 2-pack, Inject 0.3 mLs (0.3 mg) into the muscle as needed for anaphylaxis, Disp: 2 each, Rfl: 2     erythromycin (ROMYCIN) 5 MG/GM ophthalmic ointment, as needed, Disp: , Rfl:      famotidine (PEPCID) 20 MG tablet, Take 1 tablet (20 mg) by mouth 2 times daily, Disp: 60 tablet, Rfl: 1     fluticasone (FLONASE) 50 MCG/ACT nasal spray, Spray 1 spray into both nostrils daily, Disp: 16 g, Rfl: 1     metroNIDAZOLE (METROGEL) 0.75 % external gel, Apply topically 2 times daily, Disp: 45 g, Rfl: 1     mineral oil-hydrophilic petrolatum (AQUAPHOR) external ointment, Apply topically as needed, Disp: , Rfl:      mometasone (ELOCON) 0.1 % external cream, Apply topically daily To hands, Disp: 45 g, Rfl: 1     montelukast (SINGULAIR) 10 MG tablet, Take 1 tablet (10 mg) by mouth At Bedtime, Disp: 90 tablet, Rfl: 1     mupirocin (BACTROBAN) 2 % external cream, Apply topically 3 times daily To nose, Disp: 30 g, Rfl: 1     ORDER FOR ALLERGEN IMMUNOTHERAPY, Name of Mix: Mix #1  Mold, Cat Cat Hair, Standardized 10,000 BAU/mL, ALK  2.0 ml  Alternaria Tenuis 1:10 w/v, HS  0.5 ml Epicoccum Nigrum 1:10 w/v, HS 0.5 ml Phoma Herbarum 1:10 w/v, HS  0.5 ml Diluent: HSA qs to 5ml, Disp: 5 mL, Rfl: PRN     ORDER FOR ALLERGEN IMMUNOTHERAPY, Name of Mix: Mix #2  Dust Mite, Dog, Grass, Weeds Dog Hair-Dander, A. P.  1:100 w/v, HS  1.0 ml Dust Mites DP.  10,000 AU/mL, HS  1.0 ml  Speedy Grass (Std) 100,000 BAU/mL, HS 0.4 ml Pigweed, Careless 1:20 w/v, HS 0.5 ml Ragweed Mixed 1:20 w/v ALK  0.5 ml Sorrel, Sheep 1:20 w/v, HS 0.5 ml Diluent: HSA qs to 5ml, Disp: 5 mL, Rfl: PRN     ORDER FOR ALLERGEN IMMUNOTHERAPY, Name of Mix: Mix #3  Tree  Birch Mix PRW 1:20 w/v, HS  0.5 ml Boxelder-Maple Mix BHR (Boxelder Hard Red) 1:20 w/v, HS  0.5 ml Elm, American 1:20 w/v, HS  0.5 ml Hackberry 1:20 w/v, HS 0.5 ml Hickory, Shagbark 1:20 w/v, HS  0.5 ml [place] Mix RW 1:20 w/v, HS 0.5 ml Oak Mix RVW 1:20 w/v, HS 0.5 ml [place] Tree, Black 1:20 w/v, HS 0.5 ml [place], Black 1:20 w/v, HS 0.5 ml Diluent: HSA qs to 5ml, Disp: 5 mL, Rfl: PRN     tacrolimus (PROTOPIC) 0.1 % external ointment, Apply once daily to rash on the nose and eyelids, Disp: 30 g, Rfl: 11     EPINEPHrine (ANY BX GENERIC EQUIV) 0.3 MG/0.3ML injection 2-pack, Inject 0.3 mLs (0.3 mg) into the muscle as needed for anaphylaxis (Patient not taking: Reported on 7/13/2021), Disp: 0.6 mL, Rfl: 2  Allergies   Allergen Reactions     Doxycycline      Eye swelling      Eggs [Chicken-Derived Products (Egg)]        EXAM:   /78 (BP Location: Right arm, Patient Position: Sitting, Cuff Size: Adult Regular)   Pulse 58   SpO2 97%   GENERAL APPEARANCE: alert, cooperative and not in distress  SKIN: no rashes, no lesions  HEAD: atraumatic, normocephalic  EYES: lids and lashes normal, conjunctivae and sclerae clear, EOM full and intact  ENT: no scars or lesions, nasal exam showed no discharge, swelling or lesions noted, tongue midline and normal, soft palate, uvula, and tonsils normal  NECK: no asymmetry, masses, or scars, supple without significant adenopathy  LUNGS: unlabored respirations, no intercostal retractions or accessory muscle use, clear to auscultation without rales or wheezes  HEART: regular rate and rhythm without murmurs and normal S1 and S2  MUSCULOSKELETAL: no musculoskeletal defects are noted  NEURO: no focal  deficits noted  PSYCH: does not appear depressed or anxious      WORKUP:  Cluster Immunotherapy    Cluster Allergen Immunotherapy:    After explaining risks and benefits, and obtaining verbal and written consent, we proceeded with cluster immunotherapy.     VISIT  VIAL COLOR/STRENGTH  DOSES TO BE GIVEN    1  GREEN (1:1000), BLUE (1:100)  GREEN 0.1, GREEN 0.2, GREEN 0.4, BLUE 0.1    2  BLUE (1:100), YELLOW (1:10)  BLUE 0.2, BLUE 0.4, YELLOW 0.05    3  YELLOW (1:10)  YELLOW 0.1, YELLOW 0.15, YELLOW 0.25    4  YELLOW (1:10)  YELLOW 0.35, YELLOW 0.5    5  RED (1:1)  RED 0.05, RED 0.1    6  RED (1:1)  RED 0.15, RED 0.2    7  RED (1:1)  RED 0.3, RED 0.4    8  RED (1:1)  RED 0.5        VISIT 4    Time Injection Given: 0951  Yellow 1:10   Dog, Grass, Dust Mite, Weeds   0.35 mL  Yellow 1:10   Trees       0.35 mL  Yellow 1:10   Cat, Molds      0.35 mL    Time Injection Given: 1024  Yellow 1:10   Dog, Grass, Dust Mite, Weeds   0.5 mL  Yellow 1:10   Trees       0.5 mL  Yellow 1:10   Cat, Molds      0.5 mL      Start Time: 0951  End Time: 1056      VITALS   Time BP Pulse pOx Reaction Treatment   1022 116/78 56 97% none N/A   1056 120/78 56 97% none N/A     ASSESSMENT/PLAN:  Chago Soni is a 26 year old male here for cluster immunotherapy.    1. Seasonal allergic rhinitis due to pollen - Ross tolerated today's procedure well without developing any signs or symptoms of an adverse reaction.     - return in 7-14 days to continue cluster protocol  - continue pre-medication with antihistamines as directed  - RAPID DESENSITIZATION    2. Allergic rhinitis due to animals    - RAPID DESENSITIZATION    3. Allergic rhinitis due to dust mite    - RAPID DESENSITIZATION    4. Allergic conjunctivitis, bilateral    - RAPID DESENSITIZATION      Thank you for allowing me to participate in the care of Chago Soni.      Shavonne Torres MD, FAAAAI  Allergy/Immunology  [facility]  Specialty Clinic      Chart documentation done in part with Dragon Voice Recognition Software. Although reviewed after completion, some word and grammatical errors may remain.    Prior to initiation of cluster immunotherapy RN ensured that patient was feeling healthy, has premedicated with Zyrtec or Allegra yesterday twice daily and this morning. RN also ensured that patient has unexpired Epi-Pen, had no new medication changes, and did not have a reaction after the last allergy shots were given. If the patient has asthma it is well-controlled. The patient has not been ill in the past 7 days. Patient was given allergy injections per cluster immunotherapy protocol 30 minutes apart and vital signs were monitored. Patient was monitored in clinic for 30 minutes after last injection was given and assessed by provider before discharging.     RIA Myers      Again, thank you for allowing me to participate in the care of your patient.        Sincerely,        Shavonne Torres MD

## 2021-07-29 NOTE — PROGRESS NOTES
Prior to initiation of cluster immunotherapy RN ensured that patient was feeling healthy, has premedicated with Zyrtec or Allegra yesterday twice daily and this morning. RN also ensured that patient has unexpired Epi-Pen, had no new medication changes, and did not have a reaction after the last allergy shots were given. If the patient has asthma it is well-controlled. The patient has not been ill in the past 7 days. Patient was given allergy injections per cluster immunotherapy protocol 30 minutes apart and vital signs were monitored. Patient was monitored in clinic for 30 minutes after last injection was given and assessed by provider before discharging.     RIA Myers

## 2021-07-29 NOTE — PROGRESS NOTES
Chago Soni was seen in the Allergy Clinic at [facility].      Chago Soni is a 26 year old Choose not to answer male who is seen today for for cluster immunotherapy. He had no adverse reactions after his last visit. He has been feeling well with no recent symptoms of fever or illness. Ross pre-medicated with cetirizine as directed prior to today's visit.    History reviewed. No pertinent past medical history.  Family History   Problem Relation Age of Onset     Allergies Mother      Allergies Father      Social History     Tobacco Use     Smoking status: Never Smoker     Smokeless tobacco: Never Used   Substance Use Topics     Alcohol use: Yes     Drug use: Never     Social History     Social History Narrative     Not on file       Past medical, family, and social history were reviewed.    REVIEW OF SYSTEMS:  General: negative for weight gain. negative for weight loss. negative for changes in sleep.   Eyes: negative for itching. negative for redness. negative for tearing/watering. negative for vision changes  Ears: negative for fullness. negative for hearing loss. negative for dizziness.   Nose: negative for snoring.negative for changes in smell. negative for drainage.   Throat: negative for hoarseness. negative for sore throat. negative for trouble swallowing.   Lungs: negative for cough. negative for shortness of breath.negative for wheezing. negative for sputum production.   Cardiovascular: negative for chest pain. negative for swelling of ankles. negative for fast or irregular heartbeat.   Gastrointestinal: negative for nausea. negative for heartburn. negative for acid reflux.   Musculoskeletal: negative for joint pain. negative for joint stiffness. negative for joint swelling.   Neurologic: negative for seizures. negative for fainting. negative for weakness.   Psychiatric: negative for changes in mood. negative for anxiety.   Endocrine: negative for cold intolerance. negative for heat  intolerance. negative for tremors.   Hematologic: negative for easy bruising. negative for easy bleeding.  Integumentary: negative for rash. negative for scaling. negative for nail changes.       Current Outpatient Medications:      cetirizine (ZYRTEC) 10 MG tablet, Take 10 mg by mouth daily, Disp: , Rfl:      EPINEPHrine (AUVI-Q) 0.3 MG/0.3ML injection 2-pack, Inject 0.3 mLs (0.3 mg) into the muscle as needed for anaphylaxis, Disp: 2 each, Rfl: 2     erythromycin (ROMYCIN) 5 MG/GM ophthalmic ointment, as needed, Disp: , Rfl:      famotidine (PEPCID) 20 MG tablet, Take 1 tablet (20 mg) by mouth 2 times daily, Disp: 60 tablet, Rfl: 1     fluticasone (FLONASE) 50 MCG/ACT nasal spray, Spray 1 spray into both nostrils daily, Disp: 16 g, Rfl: 1     metroNIDAZOLE (METROGEL) 0.75 % external gel, Apply topically 2 times daily, Disp: 45 g, Rfl: 1     mineral oil-hydrophilic petrolatum (AQUAPHOR) external ointment, Apply topically as needed, Disp: , Rfl:      mometasone (ELOCON) 0.1 % external cream, Apply topically daily To hands, Disp: 45 g, Rfl: 1     montelukast (SINGULAIR) 10 MG tablet, Take 1 tablet (10 mg) by mouth At Bedtime, Disp: 90 tablet, Rfl: 1     mupirocin (BACTROBAN) 2 % external cream, Apply topically 3 times daily To nose, Disp: 30 g, Rfl: 1     ORDER FOR ALLERGEN IMMUNOTHERAPY, Name of Mix: Mix #1  Mold, Cat Cat Hair, Standardized 10,000 BAU/mL, ALK  2.0 ml  Alternaria Tenuis 1:10 w/v, HS  0.5 ml Epicoccum Nigrum 1:10 w/v, HS 0.5 ml Phoma Herbarum 1:10 w/v, HS  0.5 ml Diluent: HSA qs to 5ml, Disp: 5 mL, Rfl: PRN     ORDER FOR ALLERGEN IMMUNOTHERAPY, Name of Mix: Mix #2  Dust Mite, Dog, Grass, Weeds Dog Hair-Dander, A. P.  1:100 w/v, HS  1.0 ml Dust Mites DP. 10,000 AU/mL, HS  1.0 ml  Speedy Grass (Std) 100,000 BAU/mL, HS 0.4 ml Pigweed, Careless 1:20 w/v, HS 0.5 ml Ragweed Mixed 1:20 w/v ALK  0.5 ml Sorrel, Sheep 1:20 w/v, HS 0.5 ml Diluent: HSA qs to 5ml, Disp: 5 mL, Rfl: PRN     ORDER FOR ALLERGEN  IMMUNOTHERAPY, Name of Mix: Mix #3  Tree  Birch Mix PRW 1:20 w/v, HS  0.5 ml Boxelder-Maple Mix BHR (Boxelder Hard Red) 1:20 w/v, HS  0.5 ml Elm, American 1:20 w/v, HS  0.5 ml Hackberry 1:20 w/v, HS 0.5 ml Hickory, Shagbark 1:20 w/v, HS  0.5 ml [place] Mix RW 1:20 w/v, HS 0.5 ml Oak Mix RVW 1:20 w/v, HS 0.5 ml [place] Tree, Black 1:20 w/v, HS 0.5 ml [place], Black 1:20 w/v, HS 0.5 ml Diluent: HSA qs to 5ml, Disp: 5 mL, Rfl: PRN     tacrolimus (PROTOPIC) 0.1 % external ointment, Apply once daily to rash on the nose and eyelids, Disp: 30 g, Rfl: 11     EPINEPHrine (ANY BX GENERIC EQUIV) 0.3 MG/0.3ML injection 2-pack, Inject 0.3 mLs (0.3 mg) into the muscle as needed for anaphylaxis (Patient not taking: Reported on 7/13/2021), Disp: 0.6 mL, Rfl: 2  Allergies   Allergen Reactions     Doxycycline      Eye swelling      Eggs [Chicken-Derived Products (Egg)]        EXAM:   /78 (BP Location: Right arm, Patient Position: Sitting, Cuff Size: Adult Regular)   Pulse 58   SpO2 97%   GENERAL APPEARANCE: alert, cooperative and not in distress  SKIN: no rashes, no lesions  HEAD: atraumatic, normocephalic  EYES: lids and lashes normal, conjunctivae and sclerae clear, EOM full and intact  ENT: no scars or lesions, nasal exam showed no discharge, swelling or lesions noted, tongue midline and normal, soft palate, uvula, and tonsils normal  NECK: no asymmetry, masses, or scars, supple without significant adenopathy  LUNGS: unlabored respirations, no intercostal retractions or accessory muscle use, clear to auscultation without rales or wheezes  HEART: regular rate and rhythm without murmurs and normal S1 and S2  MUSCULOSKELETAL: no musculoskeletal defects are noted  NEURO: no focal deficits noted  PSYCH: does not appear depressed or anxious      WORKUP:  Cluster Immunotherapy    Cluster Allergen Immunotherapy:    After explaining risks and benefits, and obtaining verbal and written consent, we proceeded with cluster  immunotherapy.     VISIT  VIAL COLOR/STRENGTH  DOSES TO BE GIVEN    1  GREEN (1:1000), BLUE (1:100)  GREEN 0.1, GREEN 0.2, GREEN 0.4, BLUE 0.1    2  BLUE (1:100), YELLOW (1:10)  BLUE 0.2, BLUE 0.4, YELLOW 0.05    3  YELLOW (1:10)  YELLOW 0.1, YELLOW 0.15, YELLOW 0.25    4  YELLOW (1:10)  YELLOW 0.35, YELLOW 0.5    5  RED (1:1)  RED 0.05, RED 0.1    6  RED (1:1)  RED 0.15, RED 0.2    7  RED (1:1)  RED 0.3, RED 0.4    8  RED (1:1)  RED 0.5        VISIT 4    Time Injection Given: 0951  Yellow 1:10   Dog, Grass, Dust Mite, Weeds   0.35 mL  Yellow 1:10   Trees       0.35 mL  Yellow 1:10   Cat, Molds      0.35 mL    Time Injection Given: 1024  Yellow 1:10   Dog, Grass, Dust Mite, Weeds   0.5 mL  Yellow 1:10   Trees       0.5 mL  Yellow 1:10   Cat, Molds      0.5 mL      Start Time: 0951  End Time: 1056      VITALS   Time BP Pulse pOx Reaction Treatment   1022 116/78 56 97% none N/A   1056 120/78 56 97% none N/A     ASSESSMENT/PLAN:  Chago Soni is a 26 year old male here for cluster immunotherapy.    1. Seasonal allergic rhinitis due to pollen - Ross tolerated today's procedure well without developing any signs or symptoms of an adverse reaction.     - return in 7-14 days to continue cluster protocol  - continue pre-medication with antihistamines as directed  - RAPID DESENSITIZATION    2. Allergic rhinitis due to animals    - RAPID DESENSITIZATION    3. Allergic rhinitis due to dust mite    - RAPID DESENSITIZATION    4. Allergic conjunctivitis, bilateral    - RAPID DESENSITIZATION      Thank you for allowing me to participate in the care of Chago Soni.      Shavonne Torres MD, FAAAAI  Allergy/Immunology  [facility]      Chart documentation done in part with Dragon Voice Recognition Software. Although reviewed after completion, some word and grammatical errors may remain.

## 2021-08-05 ENCOUNTER — OFFICE VISIT (OUTPATIENT)
Dept: ALLERGY | Facility: CLINIC | Age: 26
End: 2021-08-05
Payer: OTHER GOVERNMENT

## 2021-08-05 VITALS
DIASTOLIC BLOOD PRESSURE: 73 MMHG | HEART RATE: 60 BPM | WEIGHT: 203 LBS | OXYGEN SATURATION: 99 % | BODY MASS INDEX: 28.31 KG/M2 | SYSTOLIC BLOOD PRESSURE: 115 MMHG

## 2021-08-05 DIAGNOSIS — J30.89 ALLERGIC RHINITIS DUE TO DUST MITE: ICD-10-CM

## 2021-08-05 DIAGNOSIS — J30.81 ALLERGIC RHINITIS DUE TO ANIMALS: ICD-10-CM

## 2021-08-05 DIAGNOSIS — J30.1 SEASONAL ALLERGIC RHINITIS DUE TO POLLEN: Primary | ICD-10-CM

## 2021-08-05 DIAGNOSIS — H10.13 ALLERGIC CONJUNCTIVITIS, BILATERAL: ICD-10-CM

## 2021-08-05 PROCEDURE — 95180 RAPID DESENSITIZATION: CPT | Performed by: ALLERGY & IMMUNOLOGY

## 2021-08-05 PROCEDURE — 99207 PR DROP WITH A PROCEDURE: CPT | Performed by: ALLERGY & IMMUNOLOGY

## 2021-08-05 NOTE — LETTER
8/5/2021         RE: Chago Soni  [address]  [place] [address]        Dear Colleague,    Thank you for referring your patient, Chago Soni, to the [facility]. Please see a copy of my visit note below.    Chago Soni was seen in the Allergy Clinic at [facility].      Chago Soni is a 26 year old Choose not to answer male who is seen today for cluster immunotherapy. He had no adverse reactions after his last visit. He has been feeling well with no recent symptoms of fever or illness. Ross pre-medicated with cetirizine as directed prior to today's visit.    History reviewed. No pertinent past medical history.  Family History   Problem Relation Age of Onset     Allergies Mother      Allergies Father      Social History     Tobacco Use     Smoking status: Never Smoker     Smokeless tobacco: Never Used   Substance Use Topics     Alcohol use: Yes     Drug use: Never     Social History     Social History Narrative     Not on file       Past medical, family, and social history were reviewed.    REVIEW OF SYSTEMS:  General: negative for weight gain. negative for weight loss. negative for changes in sleep.   Eyes: negative for itching. negative for redness. negative for tearing/watering. negative for vision changes  Ears: negative for fullness. negative for hearing loss. negative for dizziness.   Nose: negative for snoring.negative for changes in smell. negative for drainage.   Throat: negative for hoarseness. negative for sore throat. negative for trouble swallowing.   Lungs: negative for cough. negative for shortness of breath.negative for wheezing. negative for sputum production.   Cardiovascular: negative for chest pain. negative for swelling of ankles. negative for fast or irregular heartbeat.   Gastrointestinal: negative for nausea. negative for heartburn. negative for acid reflux.   Musculoskeletal: negative for joint pain. negative for joint  stiffness. negative for joint swelling.   Neurologic: negative for seizures. negative for fainting. negative for weakness.   Psychiatric: negative for changes in mood. negative for anxiety.   Endocrine: negative for cold intolerance. negative for heat intolerance. negative for tremors.   Hematologic: negative for easy bruising. negative for easy bleeding.  Integumentary: negative for rash. negative for scaling. negative for nail changes.       Current Outpatient Medications:      cetirizine (ZYRTEC) 10 MG tablet, Take 10 mg by mouth daily, Disp: , Rfl:      EPINEPHrine (AUVI-Q) 0.3 MG/0.3ML injection 2-pack, Inject 0.3 mLs (0.3 mg) into the muscle as needed for anaphylaxis, Disp: 2 each, Rfl: 2     erythromycin (ROMYCIN) 5 MG/GM ophthalmic ointment, as needed, Disp: , Rfl:      famotidine (PEPCID) 20 MG tablet, Take 1 tablet (20 mg) by mouth 2 times daily, Disp: 60 tablet, Rfl: 1     fluticasone (FLONASE) 50 MCG/ACT nasal spray, Spray 1 spray into both nostrils daily, Disp: 16 g, Rfl: 1     metroNIDAZOLE (METROGEL) 0.75 % external gel, Apply topically 2 times daily, Disp: 45 g, Rfl: 1     mineral oil-hydrophilic petrolatum (AQUAPHOR) external ointment, Apply topically as needed, Disp: , Rfl:      mometasone (ELOCON) 0.1 % external cream, Apply topically daily To hands, Disp: 45 g, Rfl: 1     montelukast (SINGULAIR) 10 MG tablet, Take 1 tablet (10 mg) by mouth At Bedtime, Disp: 90 tablet, Rfl: 1     mupirocin (BACTROBAN) 2 % external cream, Apply topically 3 times daily To nose, Disp: 30 g, Rfl: 1     ORDER FOR ALLERGEN IMMUNOTHERAPY, Name of Mix: Mix #1  Mold, Cat Cat Hair, Standardized 10,000 BAU/mL, ALK  2.0 ml  Alternaria Tenuis 1:10 w/v, HS  0.5 ml Epicoccum Nigrum 1:10 w/v, HS 0.5 ml Phoma Herbarum 1:10 w/v, HS  0.5 ml Diluent: HSA qs to 5ml, Disp: 5 mL, Rfl: PRN     ORDER FOR ALLERGEN IMMUNOTHERAPY, Name of Mix: Mix #2  Dust Mite, Dog, Grass, Weeds Dog Hair-Dander, A. P.  1:100 w/v, HS  1.0 ml Dust Mites DP.  10,000 AU/mL, HS  1.0 ml  Speedy Grass (Std) 100,000 BAU/mL, HS 0.4 ml Pigweed, Careless 1:20 w/v, HS 0.5 ml Ragweed Mixed 1:20 w/v ALK  0.5 ml Sorrel, Sheep 1:20 w/v, HS 0.5 ml Diluent: HSA qs to 5ml, Disp: 5 mL, Rfl: PRN     ORDER FOR ALLERGEN IMMUNOTHERAPY, Name of Mix: Mix #3  Tree  Birch Mix PRW 1:20 w/v, HS  0.5 ml Boxelder-Maple Mix BHR (Boxelder Hard Red) 1:20 w/v, HS  0.5 ml Elm, American 1:20 w/v, HS  0.5 ml Hackberry 1:20 w/v, HS 0.5 ml Hickory, Shagbark 1:20 w/v, HS  0.5 ml [place] Mix RW 1:20 w/v, HS 0.5 ml Oak Mix RVW 1:20 w/v, HS 0.5 ml [place] Tree, Black 1:20 w/v, HS 0.5 ml [place], Black 1:20 w/v, HS 0.5 ml Diluent: HSA qs to 5ml, Disp: 5 mL, Rfl: PRN     tacrolimus (PROTOPIC) 0.1 % external ointment, Apply once daily to rash on the nose and eyelids, Disp: 30 g, Rfl: 11     EPINEPHrine (ANY BX GENERIC EQUIV) 0.3 MG/0.3ML injection 2-pack, Inject 0.3 mLs (0.3 mg) into the muscle as needed for anaphylaxis (Patient not taking: Reported on 8/5/2021), Disp: 0.6 mL, Rfl: 2  Allergies   Allergen Reactions     Doxycycline      Eye swelling      Eggs [Chicken-Derived Products (Egg)]        EXAM:   /73   Pulse 60   Wt 92.1 kg (203 lb)   SpO2 99%   BMI 28.31 kg/m    GENERAL APPEARANCE: alert, cooperative and not in distress  SKIN: no rashes, no lesions  HEAD: atraumatic, normocephalic  EYES: lids and lashes normal, conjunctivae and sclerae clear, EOM full and intact  ENT: no scars or lesions, nasal exam showed no discharge, swelling or lesions noted, tongue midline and normal, soft palate, uvula, and tonsils normal  NECK: no asymmetry, masses, or scars, supple without significant adenopathy  LUNGS: unlabored respirations, no intercostal retractions or accessory muscle use, clear to auscultation without rales or wheezes  HEART: regular rate and rhythm without murmurs and normal S1 and S2  MUSCULOSKELETAL: no musculoskeletal defects are noted  NEURO: no focal deficits noted  PSYCH: does not appear  depressed or anxious      WORKUP:  Cluster Immunotherapy    See nursing note for full flowsheet/documentation    ASSESSMENT/PLAN:  Chago Soni is a 26 year old male here for cluster immunotherapy.    1. Seasonal allergic rhinitis due to pollen - Ross tolerated today's procedure well without developing any signs or symptoms of an adverse reaction.     - return in 7-14 days to continue cluster protocol  - continue pre-medication with antihistamines as directed  - RAPID DESENSITIZATION    2. Allergic rhinitis due to animals    - RAPID DESENSITIZATION    3. Allergic rhinitis due to dust mite    - RAPID DESENSITIZATION    4. Allergic conjunctivitis, bilateral    - RAPID DESENSITIZATION      Thank you for allowing me to participate in the care of Chago Soni.      Shavonne Torres MD, [facility]  Allergy/Immunology  [facility]      Chart documentation done in part with Dragon Voice Recognition Software. Although reviewed after completion, some word and grammatical errors may remain.    Cluster Allergen Immunotherapy:    After explaining risks and benefits, and obtaining verbal and written consent, we proceeded with cluster immunotherapy.     VISIT  VIAL COLOR/STRENGTH  DOSES TO BE GIVEN    1  GREEN (1:1000), BLUE (1:100)  GREEN 0.1, GREEN 0.2, GREEN 0.4, BLUE 0.1    2  BLUE (1:100), YELLOW (1:10)  BLUE 0.2, BLUE 0.4, YELLOW 0.05    3  YELLOW (1:10)  YELLOW 0.1, YELLOW 0.15, YELLOW 0.25    4  YELLOW (1:10)  YELLOW 0.35, YELLOW 0.5    5  RED (1:1)  RED 0.05, RED 0.1    6  RED (1:1)  RED 0.15, RED 0.2    7  RED (1:1)  RED 0.3, RED 0.4    8  RED (1:1)  RED 0.5        VISIT 5    Time Injection Given: 821  Red 1:1   Dog, Grass, Dust Mite, Weeds 0.05 mL  Red 1:1   Trees     0.05 mL  Red 1:1   Cat, Molds    0.05 mL      Time Injection Given: 856  Red 1:1   Dog, Grass, Dust Mite, Weeds 0.1 mL  Red 1:1   Trees     0.1 mL  Red 1:1   Cat, Molds    0.1  mL        Start Time: 820   End Time:927      VITALS   Time BP Pulse pOx Reaction Treatment   853 114/77 46 96 none n/a   926 109/79 56 96 none n/a                                       Sade SMITH RN Specialty Triage 8/5/2021 9:30 AM        Again, thank you for allowing me to participate in the care of your patient.        Sincerely,        Shavonne Torres MD

## 2021-08-05 NOTE — PROGRESS NOTES
Chago Soni was seen in the Allergy Clinic at [facility].      Chago Soni is a 26 year old Choose not to answer male who is seen today for cluster immunotherapy. He had no adverse reactions after his last visit. He has been feeling well with no recent symptoms of fever or illness. Ross pre-medicated with cetirizine as directed prior to today's visit.    History reviewed. No pertinent past medical history.  Family History   Problem Relation Age of Onset     Allergies Mother      Allergies Father      Social History     Tobacco Use     Smoking status: Never Smoker     Smokeless tobacco: Never Used   Substance Use Topics     Alcohol use: Yes     Drug use: Never     Social History     Social History Narrative     Not on file       Past medical, family, and social history were reviewed.    REVIEW OF SYSTEMS:  General: negative for weight gain. negative for weight loss. negative for changes in sleep.   Eyes: negative for itching. negative for redness. negative for tearing/watering. negative for vision changes  Ears: negative for fullness. negative for hearing loss. negative for dizziness.   Nose: negative for snoring.negative for changes in smell. negative for drainage.   Throat: negative for hoarseness. negative for sore throat. negative for trouble swallowing.   Lungs: negative for cough. negative for shortness of breath.negative for wheezing. negative for sputum production.   Cardiovascular: negative for chest pain. negative for swelling of ankles. negative for fast or irregular heartbeat.   Gastrointestinal: negative for nausea. negative for heartburn. negative for acid reflux.   Musculoskeletal: negative for joint pain. negative for joint stiffness. negative for joint swelling.   Neurologic: negative for seizures. negative for fainting. negative for weakness.   Psychiatric: negative for changes in mood. negative for anxiety.   Endocrine: negative for cold intolerance. negative for heat  intolerance. negative for tremors.   Hematologic: negative for easy bruising. negative for easy bleeding.  Integumentary: negative for rash. negative for scaling. negative for nail changes.       Current Outpatient Medications:      cetirizine (ZYRTEC) 10 MG tablet, Take 10 mg by mouth daily, Disp: , Rfl:      EPINEPHrine (AUVI-Q) 0.3 MG/0.3ML injection 2-pack, Inject 0.3 mLs (0.3 mg) into the muscle as needed for anaphylaxis, Disp: 2 each, Rfl: 2     erythromycin (ROMYCIN) 5 MG/GM ophthalmic ointment, as needed, Disp: , Rfl:      famotidine (PEPCID) 20 MG tablet, Take 1 tablet (20 mg) by mouth 2 times daily, Disp: 60 tablet, Rfl: 1     fluticasone (FLONASE) 50 MCG/ACT nasal spray, Spray 1 spray into both nostrils daily, Disp: 16 g, Rfl: 1     metroNIDAZOLE (METROGEL) 0.75 % external gel, Apply topically 2 times daily, Disp: 45 g, Rfl: 1     mineral oil-hydrophilic petrolatum (AQUAPHOR) external ointment, Apply topically as needed, Disp: , Rfl:      mometasone (ELOCON) 0.1 % external cream, Apply topically daily To hands, Disp: 45 g, Rfl: 1     montelukast (SINGULAIR) 10 MG tablet, Take 1 tablet (10 mg) by mouth At Bedtime, Disp: 90 tablet, Rfl: 1     mupirocin (BACTROBAN) 2 % external cream, Apply topically 3 times daily To nose, Disp: 30 g, Rfl: 1     ORDER FOR ALLERGEN IMMUNOTHERAPY, Name of Mix: Mix #1  Mold, Cat Cat Hair, Standardized 10,000 BAU/mL, ALK  2.0 ml  Alternaria Tenuis 1:10 w/v, HS  0.5 ml Epicoccum Nigrum 1:10 w/v, HS 0.5 ml Phoma Herbarum 1:10 w/v, HS  0.5 ml Diluent: HSA qs to 5ml, Disp: 5 mL, Rfl: PRN     ORDER FOR ALLERGEN IMMUNOTHERAPY, Name of Mix: Mix #2  Dust Mite, Dog, Grass, Weeds Dog Hair-Dander, A. P.  1:100 w/v, HS  1.0 ml Dust Mites DP. 10,000 AU/mL, HS  1.0 ml  Speedy Grass (Std) 100,000 BAU/mL, HS 0.4 ml Pigweed, Careless 1:20 w/v, HS 0.5 ml Ragweed Mixed 1:20 w/v ALK  0.5 ml Sorrel, Sheep 1:20 w/v, HS 0.5 ml Diluent: HSA qs to 5ml, Disp: 5 mL, Rfl: PRN     ORDER FOR ALLERGEN  IMMUNOTHERAPY, Name of Mix: Mix #3  Tree  Birch Mix PRW 1:20 w/v, HS  0.5 ml Boxelder-Maple Mix BHR (Boxelder Hard Red) 1:20 w/v, HS  0.5 ml Elm, American 1:20 w/v, HS  0.5 ml Hackberry 1:20 w/v, HS 0.5 ml Hickory, Shagbark 1:20 w/v, HS  0.5 ml [place] Mix RW 1:20 w/v, HS 0.5 ml Oak Mix RVW 1:20 w/v, HS 0.5 ml [place] Tree, Black 1:20 w/v, HS 0.5 ml [place], Black 1:20 w/v, HS 0.5 ml Diluent: HSA qs to 5ml, Disp: 5 mL, Rfl: PRN     tacrolimus (PROTOPIC) 0.1 % external ointment, Apply once daily to rash on the nose and eyelids, Disp: 30 g, Rfl: 11     EPINEPHrine (ANY BX GENERIC EQUIV) 0.3 MG/0.3ML injection 2-pack, Inject 0.3 mLs (0.3 mg) into the muscle as needed for anaphylaxis (Patient not taking: Reported on 8/5/2021), Disp: 0.6 mL, Rfl: 2  Allergies   Allergen Reactions     Doxycycline      Eye swelling      Eggs [Chicken-Derived Products (Egg)]        EXAM:   /73   Pulse 60   Wt 92.1 kg (203 lb)   SpO2 99%   BMI 28.31 kg/m    GENERAL APPEARANCE: alert, cooperative and not in distress  SKIN: no rashes, no lesions  HEAD: atraumatic, normocephalic  EYES: lids and lashes normal, conjunctivae and sclerae clear, EOM full and intact  ENT: no scars or lesions, nasal exam showed no discharge, swelling or lesions noted, tongue midline and normal, soft palate, uvula, and tonsils normal  NECK: no asymmetry, masses, or scars, supple without significant adenopathy  LUNGS: unlabored respirations, no intercostal retractions or accessory muscle use, clear to auscultation without rales or wheezes  HEART: regular rate and rhythm without murmurs and normal S1 and S2  MUSCULOSKELETAL: no musculoskeletal defects are noted  NEURO: no focal deficits noted  PSYCH: does not appear depressed or anxious      WORKUP:  Cluster Immunotherapy    See nursing note for full flowsheet/documentation    ASSESSMENT/PLAN:  Chago Soni is a 26 year old male here for cluster immunotherapy.    1. Seasonal allergic rhinitis due to pollen -  Ross tolerated today's procedure well without developing any signs or symptoms of an adverse reaction.     - return in 7-14 days to continue cluster protocol  - continue pre-medication with antihistamines as directed  - RAPID DESENSITIZATION    2. Allergic rhinitis due to animals    - RAPID DESENSITIZATION    3. Allergic rhinitis due to dust mite    - RAPID DESENSITIZATION    4. Allergic conjunctivitis, bilateral    - RAPID DESENSITIZATION      Thank you for allowing me to participate in the care of Chago Soni.      Shavonne Torres MD, FAAAAI  Allergy/Immunology  [facility]      Chart documentation done in part with Dragon Voice Recognition Software. Although reviewed after completion, some word and grammatical errors may remain.

## 2021-08-05 NOTE — PROGRESS NOTES
Cluster Allergen Immunotherapy:    After explaining risks and benefits, and obtaining verbal and written consent, we proceeded with cluster immunotherapy.     VISIT  VIAL COLOR/STRENGTH  DOSES TO BE GIVEN    1  GREEN (1:1000), BLUE (1:100)  GREEN 0.1, GREEN 0.2, GREEN 0.4, BLUE 0.1    2  BLUE (1:100), YELLOW (1:10)  BLUE 0.2, BLUE 0.4, YELLOW 0.05    3  YELLOW (1:10)  YELLOW 0.1, YELLOW 0.15, YELLOW 0.25    4  YELLOW (1:10)  YELLOW 0.35, YELLOW 0.5    5  RED (1:1)  RED 0.05, RED 0.1    6  RED (1:1)  RED 0.15, RED 0.2    7  RED (1:1)  RED 0.3, RED 0.4    8  RED (1:1)  RED 0.5        VISIT 5    Time Injection Given: 821  Red 1:1   Dog, Grass, Dust Mite, Weeds 0.05 mL  Red 1:1   Trees     0.05 mL  Red 1:1   Cat, Molds    0.05 mL      Time Injection Given: 856  Red 1:1   Dog, Grass, Dust Mite, Weeds 0.1 mL  Red 1:1   Trees     0.1 mL  Red 1:1   Cat, Molds    0.1 mL        Start Time: 820   End Time:927      VITALS   Time BP Pulse pOx Reaction Treatment   853 114/77 46 96 none n/a   926 109/79 56 96 none n/a                                       Sade SMITH RN Specialty Triage 8/5/2021 9:30 AM

## 2021-08-19 ENCOUNTER — OFFICE VISIT (OUTPATIENT)
Dept: ALLERGY | Facility: CLINIC | Age: 26
End: 2021-08-19
Payer: OTHER GOVERNMENT

## 2021-08-19 VITALS
WEIGHT: 203 LBS | SYSTOLIC BLOOD PRESSURE: 117 MMHG | HEART RATE: 45 BPM | DIASTOLIC BLOOD PRESSURE: 77 MMHG | BODY MASS INDEX: 28.31 KG/M2 | OXYGEN SATURATION: 97 %

## 2021-08-19 DIAGNOSIS — H10.13 ALLERGIC CONJUNCTIVITIS, BILATERAL: ICD-10-CM

## 2021-08-19 DIAGNOSIS — J30.1 SEASONAL ALLERGIC RHINITIS DUE TO POLLEN: Primary | ICD-10-CM

## 2021-08-19 DIAGNOSIS — J30.89 ALLERGIC RHINITIS DUE TO DUST MITE: ICD-10-CM

## 2021-08-19 DIAGNOSIS — J30.81 ALLERGIC RHINITIS DUE TO ANIMALS: ICD-10-CM

## 2021-08-19 PROCEDURE — 95180 RAPID DESENSITIZATION: CPT | Performed by: ALLERGY & IMMUNOLOGY

## 2021-08-19 PROCEDURE — 99207 PR DROP WITH A PROCEDURE: CPT | Performed by: ALLERGY & IMMUNOLOGY

## 2021-08-19 NOTE — PROGRESS NOTES
Chago Soni was seen in the Allergy Clinic at [facility].      Chago Soni is a 26 year old Choose not to answer male who is seen today for cluster immunotherapy. He had no adverse reactions after his last visit. He has been feeling well with no recent symptoms of fever or illness. Ross pre-medicated with cetirizine as directed prior to today's visit.      History reviewed. No pertinent past medical history.  Family History   Problem Relation Age of Onset     Allergies Mother      Allergies Father      Social History     Tobacco Use     Smoking status: Never Smoker     Smokeless tobacco: Never Used   Substance Use Topics     Alcohol use: Yes     Drug use: Never     Social History     Social History Narrative     Not on file       Past medical, family, and social history were reviewed.    REVIEW OF SYSTEMS:  General: negative for weight gain. negative for weight loss. negative for changes in sleep.   Eyes: negative for itching. negative for redness. negative for tearing/watering. negative for vision changes  Ears: negative for fullness. negative for hearing loss. negative for dizziness.   Nose: negative for snoring.negative for changes in smell. negative for drainage.   Throat: negative for hoarseness. negative for sore throat. negative for trouble swallowing.   Lungs: negative for cough. negative for shortness of breath.negative for wheezing. negative for sputum production.   Cardiovascular: negative for chest pain. negative for swelling of ankles. negative for fast or irregular heartbeat.   Gastrointestinal: negative for nausea. negative for heartburn. negative for acid reflux.   Musculoskeletal: negative for joint pain. negative for joint stiffness. negative for joint swelling.   Neurologic: negative for seizures. negative for fainting. negative for weakness.   Psychiatric: negative for changes in mood. negative for anxiety.   Endocrine: negative for cold intolerance. negative for heat  intolerance. negative for tremors.   Hematologic: negative for easy bruising. negative for easy bleeding.  Integumentary: negative for rash. negative for scaling. negative for nail changes.       Current Outpatient Medications:      cetirizine (ZYRTEC) 10 MG tablet, Take 10 mg by mouth daily, Disp: , Rfl:      EPINEPHrine (AUVI-Q) 0.3 MG/0.3ML injection 2-pack, Inject 0.3 mLs (0.3 mg) into the muscle as needed for anaphylaxis, Disp: 2 each, Rfl: 2     erythromycin (ROMYCIN) 5 MG/GM ophthalmic ointment, as needed, Disp: , Rfl:      famotidine (PEPCID) 20 MG tablet, Take 1 tablet (20 mg) by mouth 2 times daily, Disp: 60 tablet, Rfl: 1     fluticasone (FLONASE) 50 MCG/ACT nasal spray, Spray 1 spray into both nostrils daily, Disp: 16 g, Rfl: 1     metroNIDAZOLE (METROGEL) 0.75 % external gel, Apply topically 2 times daily, Disp: 45 g, Rfl: 1     mineral oil-hydrophilic petrolatum (AQUAPHOR) external ointment, Apply topically as needed, Disp: , Rfl:      mometasone (ELOCON) 0.1 % external cream, Apply topically daily To hands, Disp: 45 g, Rfl: 1     montelukast (SINGULAIR) 10 MG tablet, Take 1 tablet (10 mg) by mouth At Bedtime, Disp: 90 tablet, Rfl: 1     mupirocin (BACTROBAN) 2 % external cream, Apply topically 3 times daily To nose, Disp: 30 g, Rfl: 1     ORDER FOR ALLERGEN IMMUNOTHERAPY, Name of Mix: Mix #1  Mold, Cat Cat Hair, Standardized 10,000 BAU/mL, ALK  2.0 ml  Alternaria Tenuis 1:10 w/v, HS  0.5 ml Epicoccum Nigrum 1:10 w/v, HS 0.5 ml Phoma Herbarum 1:10 w/v, HS  0.5 ml Diluent: HSA qs to 5ml, Disp: 5 mL, Rfl: PRN     ORDER FOR ALLERGEN IMMUNOTHERAPY, Name of Mix: Mix #2  Dust Mite, Dog, Grass, Weeds Dog Hair-Dander, A. P.  1:100 w/v, HS  1.0 ml Dust Mites DP. 10,000 AU/mL, HS  1.0 ml  Speedy Grass (Std) 100,000 BAU/mL, HS 0.4 ml Pigweed, Careless 1:20 w/v, HS 0.5 ml Ragweed Mixed 1:20 w/v ALK  0.5 ml Sorrel, Sheep 1:20 w/v, HS 0.5 ml Diluent: HSA qs to 5ml, Disp: 5 mL, Rfl: PRN     ORDER FOR ALLERGEN  IMMUNOTHERAPY, Name of Mix: Mix #3  Tree  Birch Mix PRW 1:20 w/v, HS  0.5 ml Boxelder-Maple Mix BHR (Boxelder Hard Red) 1:20 w/v, HS  0.5 ml Elm, American 1:20 w/v, HS  0.5 ml Hackberry 1:20 w/v, HS 0.5 ml Hickory, Shagbark 1:20 w/v, HS  0.5 ml [place] Mix RW 1:20 w/v, HS 0.5 ml Oak Mix RVW 1:20 w/v, HS 0.5 ml [place] Tree, Black 1:20 w/v, HS 0.5 ml [place], Black 1:20 w/v, HS 0.5 ml Diluent: HSA qs to 5ml, Disp: 5 mL, Rfl: PRN     tacrolimus (PROTOPIC) 0.1 % external ointment, Apply once daily to rash on the nose and eyelids, Disp: 30 g, Rfl: 11     EPINEPHrine (ANY BX GENERIC EQUIV) 0.3 MG/0.3ML injection 2-pack, Inject 0.3 mLs (0.3 mg) into the muscle as needed for anaphylaxis (Patient not taking: Reported on 8/5/2021), Disp: 0.6 mL, Rfl: 2  Allergies   Allergen Reactions     Doxycycline      Eye swelling      Eggs [Chicken-Derived Products (Egg)]        EXAM:   /78 (Cuff Size: Adult Large)   Pulse (!) 48   Wt 92.1 kg (203 lb)   SpO2 97%   BMI 28.31 kg/m    GENERAL APPEARANCE: alert, cooperative and not in distress  SKIN: no rashes, no lesions  HEAD: atraumatic, normocephalic  EYES: lids and lashes normal, conjunctivae and sclerae clear, EOM full and intact  ENT: no scars or lesions, nasal exam showed no discharge, swelling or lesions noted, tongue midline and normal, soft palate, uvula, and tonsils normal  NECK: no asymmetry, masses, or scars, supple without significant adenopathy  LUNGS: unlabored respirations, no intercostal retractions or accessory muscle use, clear to auscultation without rales or wheezes  HEART: regular rate and rhythm without murmurs and normal S1 and S2  MUSCULOSKELETAL: no musculoskeletal defects are noted  NEURO: no focal deficits noted  PSYCH: does not appear depressed or anxious      WORKUP:  Cluster Immunotherapy    Cluster Allergen Immunotherapy:    After explaining risks and benefits, and obtaining verbal and written consent, we proceeded with cluster immunotherapy.      VISIT  VIAL COLOR/STRENGTH  DOSES TO BE GIVEN    1  GREEN (1:1000), BLUE (1:100)  GREEN 0.1, GREEN 0.2, GREEN 0.4, BLUE 0.1    2  BLUE (1:100), YELLOW (1:10)  BLUE 0.2, BLUE 0.4, YELLOW 0.05    3  YELLOW (1:10)  YELLOW 0.1, YELLOW 0.15, YELLOW 0.25    4  YELLOW (1:10)  YELLOW 0.35, YELLOW 0.5    5  RED (1:1)  RED 0.05, RED 0.1    6  RED (1:1)  RED 0.15, RED 0.2    7  RED (1:1)  RED 0.3, RED 0.4    8  RED (1:1)  RED 0.5        VISIT 6    Time Injection Given: 0832  Red 1:1   Trees      0.15mL  Red 1:1   Dog, Grass, Dust Mite, Weeds  0.15mL  Red 1:1   Cat, Molds     0.15mL    Time Injection Given: 0905  Red 1:1   Trees      0.2 mL  Red 1:1   Dog, Grass, Dust Mite, Weeds  0.2 mL  Red 1:1   Cat, Molds     0.2 mL        Start Time: 0832  End Time: 09:37      VITALS   Time BP Pulse pOx Reaction Treatment   0904 116/78 48 97% none N/A   0937 117/77 45 97% none N/A       ASSESSMENT/PLAN:  Chago Soni is a 26 year old male here for cluster immunotherapy    1. Seasonal allergic rhinitis due to pollen - Ross tolerated today's procedure well without developing any signs or symptoms of an adverse reaction.     - return in 7-14 days to continue cluster protocol  - continue pre-medication with antihistamines as directed  - RAPID DESENSITIZATION    2. Allergic rhinitis due to animals    - RAPID DESENSITIZATION    3. Allergic rhinitis due to dust mite    - RAPID DESENSITIZATION    4. Allergic conjunctivitis, bilateral    - RAPID DESENSITIZATION      Thank you for allowing me to participate in the care of Chago Soni.      Shavonne Torres MD, FAAAAI  Allergy/Immunology  [facility]      Chart documentation done in part with Dragon Voice Recognition Software. Although reviewed after completion, some word and grammatical errors may remain.

## 2021-08-19 NOTE — LETTER
8/19/2021         RE: Chago Soni  [address]  [place] [facility] [place] [address]        Dear Colleague,    Thank you for referring your patient, Chago Soni, to the [facility]. Please see a copy of my visit note below.    Chago Soni was seen in the Allergy Clinic at [facility].      Chago Soni is a 26 year old Choose not to answer male who is seen today for cluster immunotherapy. He had no adverse reactions after his last visit. He has been feeling well with no recent symptoms of fever or illness. Ross pre-medicated with cetirizine as directed prior to today's visit.      History reviewed. No pertinent past medical history.  Family History   Problem Relation Age of Onset     Allergies Mother      Allergies Father      Social History     Tobacco Use     Smoking status: Never Smoker     Smokeless tobacco: Never Used   Substance Use Topics     Alcohol use: Yes     Drug use: Never     Social History     Social History Narrative     Not on file       Past medical, family, and social history were reviewed.    REVIEW OF SYSTEMS:  General: negative for weight gain. negative for weight loss. negative for changes in sleep.   Eyes: negative for itching. negative for redness. negative for tearing/watering. negative for vision changes  Ears: negative for fullness. negative for hearing loss. negative for dizziness.   Nose: negative for snoring.negative for changes in smell. negative for drainage.   Throat: negative for hoarseness. negative for sore throat. negative for trouble swallowing.   Lungs: negative for cough. negative for shortness of breath.negative for wheezing. negative for sputum production.   Cardiovascular: negative for chest pain. negative for swelling of ankles. negative for fast or irregular heartbeat.   Gastrointestinal: negative for nausea. negative for heartburn. negative for acid reflux.   Musculoskeletal: negative for joint pain. negative for joint  stiffness. negative for joint swelling.   Neurologic: negative for seizures. negative for fainting. negative for weakness.   Psychiatric: negative for changes in mood. negative for anxiety.   Endocrine: negative for cold intolerance. negative for heat intolerance. negative for tremors.   Hematologic: negative for easy bruising. negative for easy bleeding.  Integumentary: negative for rash. negative for scaling. negative for nail changes.       Current Outpatient Medications:      cetirizine (ZYRTEC) 10 MG tablet, Take 10 mg by mouth daily, Disp: , Rfl:      EPINEPHrine (AUVI-Q) 0.3 MG/0.3ML injection 2-pack, Inject 0.3 mLs (0.3 mg) into the muscle as needed for anaphylaxis, Disp: 2 each, Rfl: 2     erythromycin (ROMYCIN) 5 MG/GM ophthalmic ointment, as needed, Disp: , Rfl:      famotidine (PEPCID) 20 MG tablet, Take 1 tablet (20 mg) by mouth 2 times daily, Disp: 60 tablet, Rfl: 1     fluticasone (FLONASE) 50 MCG/ACT nasal spray, Spray 1 spray into both nostrils daily, Disp: 16 g, Rfl: 1     metroNIDAZOLE (METROGEL) 0.75 % external gel, Apply topically 2 times daily, Disp: 45 g, Rfl: 1     mineral oil-hydrophilic petrolatum (AQUAPHOR) external ointment, Apply topically as needed, Disp: , Rfl:      mometasone (ELOCON) 0.1 % external cream, Apply topically daily To hands, Disp: 45 g, Rfl: 1     montelukast (SINGULAIR) 10 MG tablet, Take 1 tablet (10 mg) by mouth At Bedtime, Disp: 90 tablet, Rfl: 1     mupirocin (BACTROBAN) 2 % external cream, Apply topically 3 times daily To nose, Disp: 30 g, Rfl: 1     ORDER FOR ALLERGEN IMMUNOTHERAPY, Name of Mix: Mix #1  Mold, Cat Cat Hair, Standardized 10,000 BAU/mL, ALK  2.0 ml  Alternaria Tenuis 1:10 w/v, HS  0.5 ml Epicoccum Nigrum 1:10 w/v, HS 0.5 ml Phoma Herbarum 1:10 w/v, HS  0.5 ml Diluent: HSA qs to 5ml, Disp: 5 mL, Rfl: PRN     ORDER FOR ALLERGEN IMMUNOTHERAPY, Name of Mix: Mix #2  Dust Mite, Dog, Grass, Weeds Dog Hair-Dander, A. P.  1:100 w/v, HS  1.0 ml Dust Mites DP.  10,000 AU/mL, HS  1.0 ml  Speedy Grass (Std) 100,000 BAU/mL, HS 0.4 ml Pigweed, Careless 1:20 w/v, HS 0.5 ml Ragweed Mixed 1:20 w/v ALK  0.5 ml Sorrel, Sheep 1:20 w/v, HS 0.5 ml Diluent: HSA qs to 5ml, Disp: 5 mL, Rfl: PRN     ORDER FOR ALLERGEN IMMUNOTHERAPY, Name of Mix: Mix #3  Tree  Birch Mix PRW 1:20 w/v, HS  0.5 ml Boxelder-Maple Mix BHR (Boxelder Hard Red) 1:20 w/v, HS  0.5 ml Elm, American 1:20 w/v, HS  0.5 ml Hackberry 1:20 w/v, HS 0.5 ml Hickory, Shagbark 1:20 w/v, HS  0.5 ml [place] Mix RW 1:20 w/v, HS 0.5 ml Oak Mix RVW 1:20 w/v, HS 0.5 ml [place] Tree, Black 1:20 w/v, HS 0.5 ml [place], Black 1:20 w/v, HS 0.5 ml Diluent: HSA qs to 5ml, Disp: 5 mL, Rfl: PRN     tacrolimus (PROTOPIC) 0.1 % external ointment, Apply once daily to rash on the nose and eyelids, Disp: 30 g, Rfl: 11     EPINEPHrine (ANY BX GENERIC EQUIV) 0.3 MG/0.3ML injection 2-pack, Inject 0.3 mLs (0.3 mg) into the muscle as needed for anaphylaxis (Patient not taking: Reported on 8/5/2021), Disp: 0.6 mL, Rfl: 2  Allergies   Allergen Reactions     Doxycycline      Eye swelling      Eggs [Chicken-Derived Products (Egg)]        EXAM:   /78 (Cuff Size: Adult Large)   Pulse (!) 48   Wt 92.1 kg (203 lb)   SpO2 97%   BMI 28.31 kg/m    GENERAL APPEARANCE: alert, cooperative and not in distress  SKIN: no rashes, no lesions  HEAD: atraumatic, normocephalic  EYES: lids and lashes normal, conjunctivae and sclerae clear, EOM full and intact  ENT: no scars or lesions, nasal exam showed no discharge, swelling or lesions noted, tongue midline and normal, soft palate, uvula, and tonsils normal  NECK: no asymmetry, masses, or scars, supple without significant adenopathy  LUNGS: unlabored respirations, no intercostal retractions or accessory muscle use, clear to auscultation without rales or wheezes  HEART: regular rate and rhythm without murmurs and normal S1 and S2  MUSCULOSKELETAL: no musculoskeletal defects are noted  NEURO: no focal deficits  noted  PSYCH: does not appear depressed or anxious      WORKUP:  Cluster Immunotherapy    Cluster Allergen Immunotherapy:    After explaining risks and benefits, and obtaining verbal and written consent, we proceeded with cluster immunotherapy.     VISIT  VIAL COLOR/STRENGTH  DOSES TO BE GIVEN    1  GREEN (1:1000), BLUE (1:100)  GREEN 0.1, GREEN 0.2, GREEN 0.4, BLUE 0.1    2  BLUE (1:100), YELLOW (1:10)  BLUE 0.2, BLUE 0.4, YELLOW 0.05    3  YELLOW (1:10)  YELLOW 0.1, YELLOW 0.15, YELLOW 0.25    4  YELLOW (1:10)  YELLOW 0.35, YELLOW 0.5    5  RED (1:1)  RED 0.05, RED 0.1    6  RED (1:1)  RED 0.15, RED 0.2    7  RED (1:1)  RED 0.3, RED 0.4    8  RED (1:1)  RED 0.5        VISIT 6    Time Injection Given: 0832  Red 1:1   Trees      0.15mL  Red 1:1   Dog, Grass, Dust Mite, Weeds  0.15mL  Red 1:1   Cat, Molds     0.15mL    Time Injection Given: 0905  Red 1:1   Trees      0.2 mL  Red 1:1   Dog, Grass, Dust Mite, Weeds  0.2 mL  Red 1:1   Cat, Molds     0.2 mL        Start Time: 0832  End Time: 09:37      VITALS   Time BP Pulse pOx Reaction Treatment   0904 116/78 48 97% none N/A   0937 117/77 45 97% none N/A       ASSESSMENT/PLAN:  Chago Soni is a 26 year old male here for cluster immunotherapy    1. Seasonal allergic rhinitis due to pollen - Ross tolerated today's procedure well without developing any signs or symptoms of an adverse reaction.     - return in 7-14 days to continue cluster protocol  - continue pre-medication with antihistamines as directed  - RAPID DESENSITIZATION    2. Allergic rhinitis due to animals    - RAPID DESENSITIZATION    3. Allergic rhinitis due to dust mite    - RAPID DESENSITIZATION    4. Allergic conjunctivitis, bilateral    - RAPID DESENSITIZATION      Thank you for allowing me to participate in the care of Chago Soni.      Shavonne Torres MD, FAAAAI  Allergy/Immunology  [facility]      Chart documentation done  in part with Dragon Voice Recognition Software. Although reviewed after completion, some word and grammatical errors may remain.    Prior to initiation of cluster immunotherapy RN ensured that patient was feeling healthy, has premedicated with Zyrtec or Allegra yesterday twice daily and this morning. RN also ensured that patient has unexpired Epi-Pen, had no new medication changes, and did not have a reaction after the last allergy shots were given. If the patient has asthma it is well-controlled. The patient has not been ill in the past 7 days. Patient was given allergy injections per cluster immunotherapy protocol 30 minutes apart and vital signs were monitored. Patient was monitored in clinic for 30 minutes after last injection was given and assessed by provider before discharging.     Lucia GRAY RN          Again, thank you for allowing me to participate in the care of your patient.        Sincerely,        Shavonne Torres MD

## 2021-08-26 ENCOUNTER — OFFICE VISIT (OUTPATIENT)
Dept: ALLERGY | Facility: CLINIC | Age: 26
End: 2021-08-26
Payer: OTHER GOVERNMENT

## 2021-08-26 VITALS
BODY MASS INDEX: 27.73 KG/M2 | OXYGEN SATURATION: 98 % | SYSTOLIC BLOOD PRESSURE: 123 MMHG | DIASTOLIC BLOOD PRESSURE: 85 MMHG | WEIGHT: 198.8 LBS | HEART RATE: 52 BPM

## 2021-08-26 DIAGNOSIS — H10.13 ALLERGIC CONJUNCTIVITIS, BILATERAL: ICD-10-CM

## 2021-08-26 DIAGNOSIS — J30.1 SEASONAL ALLERGIC RHINITIS DUE TO POLLEN: Primary | ICD-10-CM

## 2021-08-26 DIAGNOSIS — J30.81 ALLERGIC RHINITIS DUE TO ANIMALS: ICD-10-CM

## 2021-08-26 DIAGNOSIS — J30.89 ALLERGIC RHINITIS DUE TO DUST MITE: ICD-10-CM

## 2021-08-26 PROCEDURE — 99207 PR DROP WITH A PROCEDURE: CPT | Performed by: ALLERGY & IMMUNOLOGY

## 2021-08-26 PROCEDURE — 95180 RAPID DESENSITIZATION: CPT | Performed by: ALLERGY & IMMUNOLOGY

## 2021-08-26 NOTE — PROGRESS NOTES
Chago Soni was seen in the Allergy Clinic at [facility].      Chago Soni is a 26 year old Choose not to answer male who is seen today for cluster immunotherapy. He had no adverse reactions after his last visit. He has been feeling well with no recent symptoms of fever or illness. Ross pre-medicated with cetirizine as directed prior to today's visit.    History reviewed. No pertinent past medical history.  Family History   Problem Relation Age of Onset     Allergies Mother      Allergies Father      Social History     Tobacco Use     Smoking status: Never Smoker     Smokeless tobacco: Never Used   Substance Use Topics     Alcohol use: Yes     Drug use: Never     Social History     Social History Narrative     Not on file       Past medical, family, and social history were reviewed.    REVIEW OF SYSTEMS:  General: negative for weight gain. negative for weight loss. negative for changes in sleep.   Eyes: negative for itching. negative for redness. negative for tearing/watering. negative for vision changes  Ears: negative for fullness. negative for hearing loss. negative for dizziness.   Nose: negative for snoring.negative for changes in smell. negative for drainage.   Throat: negative for hoarseness. negative for sore throat. negative for trouble swallowing.   Lungs: negative for cough. negative for shortness of breath.negative for wheezing. negative for sputum production.   Cardiovascular: negative for chest pain. negative for swelling of ankles. negative for fast or irregular heartbeat.   Gastrointestinal: negative for nausea. negative for heartburn. negative for acid reflux.   Musculoskeletal: negative for joint pain. negative for joint stiffness. negative for joint swelling.   Neurologic: negative for seizures. negative for fainting. negative for weakness.   Psychiatric: negative for changes in mood. negative for anxiety.   Endocrine: negative for cold intolerance. negative for heat  intolerance. negative for tremors.   Hematologic: negative for easy bruising. negative for easy bleeding.  Integumentary: negative for rash. negative for scaling. negative for nail changes.       Current Outpatient Medications:      cetirizine (ZYRTEC) 10 MG tablet, Take 10 mg by mouth daily, Disp: , Rfl:      EPINEPHrine (AUVI-Q) 0.3 MG/0.3ML injection 2-pack, Inject 0.3 mLs (0.3 mg) into the muscle as needed for anaphylaxis, Disp: 2 each, Rfl: 2     erythromycin (ROMYCIN) 5 MG/GM ophthalmic ointment, as needed, Disp: , Rfl:      famotidine (PEPCID) 20 MG tablet, Take 1 tablet (20 mg) by mouth 2 times daily, Disp: 60 tablet, Rfl: 1     fluticasone (FLONASE) 50 MCG/ACT nasal spray, Spray 1 spray into both nostrils daily, Disp: 16 g, Rfl: 1     metroNIDAZOLE (METROGEL) 0.75 % external gel, Apply topically 2 times daily, Disp: 45 g, Rfl: 1     mineral oil-hydrophilic petrolatum (AQUAPHOR) external ointment, Apply topically as needed, Disp: , Rfl:      mometasone (ELOCON) 0.1 % external cream, Apply topically daily To hands, Disp: 45 g, Rfl: 1     montelukast (SINGULAIR) 10 MG tablet, Take 1 tablet (10 mg) by mouth At Bedtime, Disp: 90 tablet, Rfl: 1     mupirocin (BACTROBAN) 2 % external cream, Apply topically 3 times daily To nose, Disp: 30 g, Rfl: 1     ORDER FOR ALLERGEN IMMUNOTHERAPY, Name of Mix: Mix #1  Mold, Cat Cat Hair, Standardized 10,000 BAU/mL, ALK  2.0 ml  Alternaria Tenuis 1:10 w/v, HS  0.5 ml Epicoccum Nigrum 1:10 w/v, HS 0.5 ml Phoma Herbarum 1:10 w/v, HS  0.5 ml Diluent: HSA qs to 5ml, Disp: 5 mL, Rfl: PRN     ORDER FOR ALLERGEN IMMUNOTHERAPY, Name of Mix: Mix #2  Dust Mite, Dog, Grass, Weeds Dog Hair-Dander, A. P.  1:100 w/v, HS  1.0 ml Dust Mites DP. 10,000 AU/mL, HS  1.0 ml  Speedy Grass (Std) 100,000 BAU/mL, HS 0.4 ml Pigweed, Careless 1:20 w/v, HS 0.5 ml Ragweed Mixed 1:20 w/v ALK  0.5 ml Sorrel, Sheep 1:20 w/v, HS 0.5 ml Diluent: HSA qs to 5ml, Disp: 5 mL, Rfl: PRN     ORDER FOR ALLERGEN  IMMUNOTHERAPY, Name of Mix: Mix #3  Tree  Birch Mix PRW 1:20 w/v, HS  0.5 ml Boxelder-Maple Mix BHR (Boxelder Hard Red) 1:20 w/v, HS  0.5 ml Elm, American 1:20 w/v, HS  0.5 ml Hackberry 1:20 w/v, HS 0.5 ml Hickory, Shagbark 1:20 w/v, HS  0.5 ml [place] Mix RW 1:20 w/v, HS 0.5 ml Oak Mix RVW 1:20 w/v, HS 0.5 ml [place] Tree, Black 1:20 w/v, HS 0.5 ml [place], Black 1:20 w/v, HS 0.5 ml Diluent: HSA qs to 5ml, Disp: 5 mL, Rfl: PRN     tacrolimus (PROTOPIC) 0.1 % external ointment, Apply once daily to rash on the nose and eyelids, Disp: 30 g, Rfl: 11     EPINEPHrine (ANY BX GENERIC EQUIV) 0.3 MG/0.3ML injection 2-pack, Inject 0.3 mLs (0.3 mg) into the muscle as needed for anaphylaxis (Patient not taking: Reported on 8/5/2021), Disp: 0.6 mL, Rfl: 2  Allergies   Allergen Reactions     Doxycycline      Eye swelling      Eggs [Chicken-Derived Products (Egg)]        EXAM:   /78   Pulse 50   Wt 90.2 kg (198 lb 12.8 oz)   SpO2 97%   BMI 27.73 kg/m    GENERAL APPEARANCE: alert, cooperative and not in distress  SKIN: no rashes, no lesions  HEAD: atraumatic, normocephalic  EYES: lids and lashes normal, conjunctivae and sclerae clear, EOM full and intact  ENT: no scars or lesions, nasal exam showed no discharge, swelling or lesions noted, tongue midline and normal, soft palate, uvula, and tonsils normal  NECK: no asymmetry, masses, or scars, supple without significant adenopathy  LUNGS: unlabored respirations, no intercostal retractions or accessory muscle use, clear to auscultation without rales or wheezes  HEART: regular rate and rhythm without murmurs and normal S1 and S2  MUSCULOSKELETAL: no musculoskeletal defects are noted  NEURO: no focal deficits noted  PSYCH: does not appear depressed or anxious      WORKUP:  Cluster Immunotherapy    Cluster Allergen Immunotherapy:    After explaining risks and benefits, and obtaining verbal and written consent, we proceeded with cluster immunotherapy.     VISIT  VIAL  COLOR/STRENGTH  DOSES TO BE GIVEN    1  GREEN (1:1000), BLUE (1:100)  GREEN 0.1, GREEN 0.2, GREEN 0.4, BLUE 0.1    2  BLUE (1:100), YELLOW (1:10)  BLUE 0.2, BLUE 0.4, YELLOW 0.05    3  YELLOW (1:10)  YELLOW 0.1, YELLOW 0.15, YELLOW 0.25    4  YELLOW (1:10)  YELLOW 0.35, YELLOW 0.5    5  RED (1:1)  RED 0.05, RED 0.1    6  RED (1:1)  RED 0.15, RED 0.2    7  RED (1:1)  RED 0.3, RED 0.4    8  RED (1:1)  RED 0.5        VISIT 7    Time Injection Given: 0810  Red 1:1   Trees       0.3 mL  Red 1:1   Dog, Grass, Dust Mite, Weeds   0.3 mL  Red 1:1   Cat, Molds      0.3 mL    Time Injection Given: 0848  Red 1:1   Trees       0.4 mL  Red 1:1   Dog, Grass, Dust Mite, Weeds   0.4 mL  Red 1:1   Cat, Molds      0.4 mL      Start Time: 0810  End Time: 0920      VITALS   Time BP Pulse pOx Reaction Treatment   0845 126/83 53 97% None  N/A   0920 123/85 52 98% none N/A       ASSESSMENT/PLAN:  Chago Soni is a 26 year old male here for cluster immunotherapy,    1. Seasonal allergic rhinitis due to pollen - Ross tolerated today's procedure well without developing any signs or symptoms of an adverse reaction.     - return in 7-14 days to continue cluster protocol  - continue pre-medication with antihistamines as directed  - RAPID DESENSITIZATION    2. Allergic rhinitis due to animals    - RAPID DESENSITIZATION    3. Allergic rhinitis due to dust mite    - RAPID DESENSITIZATION    4. Allergic conjunctivitis, bilateral    - RAPID DESENSITIZATION      Thank you for allowing me to participate in the care of Chago Soni.      Shavonne Torres MD, FAAAAI  Allergy/Immunology  [facility]      Chart documentation done in part with Dragon Voice Recognition Software. Although reviewed after completion, some word and grammatical errors may remain.

## 2021-08-26 NOTE — LETTER
8/26/2021         RE: Chago Soni  [address]  [facility] [address]        Dear Colleague,    Thank you for referring your patient, Chago Soni, to the [facility]. Please see a copy of my visit note below.    Chago Soni was seen in the Allergy Clinic at [facility].      Chago Soni is a 26 year old Choose not to answer male who is seen today for cluster immunotherapy. He had no adverse reactions after his last visit. He has been feeling well with no recent symptoms of fever or illness. Ross pre-medicated with cetirizine as directed prior to today's visit.    History reviewed. No pertinent past medical history.  Family History   Problem Relation Age of Onset     Allergies Mother      Allergies Father      Social History     Tobacco Use     Smoking status: Never Smoker     Smokeless tobacco: Never Used   Substance Use Topics     Alcohol use: Yes     Drug use: Never     Social History     Social History Narrative     Not on file       Past medical, family, and social history were reviewed.    REVIEW OF SYSTEMS:  General: negative for weight gain. negative for weight loss. negative for changes in sleep.   Eyes: negative for itching. negative for redness. negative for tearing/watering. negative for vision changes  Ears: negative for fullness. negative for hearing loss. negative for dizziness.   Nose: negative for snoring.negative for changes in smell. negative for drainage.   Throat: negative for hoarseness. negative for sore throat. negative for trouble swallowing.   Lungs: negative for cough. negative for shortness of breath.negative for wheezing. negative for sputum production.   Cardiovascular: negative for chest pain. negative for swelling of ankles. negative for fast or irregular heartbeat.   Gastrointestinal: negative for nausea. negative for heartburn. negative for acid reflux.   Musculoskeletal: negative for joint pain. negative for joint  stiffness. negative for joint swelling.   Neurologic: negative for seizures. negative for fainting. negative for weakness.   Psychiatric: negative for changes in mood. negative for anxiety.   Endocrine: negative for cold intolerance. negative for heat intolerance. negative for tremors.   Hematologic: negative for easy bruising. negative for easy bleeding.  Integumentary: negative for rash. negative for scaling. negative for nail changes.       Current Outpatient Medications:      cetirizine (ZYRTEC) 10 MG tablet, Take 10 mg by mouth daily, Disp: , Rfl:      EPINEPHrine (AUVI-Q) 0.3 MG/0.3ML injection 2-pack, Inject 0.3 mLs (0.3 mg) into the muscle as needed for anaphylaxis, Disp: 2 each, Rfl: 2     erythromycin (ROMYCIN) 5 MG/GM ophthalmic ointment, as needed, Disp: , Rfl:      famotidine (PEPCID) 20 MG tablet, Take 1 tablet (20 mg) by mouth 2 times daily, Disp: 60 tablet, Rfl: 1     fluticasone (FLONASE) 50 MCG/ACT nasal spray, Spray 1 spray into both nostrils daily, Disp: 16 g, Rfl: 1     metroNIDAZOLE (METROGEL) 0.75 % external gel, Apply topically 2 times daily, Disp: 45 g, Rfl: 1     mineral oil-hydrophilic petrolatum (AQUAPHOR) external ointment, Apply topically as needed, Disp: , Rfl:      mometasone (ELOCON) 0.1 % external cream, Apply topically daily To hands, Disp: 45 g, Rfl: 1     montelukast (SINGULAIR) 10 MG tablet, Take 1 tablet (10 mg) by mouth At Bedtime, Disp: 90 tablet, Rfl: 1     mupirocin (BACTROBAN) 2 % external cream, Apply topically 3 times daily To nose, Disp: 30 g, Rfl: 1     ORDER FOR ALLERGEN IMMUNOTHERAPY, Name of Mix: Mix #1  Mold, Cat Cat Hair, Standardized 10,000 BAU/mL, ALK  2.0 ml  Alternaria Tenuis 1:10 w/v, HS  0.5 ml Epicoccum Nigrum 1:10 w/v, HS 0.5 ml Phoma Herbarum 1:10 w/v, HS  0.5 ml Diluent: HSA qs to 5ml, Disp: 5 mL, Rfl: PRN     ORDER FOR ALLERGEN IMMUNOTHERAPY, Name of Mix: Mix #2  Dust Mite, Dog, Grass, Weeds Dog Hair-Dander, A. P.  1:100 w/v, HS  1.0 ml Dust Mites DP.  10,000 AU/mL, HS  1.0 ml  Speedy Grass (Std) 100,000 BAU/mL, HS 0.4 ml Pigweed, Careless 1:20 w/v, HS 0.5 ml Ragweed Mixed 1:20 w/v ALK  0.5 ml Sorrel, Sheep 1:20 w/v, HS 0.5 ml Diluent: HSA qs to 5ml, Disp: 5 mL, Rfl: PRN     ORDER FOR ALLERGEN IMMUNOTHERAPY, Name of Mix: Mix #3  Tree  Birch Mix PRW 1:20 w/v, HS  0.5 ml Boxelder-Maple Mix BHR (Boxelder Hard Red) 1:20 w/v, HS  0.5 ml Elm, American 1:20 w/v, HS  0.5 ml Hackberry 1:20 w/v, HS 0.5 ml Hickory, Shagbark 1:20 w/v, HS  0.5 ml [place] Mix RW 1:20 w/v, HS 0.5 ml Oak Mix RVW 1:20 w/v, HS 0.5 ml [place] Tree, Black 1:20 w/v, HS 0.5 ml [place], Black 1:20 w/v, HS 0.5 ml Diluent: HSA qs to 5ml, Disp: 5 mL, Rfl: PRN     tacrolimus (PROTOPIC) 0.1 % external ointment, Apply once daily to rash on the nose and eyelids, Disp: 30 g, Rfl: 11     EPINEPHrine (ANY BX GENERIC EQUIV) 0.3 MG/0.3ML injection 2-pack, Inject 0.3 mLs (0.3 mg) into the muscle as needed for anaphylaxis (Patient not taking: Reported on 8/5/2021), Disp: 0.6 mL, Rfl: 2  Allergies   Allergen Reactions     Doxycycline      Eye swelling      Eggs [Chicken-Derived Products (Egg)]        EXAM:   /78   Pulse 50   Wt 90.2 kg (198 lb 12.8 oz)   SpO2 97%   BMI 27.73 kg/m    GENERAL APPEARANCE: alert, cooperative and not in distress  SKIN: no rashes, no lesions  HEAD: atraumatic, normocephalic  EYES: lids and lashes normal, conjunctivae and sclerae clear, EOM full and intact  ENT: no scars or lesions, nasal exam showed no discharge, swelling or lesions noted, tongue midline and normal, soft palate, uvula, and tonsils normal  NECK: no asymmetry, masses, or scars, supple without significant adenopathy  LUNGS: unlabored respirations, no intercostal retractions or accessory muscle use, clear to auscultation without rales or wheezes  HEART: regular rate and rhythm without murmurs and normal S1 and S2  MUSCULOSKELETAL: no musculoskeletal defects are noted  NEURO: no focal deficits noted  PSYCH: does not  appear depressed or anxious      WORKUP:  Cluster Immunotherapy    Cluster Allergen Immunotherapy:    After explaining risks and benefits, and obtaining verbal and written consent, we proceeded with cluster immunotherapy.     VISIT  VIAL COLOR/STRENGTH  DOSES TO BE GIVEN    1  GREEN (1:1000), BLUE (1:100)  GREEN 0.1, GREEN 0.2, GREEN 0.4, BLUE 0.1    2  BLUE (1:100), YELLOW (1:10)  BLUE 0.2, BLUE 0.4, YELLOW 0.05    3  YELLOW (1:10)  YELLOW 0.1, YELLOW 0.15, YELLOW 0.25    4  YELLOW (1:10)  YELLOW 0.35, YELLOW 0.5    5  RED (1:1)  RED 0.05, RED 0.1    6  RED (1:1)  RED 0.15, RED 0.2    7  RED (1:1)  RED 0.3, RED 0.4    8  RED (1:1)  RED 0.5        VISIT 7    Time Injection Given: 0810  Red 1:1   Trees       0.3 mL  Red 1:1   Dog, Grass, Dust Mite, Weeds   0.3 mL  Red 1:1   Cat, Molds      0.3 mL    Time Injection Given: 0848  Red 1:1   Trees       0.4 mL  Red 1:1   Dog, Grass, Dust Mite, Weeds   0.4 mL  Red 1:1   Cat, Molds      0.4 mL      Start Time: 0810  End Time: 0920      VITALS   Time BP Pulse pOx Reaction Treatment   0845 126/83 53 97% None  N/A   0920 123/85 52 98% none N/A       ASSESSMENT/PLAN:  Chago Soni is a 26 year old male here for cluster immunotherapy,    1. Seasonal allergic rhinitis due to pollen - Ross tolerated today's procedure well without developing any signs or symptoms of an adverse reaction.     - return in 7-14 days to continue cluster protocol  - continue pre-medication with antihistamines as directed  - RAPID DESENSITIZATION    2. Allergic rhinitis due to animals    - RAPID DESENSITIZATION    3. Allergic rhinitis due to dust mite    - RAPID DESENSITIZATION    4. Allergic conjunctivitis, bilateral    - RAPID DESENSITIZATION      Thank you for allowing me to participate in the care of Chago Soni.      Shavonne Torres MD, FAAAAI  Allergy/Immunology  [facility]      Chart documentation done in part with  [employer]ICE Voice Recognition Software. Although reviewed after completion, some word and grammatical errors may remain.    Prior to initiation of cluster immunotherapy RN ensured that patient was feeling healthy, has premedicated with Zyrtec or Allegra yesterday twice daily and this morning. RN also ensured that patient has unexpired Epi-Pen, had no new medication changes, and did not have a reaction after the last allergy shots were given. If the patient has asthma it is well-controlled. The patient has not been ill in the past 7 days. Patient was given allergy injections per cluster immunotherapy protocol 30 minutes apart and vital signs were monitored. Patient was monitored in clinic for 30 minutes after last injection was given and assessed by provider before discharging.     Lucia GRAY RN        Again, thank you for allowing me to participate in the care of your patient.        Sincerely,        Shavonne Torres MD

## 2021-08-30 ENCOUNTER — ALLIED HEALTH/NURSE VISIT (OUTPATIENT)
Dept: ALLERGY | Facility: CLINIC | Age: 26
End: 2021-08-30
Payer: OTHER GOVERNMENT

## 2021-08-30 DIAGNOSIS — J30.81 ALLERGIC RHINITIS DUE TO ANIMALS: ICD-10-CM

## 2021-08-30 DIAGNOSIS — J30.1 SEASONAL ALLERGIC RHINITIS DUE TO POLLEN: Primary | ICD-10-CM

## 2021-08-30 DIAGNOSIS — J30.89 ALLERGIC RHINITIS DUE TO DUST MITE: ICD-10-CM

## 2021-08-30 PROCEDURE — 95117 IMMUNOTHERAPY INJECTIONS: CPT

## 2021-08-30 NOTE — PROGRESS NOTES
Patient presented after waiting 30 minutes with no reaction to allergy injections. Discharged from clinic.    Murali STARKEY RN ............   8/30/2021...11:36 AM

## 2021-09-09 ENCOUNTER — OFFICE VISIT (OUTPATIENT)
Dept: ALLERGY | Facility: CLINIC | Age: 26
End: 2021-09-09
Payer: OTHER GOVERNMENT

## 2021-09-09 VITALS — OXYGEN SATURATION: 98 % | HEART RATE: 52 BPM | SYSTOLIC BLOOD PRESSURE: 123 MMHG | DIASTOLIC BLOOD PRESSURE: 74 MMHG

## 2021-09-09 DIAGNOSIS — Z91.012 EGG ALLERGY: ICD-10-CM

## 2021-09-09 DIAGNOSIS — T78.1XXD ADVERSE REACTION TO FOOD, SUBSEQUENT ENCOUNTER: Primary | ICD-10-CM

## 2021-09-09 PROCEDURE — 99207 PR DROP WITH A PROCEDURE: CPT | Performed by: ALLERGY & IMMUNOLOGY

## 2021-09-09 PROCEDURE — 95079 INGEST CHALLENGE ADDL 60 MIN: CPT | Performed by: ALLERGY & IMMUNOLOGY

## 2021-09-09 PROCEDURE — 95076 INGEST CHALLENGE INI 120 MIN: CPT | Performed by: ALLERGY & IMMUNOLOGY

## 2021-09-09 NOTE — PROGRESS NOTES
Chago Soni was seen in the Allergy Clinic at [facility].      Chago Soni is a 26 year old Choose not to answer male who is seen today for an oral food challenge to egg. He has been feeling well and has not any recent fevers or illness. Ross was counseled regarding the risks and benefits of today's procedure and gave verbal and written consent to proceed.    History reviewed. No pertinent past medical history.  Family History   Problem Relation Age of Onset     Allergies Mother      Allergies Father      Social History     Tobacco Use     Smoking status: Never Smoker     Smokeless tobacco: Never Used   Substance Use Topics     Alcohol use: Yes     Drug use: Never     Social History     Social History Narrative     Not on file       Past medical, family, and social history were reviewed.    REVIEW OF SYSTEMS:  General: negative for weight gain. negative for weight loss. negative for changes in sleep.   Eyes: negative for itching. negative for redness. negative for tearing/watering. negative for vision changes  Ears: negative for fullness. negative for hearing loss. negative for dizziness.   Nose: negative for snoring.negative for changes in smell. negative for drainage.   Throat: negative for hoarseness. negative for sore throat. negative for trouble swallowing.   Lungs: negative for cough. negative for shortness of breath.negative for wheezing. negative for sputum production.   Cardiovascular: negative for chest pain. negative for swelling of ankles. negative for fast or irregular heartbeat.   Gastrointestinal: negative for nausea. negative for heartburn. negative for acid reflux.   Musculoskeletal: negative for joint pain. negative for joint stiffness. negative for joint swelling.   Neurologic: negative for seizures. negative for fainting. negative for weakness.   Psychiatric: negative for changes in mood. negative for anxiety.   Endocrine: negative for cold  intolerance. negative for heat intolerance. negative for tremors.   Hematologic: negative for easy bruising. negative for easy bleeding.  Integumentary: negative for rash. negative for scaling. negative for nail changes.       Current Outpatient Medications:      EPINEPHrine (AUVI-Q) 0.3 MG/0.3ML injection 2-pack, Inject 0.3 mLs (0.3 mg) into the muscle as needed for anaphylaxis, Disp: 2 each, Rfl: 2     erythromycin (ROMYCIN) 5 MG/GM ophthalmic ointment, as needed, Disp: , Rfl:      famotidine (PEPCID) 20 MG tablet, Take 1 tablet (20 mg) by mouth 2 times daily, Disp: 60 tablet, Rfl: 1     fluticasone (FLONASE) 50 MCG/ACT nasal spray, Spray 1 spray into both nostrils daily, Disp: 16 g, Rfl: 1     metroNIDAZOLE (METROGEL) 0.75 % external gel, Apply topically 2 times daily, Disp: 45 g, Rfl: 1     mineral oil-hydrophilic petrolatum (AQUAPHOR) external ointment, Apply topically as needed, Disp: , Rfl:      mometasone (ELOCON) 0.1 % external cream, Apply topically daily To hands, Disp: 45 g, Rfl: 1     montelukast (SINGULAIR) 10 MG tablet, Take 1 tablet (10 mg) by mouth At Bedtime, Disp: 90 tablet, Rfl: 1     mupirocin (BACTROBAN) 2 % external cream, Apply topically 3 times daily To nose, Disp: 30 g, Rfl: 1     ORDER FOR ALLERGEN IMMUNOTHERAPY, Name of Mix: Mix #1  Mold, Cat Cat Hair, Standardized 10,000 BAU/mL, ALK  2.0 ml  Alternaria Tenuis 1:10 w/v, HS  0.5 ml Epicoccum Nigrum 1:10 w/v, HS 0.5 ml Phoma Herbarum 1:10 w/v, HS  0.5 ml Diluent: HSA qs to 5ml, Disp: 5 mL, Rfl: PRN     ORDER FOR ALLERGEN IMMUNOTHERAPY, Name of Mix: Mix #2  Dust Mite, Dog, Grass, Weeds Dog Hair-Dander, A. P.  1:100 w/v, HS  1.0 ml Dust Mites DP. 10,000 AU/mL, HS  1.0 ml  Speedy Grass (Std) 100,000 BAU/mL, HS 0.4 ml Pigweed, Careless 1:20 w/v, HS 0.5 ml Ragweed Mixed 1:20 w/v ALK  0.5 ml Sorrel, Sheep 1:20 w/v, HS 0.5 ml Diluent: HSA qs to 5ml, Disp: 5 mL, Rfl: PRN     ORDER FOR ALLERGEN IMMUNOTHERAPY, Name of Mix: Mix #3  Tree  Birch Mix  PRW 1:20 w/v, HS  0.5 ml Boxelder-Maple Mix BHR (Boxelder Hard Red) 1:20 w/v, HS  0.5 ml Elm, American 1:20 w/v, HS  0.5 ml Hackberry 1:20 w/v, HS 0.5 ml Hickory, Shagbark 1:20 w/v, HS  0.5 ml Pleasant Hill Mix RW 1:20 w/v, HS 0.5 ml Oak Mix RVW 1:20 w/v, HS 0.5 ml Duncan Tree, Black 1:20 w/v, HS 0.5 ml Tustin, Black 1:20 w/v, HS 0.5 ml Diluent: HSA qs to 5ml, Disp: 5 mL, Rfl: PRN     tacrolimus (PROTOPIC) 0.1 % external ointment, Apply once daily to rash on the nose and eyelids, Disp: 30 g, Rfl: 11     cetirizine (ZYRTEC) 10 MG tablet, Take 10 mg by mouth daily (Patient not taking: Reported on 9/9/2021), Disp: , Rfl:      EPINEPHrine (ANY BX GENERIC EQUIV) 0.3 MG/0.3ML injection 2-pack, Inject 0.3 mLs (0.3 mg) into the muscle as needed for anaphylaxis (Patient not taking: Reported on 8/5/2021), Disp: 0.6 mL, Rfl: 2  Allergies   Allergen Reactions     Doxycycline      Eye swelling        EXAM:   /74 (BP Location: Right arm, Patient Position: Sitting, Cuff Size: Adult Regular)   Pulse 52   SpO2 98%   GENERAL APPEARANCE: alert, cooperative and not in distress  SKIN: no rashes, no lesions  HEAD: atraumatic, normocephalic  ENT: no scars or lesions, tongue midline and normal, soft palate, uvula, and tonsils normal  NECK: no asymmetry, masses, or scars, supple without significant adenopathy  LUNGS: unlabored respirations, no intercostal retractions or accessory muscle use, clear to auscultation without rales or wheezes  HEART: regular rate and rhythm without murmurs and normal S1 and S2  MUSCULOSKELETAL: no musculoskeletal defects are noted  NEURO: no focal deficits noted  PSYCH: does not appear depressed or anxious      WORKUP:  Food challenge    Open Egg Food Allergy Challenge  Open Egg Food Allergy Challenge 9/9/2021   Start Time:  7:14 AM   Were the patient's pre-testing guidelines reviewed with the patient? Yes   Were the patient's pre-testing guidelines reviewed with the patient? Yes   Preparation of Sample:  scrambled egg   Emergency equipment available? Yes   Skin Testing Results (Mm) 0   Antigen Specific IqE Results: 0.38   Increment 1 start time:  7:14 AM   Increment 1 route: Ingested   Dose: 1 g   Vitals Time:  7:30 AM   /78   Pulse: 52   SpO2 97%   Did the patient have a severe or unexpected reaction? No, continue test   Increment 2 start time:  7:30 AM   Increment 2 route: Ingested   Dose: 2 g   Vitals Time:  7:46 AM   /81   Pulse: 58   SpO2 99%   Did the patient have a severe or unexpected reaction? No, continue test   Increment 3 start time:  7:47 AM   Increment 3 route: Ingested   Dose: 5 g   Vitals Time:  8:03 AM   /78   Pulse: 47   SpO2 99%   Increment 4 start time:  8:04 AM   Increment 4 route: Ingested   /83   Pulse: 45   SpO2 100%   Did the patient have a severe or unexpected reaction? No, continue test   Open Egg Increment 5:  8:23 AM   Increment 5 route: Ingested   Dose: 20 g   Vitals Time:  8:42 AM   /83   Pulse: 48   SpO2 99%   Did the patient have a severe or unexpected reaction? No, continue test   Increment 6 start time:  8:43 AM   Increment 6 route: Ingested   Dose: 20 g   Vitals Time:  8:59 AM   /80   Pulse: 48   SpO2 99%   Did the patient have a severe or unexpected reaction? No, end test   End Time: 11:06 AM   Observation 1 Vitals Time:  9:31 AM   /80   Pulse: 47   SpO2: 98%   Reaction: none   Treatment: N/A   Observation 2 Vitals Time: 10:02 AM   /80   Pulse: 47   SpO2: 98%   Reaction: none    Treatment: N/A   Observation 3 Vitals Time: 10:34 AM   /78   Pulse: 46   SpO2: 98%   Reaction: none   Treatment: N/A   Observation 4 Vitals Time: 11:06 AM   /76   Pulse: 46   SpO2: 98%   Reaction: none   Treatment: N/A   Interpretation: Pass        ASSESSMENT/PLAN:  Chago Soni is a 26 year old male here for an oral food challenge to scrambled egg.    1. Adverse reaction to food, subsequent encounter - Ross tolerated today's procedure without  developing any signs or symptoms of an adverse reaction.    - may begin including egg in the diet without restrictionis  - MS INGESTION CHALLENGE TEST INITIAL 120 MINUTES  - MS INGESTION CHALLENGE TEST EACH ADDL 60 MINUTES    2. Egg allergy    - MS INGESTION CHALLENGE TEST INITIAL 120 MINUTES  - MS INGESTION CHALLENGE TEST EACH ADDL 60 MINUTES      Thank you for allowing me to participate in the care of Chago ANÍBAL Rivaser.      Shavonne Torres MD, FAAAAI  Allergy/Immunology  [facility]      Chart documentation done in part with Dragon Voice Recognition Software. Although reviewed after completion, some word and grammatical errors may remain.

## 2021-09-09 NOTE — PROGRESS NOTES
Patient evaluated by provider initially, prior to start of oral food challenge.  RN administered scrambled egg per physician directed guidelines.  Patient was monitored for 15 minutes at each administered dose.  Once patient reached final dose, patient was monitored for 2 hours.  RN obtained 02 sat and pulse after each dose and reviewed any possible signs/symptoms of adverse reactions with patient.  If negative for any adverse reactions, RN then went to next dose interval.  Patient tolerated well.  All questions and concerns were addressed in clinic during oral food challenge.     Lucia GRAY RN

## 2021-09-13 ENCOUNTER — ALLIED HEALTH/NURSE VISIT (OUTPATIENT)
Dept: ALLERGY | Facility: CLINIC | Age: 26
End: 2021-09-13
Payer: OTHER GOVERNMENT

## 2021-09-13 DIAGNOSIS — J30.1 SEASONAL ALLERGIC RHINITIS DUE TO POLLEN: Primary | ICD-10-CM

## 2021-09-13 PROCEDURE — 95117 IMMUNOTHERAPY INJECTIONS: CPT

## 2021-09-13 NOTE — PROGRESS NOTES
Patient presented after waiting 30 minutes with no reaction to allergy injections. Discharged from clinic.    Lucia GARY RN

## 2021-09-26 ENCOUNTER — HEALTH MAINTENANCE LETTER (OUTPATIENT)
Age: 26
End: 2021-09-26

## 2021-09-27 ENCOUNTER — OFFICE VISIT (OUTPATIENT)
Dept: FAMILY MEDICINE | Facility: CLINIC | Age: 26
End: 2021-09-27
Payer: OTHER GOVERNMENT

## 2021-09-27 ENCOUNTER — ANCILLARY PROCEDURE (OUTPATIENT)
Dept: GENERAL RADIOLOGY | Facility: CLINIC | Age: 26
End: 2021-09-27
Attending: PHYSICIAN ASSISTANT
Payer: OTHER GOVERNMENT

## 2021-09-27 ENCOUNTER — ALLIED HEALTH/NURSE VISIT (OUTPATIENT)
Dept: ALLERGY | Facility: CLINIC | Age: 26
End: 2021-09-27
Payer: OTHER GOVERNMENT

## 2021-09-27 VITALS
TEMPERATURE: 98.4 F | HEART RATE: 62 BPM | WEIGHT: 197 LBS | DIASTOLIC BLOOD PRESSURE: 80 MMHG | BODY MASS INDEX: 27.48 KG/M2 | SYSTOLIC BLOOD PRESSURE: 128 MMHG

## 2021-09-27 DIAGNOSIS — G89.29 CHRONIC RIGHT SHOULDER PAIN: ICD-10-CM

## 2021-09-27 DIAGNOSIS — L30.9 PERIORBITAL DERMATITIS: ICD-10-CM

## 2021-09-27 DIAGNOSIS — J30.1 SEASONAL ALLERGIC RHINITIS DUE TO POLLEN: Primary | ICD-10-CM

## 2021-09-27 DIAGNOSIS — M25.511 CHRONIC RIGHT SHOULDER PAIN: ICD-10-CM

## 2021-09-27 DIAGNOSIS — R41.840 POOR CONCENTRATION: Primary | ICD-10-CM

## 2021-09-27 PROCEDURE — 95117 IMMUNOTHERAPY INJECTIONS: CPT

## 2021-09-27 PROCEDURE — 99214 OFFICE O/P EST MOD 30 MIN: CPT | Performed by: PHYSICIAN ASSISTANT

## 2021-09-27 PROCEDURE — 73030 X-RAY EXAM OF SHOULDER: CPT | Mod: RT | Performed by: RADIOLOGY

## 2021-09-27 RX ORDER — METHOCARBAMOL 750 MG/1
750 TABLET, FILM COATED ORAL 4 TIMES DAILY PRN
Qty: 40 TABLET | Refills: 1 | Status: SHIPPED | OUTPATIENT
Start: 2021-09-27 | End: 2022-07-06

## 2021-09-27 RX ORDER — BENZOCAINE/MENTHOL 6 MG-10 MG
LOZENGE MUCOUS MEMBRANE 2 TIMES DAILY
Qty: 45 G | Refills: 0 | Status: SHIPPED | OUTPATIENT
Start: 2021-09-27 | End: 2022-07-06

## 2021-09-27 NOTE — PROGRESS NOTES
Patient presented after waiting 30 minutes with no reaction to allergy injections. Discharged from clinic.    Lucia GRAY RN

## 2021-09-27 NOTE — PROGRESS NOTES
"    Assessment & Plan     Poor concentration  Likely adhd.  After eval can discuss medications   - MENTAL HEALTH REFERRAL  - Adult; Assessments and Testing; ADHD; [facility] [phone]; We will contact you to schedule the appointment or please call with any questions; Future    Chronic right shoulder pain  Suspect rotator as the cause of pain.  Get xray.  If normal consider mri.  Has failed conservative treatment.    - XR Shoulder Right 2 Views  - methocarbamol (ROBAXIN) 750 MG tablet; Take 1 tablet (750 mg) by mouth 4 times daily as needed for muscle spasms    Periorbital dermatitis  Follow up as needed  - hydrocortisone (CORTAID) 1 % external cream; Apply topically 2 times daily Around eye             BMI:   Estimated body mass index is 27.48 kg/m  as calculated from the following:    Height as of 4/12/21: 1.803 m (5' 11\").    Weight as of this encounter: 89.4 kg (197 lb).   Weight management plan: pt not overweight         Return in about 2 weeks (around 10/11/2021) for if not improving.    Dana Gotti PA-C  [facility] CRISTOPHER Hawkins is a 26 year old who presents for the following health issues     HPI     Eye(s) Problem on and off   Onset/Duration: over 1 year   Description:   Location: YES  Pain: no itchy   Redness: YES  Accompanying Signs & Symptoms:  Discharge/mattering: no  Swelling: YES  Visual changes: no  Fever: no  Nasal Congestion: no  Bothered by bright lights: no  History:  Trauma: no  Foreign body exposure: no  Wearing contacts: no  Precipitating or alleviating factors: None  Therapies tried and outcome: None  Getting allergy shots.  Still having problems with right eye   Swelling and redness on right eye   Not painful but some itching   Erythromycin doesn't help    R shoulder pain   Onset/Duration: 3 years,getting worse   Description  Location: shoulder  - right  Joint Swelling: no  Redness: no  Pain: no  Warmth: no  Intensity:  " 3/10  Progression of Symptoms:  worsening  Accompanying signs and symptoms:   Fevers: no  Numbness/tingling/weakness: YES weakness and tightness   History  Trauma to the area: no  Recent illness:  no  Previous similar problem: no  Previous evaluation:  yes  Precipitating or alleviating factors:  Aggravating factors include: overuse  Therapies tried and outcome: chiropractor and physical therapy-did not help   Stopped playing basketball and it didn't improve pain         Referral to see specialist(for poss ADHD)-didn't do well in school.  Hard to finish tasks.  Still wants to finish school and job changed and it makes it hard to do the work.              Review of Systems   As above      Objective    /80   Pulse 62   Temp 98.4  F (36.9  C) (Oral)   Wt 89.4 kg (197 lb)   BMI 27.48 kg/m    Body mass index is 27.48 kg/m .  Physical Exam  Constitutional:       General: He is not in acute distress.  HENT:      Right Ear: Tympanic membrane, ear canal and external ear normal.      Left Ear: Tympanic membrane, ear canal and external ear normal.      Mouth/Throat:      Mouth: Mucous membranes are moist.      Pharynx: No oropharyngeal exudate.   Eyes:      Extraocular Movements: Extraocular movements intact.      Pupils: Pupils are equal, round, and reactive to light.        Comments: Red slightly thickened skin    Cardiovascular:      Rate and Rhythm: Normal rate and regular rhythm.   Pulmonary:      Effort: Pulmonary effort is normal.      Breath sounds: Normal breath sounds.   Musculoskeletal:      Right shoulder: Tenderness (bicep groove ) present. Decreased range of motion (slight decrease with both internal and external rotation ).      Left shoulder: Normal.      Cervical back: Normal.      Comments: Positive hawking and neers on right   Right trap muscle firm and tight    Lymphadenopathy:      Cervical: No cervical adenopathy.   Neurological:      Mental Status: He is alert.

## 2021-10-05 ENCOUNTER — ANCILLARY PROCEDURE (OUTPATIENT)
Dept: MRI IMAGING | Facility: CLINIC | Age: 26
End: 2021-10-05
Attending: PHYSICIAN ASSISTANT
Payer: OTHER GOVERNMENT

## 2021-10-05 DIAGNOSIS — G89.29 CHRONIC RIGHT SHOULDER PAIN: ICD-10-CM

## 2021-10-05 DIAGNOSIS — M25.511 CHRONIC RIGHT SHOULDER PAIN: ICD-10-CM

## 2021-10-05 PROCEDURE — 73221 MRI JOINT UPR EXTREM W/O DYE: CPT | Mod: RT | Performed by: RADIOLOGY

## 2021-10-06 NOTE — RESULT ENCOUNTER NOTE
Ross,    This is a reassuring result. No rotator cuff tear was seen. I recommend physical therapy or a consult with our Sports Medicine specialist if your symptoms persist.     Larissa Pimentel MD

## 2021-10-12 ENCOUNTER — ALLIED HEALTH/NURSE VISIT (OUTPATIENT)
Dept: ALLERGY | Facility: CLINIC | Age: 26
End: 2021-10-12
Payer: OTHER GOVERNMENT

## 2021-10-12 DIAGNOSIS — J30.1 SEASONAL ALLERGIC RHINITIS DUE TO POLLEN: Primary | ICD-10-CM

## 2021-10-12 PROCEDURE — 95117 IMMUNOTHERAPY INJECTIONS: CPT

## 2021-11-08 ENCOUNTER — ALLIED HEALTH/NURSE VISIT (OUTPATIENT)
Dept: ALLERGY | Facility: CLINIC | Age: 26
End: 2021-11-08
Payer: OTHER GOVERNMENT

## 2021-11-08 DIAGNOSIS — J30.1 SEASONAL ALLERGIC RHINITIS DUE TO POLLEN: ICD-10-CM

## 2021-11-08 DIAGNOSIS — J30.81 ALLERGIC RHINITIS DUE TO ANIMALS: ICD-10-CM

## 2021-11-08 DIAGNOSIS — H10.13 ALLERGIC CONJUNCTIVITIS, BILATERAL: ICD-10-CM

## 2021-11-08 DIAGNOSIS — J30.89 ALLERGIC RHINITIS DUE TO DUST MITE: ICD-10-CM

## 2021-11-08 DIAGNOSIS — J30.1 SEASONAL ALLERGIC RHINITIS DUE TO POLLEN: Primary | ICD-10-CM

## 2021-11-08 DIAGNOSIS — J30.89 ALLERGIC RHINITIS DUE TO MOLD: ICD-10-CM

## 2021-11-08 PROCEDURE — 95117 IMMUNOTHERAPY INJECTIONS: CPT

## 2021-11-08 NOTE — PROGRESS NOTES
Patient presented after waiting 30 minutes with no reaction to allergy injections. Discharged from clinic.    Jake SMITH RN....11/8/2021 8:51 AM

## 2021-11-08 NOTE — TELEPHONE ENCOUNTER
ALLERGY SOLUTION RE-ORDER REQUEST    Chago Soni 1995 MRN: [id number]    DATE NEEDED:  2 weeks  Vial Color Content    Vial Size  Red 1:1 Dog, Grass, Dust Mite, Weeds    5 ml  Red 1:1 Trees   5 ml  Red 1:1 Cat, Molds    5 ml        Serum reorder consent signed and patient/parent was advised that new serums would be ordered through the pharmacy and billed to their insurance company when they arrive in clinic. Yes    Shot Clinic Location:  [place]  Ship to Location: [place]  Serum billed to:  [place]    Special Instructions:  NA        Requester Signature  Xiao SHAW MA

## 2021-11-12 ENCOUNTER — VIRTUAL VISIT (OUTPATIENT)
Dept: FAMILY MEDICINE | Facility: CLINIC | Age: 26
End: 2021-11-12
Payer: OTHER GOVERNMENT

## 2021-11-12 DIAGNOSIS — B00.9 HERPES SIMPLEX VIRUS INFECTION: ICD-10-CM

## 2021-11-12 DIAGNOSIS — L01.00 IMPETIGO: ICD-10-CM

## 2021-11-12 DIAGNOSIS — L71.9 ROSACEA: Primary | ICD-10-CM

## 2021-11-12 PROCEDURE — 99213 OFFICE O/P EST LOW 20 MIN: CPT | Mod: 95 | Performed by: NURSE PRACTITIONER

## 2021-11-12 RX ORDER — MUPIROCIN 20 MG/G
OINTMENT TOPICAL 3 TIMES DAILY
Qty: 30 G | Refills: 1 | Status: SHIPPED | OUTPATIENT
Start: 2021-11-12 | End: 2022-07-06

## 2021-11-12 RX ORDER — VALACYCLOVIR HYDROCHLORIDE 1 G/1
1000 TABLET, FILM COATED ORAL 2 TIMES DAILY
Qty: 6 TABLET | Refills: 4 | Status: SHIPPED | OUTPATIENT
Start: 2021-11-12 | End: 2022-01-26

## 2021-11-12 NOTE — PROGRESS NOTES
Answers for HPI/ROS submitted by the patient on 11/12/2021  How many servings of fruits and vegetables do you eat daily?: 2-3  On average, how many sweetened beverages do you drink each day (Examples: soda, juice, sweet tea, etc.  Do NOT count diet or artificially sweetened beverages)?: 0  How many minutes a day do you exercise enough to make your heart beat faster?: 60 or more  How many days a week do you exercise enough to make your heart beat faster?: 5  How many days per week do you miss taking your medication?: 0    Ross is a 26 year old who is being evaluated via a billable video visit.      How would you like to obtain your AVS? MyChart  If the video visit is dropped, the invitation should be resent by:   Will anyone else be joining your video visit?     Video Start Time: 10:53 AM    Assessment & Plan       ICD-10-CM    1. Rosacea  L71.9    2. Herpes simplex virus infection  B00.9 valACYclovir (VALTREX) 1000 mg tablet   3. Impetigo  L01.00 mupirocin (BACTROBAN) 2 % external ointment    likely multi issues, rosacea chronic and work on lifestyle, try topical with calendula from coop, let me or Derm know if doesn't help enough with these     Continue bactroban for 5 days three times a day, stop touching face, warm clean compresses three times a day see patient instructions     Try viral tx and see if this helps it clear up, next outbreak take this first         No follow-ups on file.    [employer], PAULA CNP  [facility]    Subjective   Ross is a 26 year old who presents for the following health issues     HPI     Nasal lesion  2 different Derm people now, last sore almost a year ago  Does come in different locations so not cancerous  Rosacea diagnosis but he feels it's not right diagnosis   Small clear bubble appears out of nowhere then gets scabbed yellow crust like this  Did antibiotics by mouth at one point no longer dooing  Has bactroban he started using yesterday three times a  day, in the past it cleared up the staph, culture was done  Was told it is also viral perhaps, no records for me to view from that, no sores elsewhree and no MRSA history     No alcohol but Drinks a lot of caffeine        Review of Systems   Constitutional, HEENT, cardiovascular, pulmonary, GI, , musculoskeletal, neuro, skin, endocrine and psych systems are negative, except as otherwise noted.      Objective           Vitals:  No vitals were obtained today due to virtual visit.    Physical Exam   GENERAL: Healthy, alert and no distress  EYES: Eyes grossly normal to inspection.  No discharge or erythema, or obvious scleral/conjunctival abnormalities.  RESP: No audible wheeze, cough, or visible cyanosis.  No visible retractions or increased work of breathing.    SKIN: erythematous rosacea appearing nose, left side   NEURO: Cranial nerves grossly intact.  Mentation and speech appropriate for age.  PSYCH: Mentation appears normal, affect normal/bright, judgement and insight intact, normal speech and appearance well-groomed.                Video-Visit Details    Type of service:  Video Visit    Video End Time:11:14 AM    Originating Location (pt. Location): Home    Distant Location (provider location):  [facility]     Platform used for Video Visit: [employer]

## 2021-11-12 NOTE — PATIENT INSTRUCTIONS
Patient Education     Rosacea   Rosacea is a long-lasting (chronic) skin condition that often affects the face. In the early stages it causes easy flushing or blushing. Redness may become long-term (permanent) as the small blood vessels of the face widen (dilate). You may have small, red, pus-filled bumps (pustules). It looks like a bad case of acne, and has been called adult acne. But it's not caused by the same things that cause acne.  You will have flare-ups that come and go. They may happen every few weeks or every few months. Experts don't know what causes rosacea. If not treated, it tends to get worse over time.  Some older men often have a more severe form of rosacea (rhinophyma). The oil glands on the skin of the nose become larger and the nose gets bigger. The cheeks also become puffy. Alcohol may make the flushing worse. But this condition is not caused by alcohol use.  Rosacea can be treated with gels and creams for the skin (topical). You may need antibiotic medicine by mouth for more severe cases. You should see improvement in the first 4 weeks. Dilated blood vessels can be treated with a small electric needle or laser surgery. Rhinophyma can be treated with surgery. Or it may be treated by surgically scraping the skin (dermabrasion).  Home care    Don't have foods or drinks that make your face red or flushed. These include hot drinks, spicy foods, caffeine, and alcohol.    Exercise indoors or in a cool area so you won't get overheated.    Limit your sun exposure. Use a sunscreen with at least SPF 30 on your face every day.    Stay out of extreme hot or extreme cold weather.    Don't scrub your face. That will irritate the skin and make the redness worse.    Don't use harsh soaps or moisturizers with irritating ingredients. You may need to use hypoallergenic cosmetics.    Don't use over-the-counter treatments unless your healthcare provider advises it. Some of these treatments may make rosacea  worse.    Try to figure out what triggers your flares. This might be stress, sun exposure, or certain foods.    Follow-up care  Follow up with your healthcare provider, or as advised. Contact your provider if your condition does not get better after taking the medicines you were given. Getting treatment early can stop rosacea from getting worse.  When to seek medical advice  Call your healthcare provider right away if you have redness, burning, or a gritty feeling in your eyes.  [employer] last reviewed this educational content on 11/1/2019 2000-2021 [employer]. All rights reserved. This information is not intended as a substitute for professional medical care. Always follow your healthcare professional's instructions.

## 2021-11-16 NOTE — TELEPHONE ENCOUNTER
Routing refill request to provider for review/approval because:  Drug not active on patient's medication list    The original prescription was discontinued on 11/12/2021 by [employer] Brothers APRN CNP for the following reason: Medication Reconciliation Clean Up. Renewing this prescription may not be appropriate.    Samaria GRAY RN  Specialty/Allergy Clinic

## 2021-11-19 DIAGNOSIS — J30.89 ALLERGIC RHINITIS DUE TO MOLD: ICD-10-CM

## 2021-11-19 DIAGNOSIS — J30.89 ALLERGIC RHINITIS DUE TO DUST MITE: ICD-10-CM

## 2021-11-19 DIAGNOSIS — J30.81 ALLERGIC RHINITIS DUE TO ANIMALS: Primary | ICD-10-CM

## 2021-11-19 DIAGNOSIS — H10.13 ALLERGIC CONJUNCTIVITIS, BILATERAL: ICD-10-CM

## 2021-11-19 DIAGNOSIS — J30.1 SEASONAL ALLERGIC RHINITIS DUE TO POLLEN: ICD-10-CM

## 2021-11-19 PROCEDURE — 95165 ANTIGEN THERAPY SERVICES: CPT | Performed by: ALLERGY & IMMUNOLOGY

## 2021-11-19 NOTE — PROGRESS NOTES
Allergy serums billed to [facility].     Vials billed below:    Vial Color Content                      Vial Size Expiration Date  Red 1:1                                   Dog, Grass, Dust Mite, Weeds               5 ml     11/19/2022  Red 1:1                                   Trees                                                   5 ml 11/19/2022  Red 1:1                                   Cat, Molds                                           5 ml 11/19/2022    Checked by Martin SPANGLER  30 Units billed    Signature  Samaria Jones RN

## 2021-11-23 NOTE — TELEPHONE ENCOUNTER
Allergy serums received at [place].     Vials received below:    Vial Color Content                      Vial Size Expiration Date  Red 1:1 Cat, Molds 5mL  11-  Red 1:1 Dog, Grass, Dust Mite, Weeds 5mL  11-  Red 1:1 Trees 5mL  11-    Magdaleno Conway RN

## 2021-12-06 ENCOUNTER — ALLIED HEALTH/NURSE VISIT (OUTPATIENT)
Dept: ALLERGY | Facility: CLINIC | Age: 26
End: 2021-12-06
Payer: OTHER GOVERNMENT

## 2021-12-06 DIAGNOSIS — J30.1 SEASONAL ALLERGIC RHINITIS DUE TO POLLEN: Primary | ICD-10-CM

## 2021-12-06 PROCEDURE — 95117 IMMUNOTHERAPY INJECTIONS: CPT

## 2022-01-03 ENCOUNTER — ALLIED HEALTH/NURSE VISIT (OUTPATIENT)
Dept: ALLERGY | Facility: CLINIC | Age: 27
End: 2022-01-03
Payer: OTHER GOVERNMENT

## 2022-01-03 DIAGNOSIS — J30.1 SEASONAL ALLERGIC RHINITIS DUE TO POLLEN: Primary | ICD-10-CM

## 2022-01-03 PROCEDURE — 95117 IMMUNOTHERAPY INJECTIONS: CPT

## 2022-01-11 ENCOUNTER — ALLIED HEALTH/NURSE VISIT (OUTPATIENT)
Dept: ALLERGY | Facility: CLINIC | Age: 27
End: 2022-01-11
Payer: OTHER GOVERNMENT

## 2022-01-11 DIAGNOSIS — J30.1 SEASONAL ALLERGIC RHINITIS DUE TO POLLEN: Primary | ICD-10-CM

## 2022-01-11 PROCEDURE — 95117 IMMUNOTHERAPY INJECTIONS: CPT

## 2022-01-11 PROCEDURE — 99207 PR DROP WITH A PROCEDURE: CPT

## 2022-01-25 ENCOUNTER — ALLIED HEALTH/NURSE VISIT (OUTPATIENT)
Dept: ALLERGY | Facility: CLINIC | Age: 27
End: 2022-01-25
Payer: OTHER GOVERNMENT

## 2022-01-25 DIAGNOSIS — J30.1 SEASONAL ALLERGIC RHINITIS DUE TO POLLEN: Primary | ICD-10-CM

## 2022-01-25 PROCEDURE — 95117 IMMUNOTHERAPY INJECTIONS: CPT

## 2022-01-26 ENCOUNTER — VIRTUAL VISIT (OUTPATIENT)
Dept: FAMILY MEDICINE | Facility: CLINIC | Age: 27
End: 2022-01-26
Payer: OTHER GOVERNMENT

## 2022-01-26 DIAGNOSIS — R13.10 DYSPHAGIA, UNSPECIFIED TYPE: Primary | ICD-10-CM

## 2022-01-26 PROCEDURE — 99213 OFFICE O/P EST LOW 20 MIN: CPT | Mod: 95 | Performed by: PHYSICIAN ASSISTANT

## 2022-01-26 NOTE — PATIENT INSTRUCTIONS
Please call [facility] for appointment: [phone]     If unable to get in with [facility] you can also try [facility]: [phone]

## 2022-01-26 NOTE — PROGRESS NOTES
"Ross is a 26 year old who is being evaluated via a billable video visit.      How would you like to obtain your AVS? MyChart  If the video visit is dropped, the invitation should be resent by:   Will anyone else be joining your video visit? No      Video Start Time: 4:03 PM    Assessment and Plan:     (R13.10) Dysphagia, unspecified type  (primary encounter diagnosis)  Comment: x 6 months, seems to be worsening, able to handle secretions, no airway compromise, food feels like it is getting stuck at level of sternum, painful at times  Plan: Adult Gastro Ref - Consult Only  Given information for [facility] and [facility]  Discussed when to be seen promptly       Briana Dunn PA-C        Subjective   Ross is a 26 year old who presents for the following health issues  HPI     Has had difficulty swallowing x 6 months  Symptoms have worsened in the last few weeks  Feels like food gets \"stuck\" at times, when food does get stuck feels like it is at the level of his sternum  Able to breath and handle secretions w/out difficulty  Denies recent choking episodes   Has taken famotidine in the past, took for a few weeks and didn't help with symptoms   He has never had an upper endoscopy      Review of Systems   See above      Objective           Vitals:  No vitals were obtained today due to virtual visit.    Physical Exam   GENERAL: Healthy, alert and no distress  EYES: Eyes grossly normal to inspection.  No discharge or erythema, or obvious scleral/conjunctival abnormalities.  RESP: No audible wheeze, cough, or visible cyanosis.  No visible retractions or increased work of breathing.    SKIN: Visible skin clear. No significant rash, abnormal pigmentation or lesions.  NEURO: Cranial nerves grossly intact.  Mentation and speech appropriate for age.  PSYCH: Mentation appears normal, affect normal/bright, judgement and insight intact, normal speech and appearance well-groomed.          Video-Visit Details    Type of service:  " Video Visit    Video End Time:4:16 PM    Originating Location (pt. Location): Home    Distant Location (provider location):  [facility]     Platform used for Video Visit: Loren

## 2022-02-01 ENCOUNTER — OFFICE VISIT (OUTPATIENT)
Dept: ORTHOPEDICS | Facility: CLINIC | Age: 27
End: 2022-02-01
Attending: PHYSICIAN ASSISTANT
Payer: OTHER GOVERNMENT

## 2022-02-01 ENCOUNTER — THERAPY VISIT (OUTPATIENT)
Dept: PHYSICAL THERAPY | Facility: CLINIC | Age: 27
End: 2022-02-01
Payer: OTHER GOVERNMENT

## 2022-02-01 VITALS
HEIGHT: 71 IN | SYSTOLIC BLOOD PRESSURE: 124 MMHG | WEIGHT: 185 LBS | DIASTOLIC BLOOD PRESSURE: 72 MMHG | BODY MASS INDEX: 25.9 KG/M2

## 2022-02-01 DIAGNOSIS — M25.511 CHRONIC RIGHT SHOULDER PAIN: ICD-10-CM

## 2022-02-01 DIAGNOSIS — G89.29 CHRONIC RIGHT SHOULDER PAIN: ICD-10-CM

## 2022-02-01 PROCEDURE — 97110 THERAPEUTIC EXERCISES: CPT | Mod: GP

## 2022-02-01 PROCEDURE — 99203 OFFICE O/P NEW LOW 30 MIN: CPT | Performed by: PEDIATRICS

## 2022-02-01 PROCEDURE — 97161 PT EVAL LOW COMPLEX 20 MIN: CPT | Mod: GP

## 2022-02-01 ASSESSMENT — MIFFLIN-ST. JEOR: SCORE: 1841.28

## 2022-02-01 NOTE — PROGRESS NOTES
ASSESSMENT & PLAN    Chago was seen today for pain.    Diagnoses and all orders for this visit:    Chronic right shoulder pain  -     Orthopedic  Referral  -     [facility]; Future      We discussed the following: symptom treatment, activity modification/rest, imaging, rehab, injection therapy and referral to ortho surgeon. Following discussion, plan:  Diagnostic injection with imaging guidance next, along with PT. We discussed potential for steroid injection as well, though he prefers to avoid, which is understandable.      See Patient Instructions  Patient Instructions   Plan to have you see one of my colleagues for an ultrasound-guided diagnostic injection (local anesthetic) into the areas in question the shoulder, subacromial bursa, as well as AC joint (likely both, but depending on response to the first).  This will help us better understand the particular pain generator in the shoulder, given MRI findings fit best with impingement, though the pain pattern appears more consistent with functional issues and possibly AC joint component.  Also plan return to physical therapy, referral placed.  You may wait to start the therapy until after the diagnostic injection, if desired.  Request contact clinic with an update in approximately 3 to 4 weeks, with response to therapy, sooner if needed.  Contact clinic sooner if any questions or concerns.    If you have any further questions for your physician or physician s care team you can call [phone] and use option 3 to leave a voice message. Calls received during business hours will be returned same day.        Prashanth Romero [facility]      CC: Dana Gotti      -----  Chief Complaint   Patient presents with     Right Shoulder - Pain       SUBJECTIVE  Chago Soni is a/an 26 year old male who is seen in consultation at the request of  Dana Gotti PA-C for evaluation of  "right shoulder pain.  Pain has been present for the past 3 years.  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 He did notice that there was an imbalance of his shoulder musculature with how his arm hung.  Pain currently in the anterior aspect of his shoulder, into his clavicle and behind his shoulder.     The patient is seen by themselves.  The patient is Right handed    Onset: 3 years(s) ago. Reports insidious onset without acute precipitating event.  Location of Pain: right shoulder   Worsened by: morning, certain activities   Better with: certain activities   Treatments tried: physical therapy , previous imaging (x rays, MRI) and chiropractic care   Associated symptoms: grinding, popping    Orthopedic/Surgical history: NO  Social History/Occupation: [employer]     No family history pertinent to patient's problem today.   **  Pain is more superior. Has tightness sensation anterosuperior shoulder, points inferior to distal clavicle.  Lately having some right periscapular and upper trap pain.  Not really any pain into upper arm.    Feels like shoulders are more protracted, feels like difficult to be able to do scap retraction. Feels like has lost ability to stretch that area.  Pain in neck/upper back area comes and goes.  Notes some noise with shoulder motion. Feels a pop at superior shoulder, points over AC joint.    Some benefit with PT end of 2020. However, some of the exercises from [facility] were challenging as he felt like it was hard to get into position (e.g., with scap retraction).        REVIEW OF SYSTEMS:  Review of Systems   All other systems reviewed and are negative.        OBJECTIVE:  /72   Ht 1.803 m (5' 11\")   Wt 83.9 kg (185 lb)   BMI 25.80 kg/m     General: healthy, alert and in no distress  HEENT: no scleral icterus or conjunctival erythema  Skin: no suspicious lesions or rash. No jaundice.  CV: distal perfusion intact   Resp: " normal respiratory effort without conversational dyspnea   Psych: normal mood and affect  Gait: normal steady gait with appropriate coordination and balance   Neuro: Normal light sensory exam of  extremity         Cervical Spine Exam    Range of Motion:       forward flexion          extension         lateral rotation         lateral flexion   Grossly full, no pain      Special Tests:     neg (-) Spurling    Skin:     well perfused       capillary refill brisk    Lymphatics:      no edema noted in the upper extremities           Right Shoulder exam    ROM:      forward flexion lacking ~10-15 deg (to about 160)        abduction lacking 5-10 deg actively compared to left       internal rotation full       external rotation full  Some pain end of overhead motion, also end IR    Tender: none    Non Tender:     acromioclavicular joint       subacromial space       posterior shoulder    Strength:      abduction 5/5       internal rotation 5/5       external rotation 5/5    Impingement testing:      Mild pain end of Neer       Mild pain with Harris       neg (-) empty can       Pain with crossover       Pain with O'jj         RADIOLOGY:  I independently visualized and reviewed these images with the patient  Findings may represent impingement.    Results for orders placed or performed in visit on 10/05/21   MR Shoulder Right w/o Contrast    Narrative    EXAM: MR right shoulder without  contrast 10/6/2021 7:29 AM    TECHNIQUE: Multiplanar, multisequence imaging of the right shoulder  were obtained without administration of intravenous or intra-articular  gadolinium contrast using routine protocol.    History: Shoulder pain, rotator cuff disorder suspected, xray done;  Chronic right shoulder pain; Chronic right shoulder pain    Comparison: Radiograph dated 9/27/2021  Findings:    ROTATOR CUFF and ASSOCIATED STRUCTURES  Rotator cuff: Deep to the acromial there is faint edema this in the  distal supraspinatus and  infraspinatus tendon tendons and  musculotendinous junctions. Otherwise, the supraspinatus,  infraspinatus, teres minor and subscapularis tendons are intact.     Bursa: Minimal subacromial or subdeltoid bursal fluid.    Musculature: Muscle bulk of rotator cuff is preserved.  Deltoid muscle  bulk is also preserved.  No muscle edema.    Acromioclavicular joint  There are no significant degenerative changes of the acromioclavicular  joint. Acromion has a tiny downsloping tip in sagittal morphology  (series 6, image 8) and there is edema this in the musculotendinous  junction in this area (series 5, images 8 through 10 and series 8,  image 9). Overall the shape of the acromium is type 2 in sagittal  morphology.  Coracoacromial ligament is mildly thickened. The  acromiohumeral interval measures in the sagittal plane at narrow  locations 5 to 6 mm (series 7, image 1).     OSSEOUS STRUCTURES  No fracture, marrow contusion or marrow infiltration.    LONG BICIPITAL TENDON  The long head of the biceps tendon is normally situated within the  bicipital groove. No complete or partial biceps tendon tear is  present.    GLENOHUMERAL JOINT  Joint fluid: Physiologic amount of joint fluid is  present.    Cartilage and subarticular bone:  No focal hyaline cartilage defects  are noted. No Hill-Sachs, reverse Hill-Sachs, or bony Bankart lesions  are seen.    Labrum: Limited assessment on this study with relative lack of joint  distention shows no labral tear.      Impression    Impression:  1. Constellation of subtle findings of mild edema in the tendons and  musculotendinous junction of the supra- and infraspinatus fibers in  their subacromial course, relatively narrow acromiohumeral interval  measuring in its narrowest location 5 to 6 mm, mild thickening of the  coracoacromial ligament suggest a possible clinical syndrome of  subacromial impingement. Recommend clinical correlation.  2. Overall, the shape of the acromion is type II in  sagittal  morphology, however there is a very tiny distal downsloping seen as  described above (series 5 and 6, image 8).  3. Otherwise, intact rotator cuff. No evidence of tear. Preserved  muscle bulk.    JUTTA ELLERMANN, MD         SYSTEM ID:  [id number]             Examination:  XR SHOULDER 2 VIEW RIGHT     Date:  9/27/2021 11:12 AM      Clinical Information: Chronic right shoulder pain.     Comparison: none.                                                                      Impression:     1.  Normal right shoulder. No fracture. Normal joint alignment and  spacing.     NEFTALI MAGALLON MD

## 2022-02-01 NOTE — PATIENT INSTRUCTIONS
Plan to have you see one of my colleagues for an ultrasound-guided diagnostic injection (local anesthetic) into the areas in question the shoulder, subacromial bursa, as well as AC joint (likely both, but depending on response to the first).  This will help us better understand the particular pain generator in the shoulder, given MRI findings fit best with impingement, though the pain pattern appears more consistent with functional issues and possibly AC joint component.  Also plan return to physical therapy, referral placed.  You may wait to start the therapy until after the diagnostic injection, if desired.  Request contact clinic with an update in approximately 3 to 4 weeks, with response to therapy, sooner if needed.  Contact clinic sooner if any questions or concerns.    If you have any further questions for your physician or physician s care team you can call [phone] and use option 3 to leave a voice message. Calls received during business hours will be returned same day.

## 2022-02-01 NOTE — Clinical Note
2/1/2022         RE: Chago Soni  [address]  [facility] [address]        Dear Colleague,    Thank you for referring your patient, Chago Soni, to the [facility] [place]. Please see a copy of my visit note below.    ASSESSMENT & PLAN    Chago was seen today for pain.    Diagnoses and all orders for this visit:    Chronic right shoulder pain  -     Orthopedic  Referral  -     [facility]; Future      We discussed the following: symptom treatment, activity modification/rest, imaging, rehab, injection therapy and referral to ortho surgeon. Following discussion, plan:  Diagnostic injection with imaging guidance next, along with PT. We discussed potential for steroid injection as well, though he prefers to avoid, which is understandable.      See Patient Instructions  Patient Instructions   Plan to have you see one of my colleagues for an ultrasound-guided diagnostic injection (local anesthetic) into the areas in question the shoulder, subacromial bursa, as well as AC joint (likely both, but depending on response to the first).  This will help us better understand the particular pain generator in the shoulder, given MRI findings fit best with impingement, though the pain pattern appears more consistent with functional issues and possibly AC joint component.  Also plan return to physical therapy, referral placed.  You may wait to start the therapy until after the diagnostic injection, if desired.  Request contact clinic with an update in approximately 3 to 4 weeks, with response to therapy, sooner if needed.  Contact clinic sooner if any questions or concerns.    If you have any further questions for your physician or physician s care team you can call [phone] and use option 3 to leave a voice message. Calls received during business hours will be returned same day.        Prashanth Romero,   [facility]  [place]      CC: Dana Gotti      -----  Chief Complaint   Patient presents with     Right Shoulder - Pain       SUBJECTIVE  Chago Soni is a/an 26 year old male who is seen in consultation at the request of  Dana Gotti PA-C for evaluation of right shoulder pain.  Pain has been present for the past 3 years.  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 He did notice that there was an imbalance of his shoulder musculature with how his arm hung.  Pain currently in the anterior aspect of his shoulder, into his clavicle and behind his shoulder.     The patient is seen by themselves.  The patient is Right handed    Onset: 3 years(s) ago. Reports insidious onset without acute precipitating event.  Location of Pain: right shoulder   Worsened by: morning, certain activities   Better with: certain activities   Treatments tried: physical therapy , previous imaging (x rays, MRI) and chiropractic care   Associated symptoms: grinding, popping    Orthopedic/Surgical history: NO  Social History/Occupation: [employer]     No family history pertinent to patient's problem today.   **  Pain is more superior. Has tightness sensation anterosuperior shoulder, points inferior to distal clavicle.  Lately having some right periscapular and upper trap pain.  Not really any pain into upper arm.    Feels like shoulders are more protracted, feels like difficult to be able to do scap retraction. Feels like has lost ability to stretch that area.  Pain in neck/upper back area comes and goes.  Notes some noise with shoulder motion. Feels a pop at superior shoulder, points over AC joint.    Some benefit with PT end of 2020. However, some of the exercises from HE[facility] were challenging as he felt like it was hard to get into position (e.g., with scap retraction).        REVIEW OF SYSTEMS:  Review of Systems   All other systems reviewed and are  "negative.        OBJECTIVE:  /72   Ht 1.803 m (5' 11\")   Wt 83.9 kg (185 lb)   BMI 25.80 kg/m     General: healthy, alert and in no distress  HEENT: no scleral icterus or conjunctival erythema  Skin: no suspicious lesions or rash. No jaundice.  CV: distal perfusion intact   Resp: normal respiratory effort without conversational dyspnea   Psych: normal mood and affect  Gait: normal steady gait with appropriate coordination and balance   Neuro: Normal light sensory exam of  extremity         Cervical Spine Exam    Range of Motion:       forward flexion          extension         lateral rotation         lateral flexion   Grossly full, no pain      Special Tests:     neg (-) Spurling    Skin:     well perfused       capillary refill brisk    Lymphatics:      no edema noted in the upper extremities           Right Shoulder exam    ROM:      forward flexion lacking ~10-15 deg (to about 160)        abduction lacking 5-10 deg actively compared to left       internal rotation full       external rotation full  Some pain end of overhead motion, also end IR    Tender: none    Non Tender:     acromioclavicular joint       subacromial space       posterior shoulder    Strength:      abduction 5/5       internal rotation 5/5       external rotation 5/5    Impingement testing:      Mild pain end of Neer       Mild pain with Harris       neg (-) empty can       Pain with crossover       Pain with O'jj         RADIOLOGY:  I independently visualized and reviewed these images with the patient  Findings may represent impingement.    Results for orders placed or performed in visit on 10/05/21   MR Shoulder Right w/o Contrast    Narrative    EXAM: MR right shoulder without  contrast 10/6/2021 7:29 AM    TECHNIQUE: Multiplanar, multisequence imaging of the right shoulder  were obtained without administration of intravenous or intra-articular  gadolinium contrast using routine protocol.    History: Shoulder pain, rotator cuff " disorder suspected, xray done;  Chronic right shoulder pain; Chronic right shoulder pain    Comparison: Radiograph dated 9/27/2021  Findings:    ROTATOR CUFF and ASSOCIATED STRUCTURES  Rotator cuff: Deep to the acromial there is faint edema this in the  distal supraspinatus and infraspinatus tendon tendons and  musculotendinous junctions. Otherwise, the supraspinatus,  infraspinatus, teres minor and subscapularis tendons are intact.     Bursa: Minimal subacromial or subdeltoid bursal fluid.    Musculature: Muscle bulk of rotator cuff is preserved.  Deltoid muscle  bulk is also preserved.  No muscle edema.    Acromioclavicular joint  There are no significant degenerative changes of the acromioclavicular  joint. Acromion has a tiny downsloping tip in sagittal morphology  (series 6, image 8) and there is edema this in the musculotendinous  junction in this area (series 5, images 8 through 10 and series 8,  image 9). Overall the shape of the acromium is type 2 in sagittal  morphology.  Coracoacromial ligament is mildly thickened. The  acromiohumeral interval measures in the sagittal plane at narrow  locations 5 to 6 mm (series 7, image 1).     OSSEOUS STRUCTURES  No fracture, marrow contusion or marrow infiltration.    LONG BICIPITAL TENDON  The long head of the biceps tendon is normally situated within the  bicipital groove. No complete or partial biceps tendon tear is  present.    GLENOHUMERAL JOINT  Joint fluid: Physiologic amount of joint fluid is  present.    Cartilage and subarticular bone:  No focal hyaline cartilage defects  are noted. No Hill-Sachs, reverse Hill-Sachs, or bony Bankart lesions  are seen.    Labrum: Limited assessment on this study with relative lack of joint  distention shows no labral tear.      Impression    Impression:  1. Constellation of subtle findings of mild edema in the tendons and  musculotendinous junction of the supra- and infraspinatus fibers in  their subacromial course, relatively  narrow acromiohumeral interval  measuring in its narrowest location 5 to 6 mm, mild thickening of the  coracoacromial ligament suggest a possible clinical syndrome of  subacromial impingement. Recommend clinical correlation.  2. Overall, the shape of the acromion is type II in sagittal  morphology, however there is a very tiny distal downsloping seen as  described above (series 5 and 6, image 8).  3. Otherwise, intact rotator cuff. No evidence of tear. Preserved  muscle bulk.    JUTTA ELLERMANN, MD         SYSTEM ID:  [id number]             Examination:  XR SHOULDER 2 VIEW RIGHT     Date:  9/27/2021 11:12 AM      Clinical Information: Chronic right shoulder pain.     Comparison: none.                                                                      Impression:     1.  Normal right shoulder. No fracture. Normal joint alignment and  spacing.     NEFTALI MAGALLON MD         {[facility] 2021 Documentation (Optional):557327}  {2021 E&M time (Optional):234003}  {Provider  Link to [facility] Help Grid :[id number]}           Again, thank you for allowing me to participate in the care of your patient.        Sincerely,        Prashanth Romero DO

## 2022-02-01 NOTE — PROGRESS NOTES
"Physical Therapy Initial Evaluation  Therapist Impression: Chago is a 26 year old year old male referred to physical therapy by Dr. Prashanth Romero for treatment of chronic R shoulder pain. Patient presents to physical therapy with c/o R shoulder pain and tightness. Subjective history and objective findings are consistent with decreased R scapular ROM and muscular tightness w/ possible impingement syndrome. Due to these impairments, patient has difficulty with end ranges of motion, certain lifting and reaching activities. Patient will benefit from skilled PT emphasizing UE ROM and scapular strengthening to address impairments/limitations in order to reach patient's goals, facilitate return to prior level of function, and maximize participation.    KEY FINDINGS:  1. R pectoral tightness > L  2. Decreased R shoulder flexion and ABD ROM  3. Decreased R shoulder flexion strength    Subjective:  The history is provided by the patient. No  was used.   Therapist Generated HPI Evaluation  Problem details: Pt has been having R shoulder pain for several years, so specific injury that he knows of. Pt is active duty , is [employer]. Reports his shoulder feels rotated forward and \"feels stuck\". Feels that he can't retract scapula to get a stretch. Feels tightness starting in AC joint, going up into neck and down into pectorals. Notices decreased ROM with shoulder press, lateral raises, rows, generally any UB exercise. Works out most every day. Doesn't notice much pain with this, just the tightness. Feels pain more into neck and scapular region. .         Type of problem:  Right shoulder.    This is a chronic condition.  Condition occurred with:  Unknown cause.  Where condition occurred: for unknown reasons.  Site of Pain: shoulder blade.  Pain quality: tightness, soreness. and is constant (always some discomfort, up to 4/10).  Pain radiates to:  Cervical. Pain is worse in the A.M..  Since onset symptoms are " gradually worsening.  Associated symptoms:  Loss of motion/stiffness (clicking/grinding in what he thinks is AC joint). Exacerbated by: unable to point to specific thing.  and relieved by activity/movement (exercises, lifting).  Special tests included:  MRI (see above).  Previous treatment includes physical therapy. Improved with treatment: mildly helpful because he couldn't get the scapular retraction on R.  Restrictions due to condition include:  Working in normal job without restrictions.  Barriers include:  None as reported by patient.    Patient Health History         Pain is reported as 2/10 on pain scale.  General health as reported by patient is excellent.  Pertinent medical history includes: none.   Red flags:  None as reported by patient.  Medical allergies: none.   Surgeries include:  None.    Current medications:  None.    Current occupation is [employer].   Primary job tasks include:  Computer work and prolonged sitting.                                    Objective:  Standing Alignment:      Shoulder/UE:  Rounded shoulders (In supine, posterior acromion 4 cm off table L, 5 cm R. )                                       Shoulder Evaluation:  ROM:  AROM:  : Generally pt not experiencing pain w/ ROM, just tightness. Slight UT/neck pain with full ROM ABD.  Flexion:  Left:  180    Right:  160    Abduction:  Left: 180   Right:  150    Internal Rotation:  Left:  T7    Right:  T10  External Rotation:  Left:  40    Right:  40                PROM:  : Sleeper stretch ROM approx equal L and R   Flexion:  Right: 165                                Strength:    Flexion: Left:5/5   Pain:    Right: 5-/5     Pain:     Abduction:  Left: 5/5  Pain:    Right: 5/5     Pain:    Internal Rotation:  Left:5/5     Pain:    Right: 5/5     Pain:  External Rotation:   Left:5/5     Pain:   Right:5/5     Pain:        Elbow Flexion:  Left:5/5     Pain:    Right:5/5     Pain:  Elbow Extension:  Left:5/5     Pain:    Right:5-/5      Pain:  Stability Testing:        Right shoulder stability negative testing:  Apprehension  Special Tests:      Right shoulder positive for the following special tests:Impingement (+ HK, - Neers for pain, just tightness)  Right shoulder negative for the following special tests:Labral (difficulty resisting in palm up position); Rotator cuff tear and Acrimioclavicular  Palpation:  Palpation assessed shoulder: TTP R pectorals, coracoid process.    Right shoulder tenderness present at: Biceps; Supraspinatus; Upper Trap and Bicipital Groove  Right shoulder tenderness not present at:Clavicle; Acrimioclavicular; Infraspinatus or Teres Minor  Mobility Tests:  Mobility wnl shoulder: Initial decreased R scapular ROM with repeated scaption, though improved with continued motion.                                                 General     ROS    Assessment/Plan:    Patient is a 26 year old male with right side shoulder complaints.    Patient has the following significant findings with corresponding treatment plan.                Diagnosis 1:  R shoulder pain and tightness  Pain -  manual therapy, splint/taping/bracing/orthotics, self management, education and home program  Decreased ROM/flexibility - manual therapy, therapeutic exercise, therapeutic activity and home program  Decreased joint mobility - manual therapy, therapeutic exercise, therapeutic activity and home program  Decreased strength - therapeutic exercise, therapeutic activities and home program  Decreased function - therapeutic activities and home program  Impaired posture - neuro re-education, therapeutic activities and home program    Therapy Evaluation Codes:   Cumulative Therapy Evaluation is: Low complexity.    Previous and current functional limitations:  (See Goal Flow Sheet for this information)    Short term and Long term goals: (See Goal Flow Sheet for this information)     Communication ability:  Patient appears to be able to clearly communicate and  understand verbal and written communication and follow directions correctly.  Treatment Explanation - The following has been discussed with the patient:   RX ordered/plan of care  Anticipated outcomes  Possible risks and side effects  This patient would benefit from PT intervention to resume normal activities.   Rehab potential is excellent.    Frequency:  1 X week, once daily  Duration:  for 6 weeks  Discharge Plan:  Achieve all LTG.  Independent in home treatment program.  Reach maximal therapeutic benefit.    Please refer to the daily flowsheet for treatment today, total treatment time and time spent performing 1:1 timed codes.

## 2022-02-07 ENCOUNTER — THERAPY VISIT (OUTPATIENT)
Dept: PHYSICAL THERAPY | Facility: CLINIC | Age: 27
End: 2022-02-07
Payer: OTHER GOVERNMENT

## 2022-02-07 DIAGNOSIS — M25.511 CHRONIC RIGHT SHOULDER PAIN: Primary | ICD-10-CM

## 2022-02-07 DIAGNOSIS — G89.29 CHRONIC RIGHT SHOULDER PAIN: Primary | ICD-10-CM

## 2022-02-07 PROCEDURE — 97140 MANUAL THERAPY 1/> REGIONS: CPT | Mod: GP | Performed by: PHYSICAL THERAPIST

## 2022-02-07 PROCEDURE — 97110 THERAPEUTIC EXERCISES: CPT | Mod: GP | Performed by: PHYSICAL THERAPIST

## 2022-02-08 ENCOUNTER — OFFICE VISIT (OUTPATIENT)
Dept: ORTHOPEDICS | Facility: CLINIC | Age: 27
End: 2022-02-08
Payer: OTHER GOVERNMENT

## 2022-02-08 ENCOUNTER — VIRTUAL VISIT (OUTPATIENT)
Dept: DERMATOLOGY | Facility: CLINIC | Age: 27
End: 2022-02-08
Payer: OTHER GOVERNMENT

## 2022-02-08 DIAGNOSIS — G89.29 CHRONIC RIGHT SHOULDER PAIN: Primary | ICD-10-CM

## 2022-02-08 DIAGNOSIS — L71.9 ROSACEA: ICD-10-CM

## 2022-02-08 DIAGNOSIS — L71.9 ACNE ROSACEA: Primary | ICD-10-CM

## 2022-02-08 DIAGNOSIS — M25.511 CHRONIC RIGHT SHOULDER PAIN: Primary | ICD-10-CM

## 2022-02-08 PROCEDURE — 99207 PR NO CHARGE LOS: CPT | Performed by: FAMILY MEDICINE

## 2022-02-08 PROCEDURE — 99214 OFFICE O/P EST MOD 30 MIN: CPT | Mod: 95 | Performed by: DERMATOLOGY

## 2022-02-08 RX ORDER — DOXYCYCLINE 100 MG/1
100 CAPSULE ORAL 2 TIMES DAILY
Qty: 60 CAPSULE | Refills: 1 | Status: SHIPPED | OUTPATIENT
Start: 2022-02-08 | End: 2022-07-28

## 2022-02-08 NOTE — LETTER
2/8/2022         RE: Chago Soni  [address]  [place] [facility] [place] [address]        Dear Colleague,    Thank you for referring your patient, Chago Soni, to the [facility] [place]. Please see a copy of my visit note below.    Patient had booster 2 days ago, will delay steroid injection for 2 weeks.    Ming Dale MD         Again, thank you for allowing me to participate in the care of your patient.        Sincerely,        Ming Dale MD

## 2022-02-08 NOTE — NURSING NOTE
Dermatology Rooming Note    Chago Soni's goals for this visit include:   Chief Complaint   Patient presents with     Derm Problem     Acneiform eruption on the nose - improved but still flares     Alisha Murguia, CMA

## 2022-02-08 NOTE — LETTER
2/8/2022       RE: Chago Soni  [address]  [place] [facility] [place] [address]     Dear Colleague,    Thank you for referring your patient, Chago Soni, to the [facility] [place] at [facility]. Please see a copy of my visit note below.    [facility] Dermatology Note  Encounter Date: Feb 8, 2022  [employer] and Telephone ([phone]). Location of teledermatologist: [facility] [place].  Start time: 1:10 PM. End time: 1:18 PM.    Dermatology Problem List:  1. Acneiform eruption on the nose - favor rosacea.    - Start doxycycline 100 mg PO BID   - Continue metronidazole 0.75% gel topically to nose   - Start tacrolimus 0.1% ointment once daily to nose and eyelids   - Start using vaseline prn for eye and mouth lesions; discontinue using aquaphor  2.  Eyelid dermatitis. Ddx irritant/contact +/- rosacea/periorificial dermatitis  - doxycycline as above   - Start tacrolimus 0.1% ointment once daily to nose and eyelids   - Start using vaseline prn for eye and mouth lesions; discontinue using aquaphor  ____________________________________________    Assessment & Plan:     # Acneiform eruption, bilateral nose. Chronic, flare.   Given distribution and associated flushing, suspect ET and papulopustular rosacea +/- secondary impetiginization. Recommended trial of oral and topical regimen as below. Consider switch to doxycycline at submicrobial dosing at follow-up if improved.   - Start doxycycline 100 mg PO BID; side effects of GI upset and photosensitivity discussed  - Start metronidazole 0.75% cream once  - Consider transition to doxycycline 40 mg PO ER or 20 mg PO BID at sub-microbial dosing at follow-up    Procedures Performed:    None    Follow-up: 6 week(s) in-person, or earlier for new or changing lesions    Staff:     Dieter Hinds MD  Pronouns: he/him/his    Department  "of Dermatology  [facility]: Phone: [phone], Fax:[phone]  [facility]: Phone: [phone] Fax: [phone]    ____________________________________________    CC: Derm Problem (Acneiform eruption on the nose - improved but still flares)    HPI:  Mr. Chago Soni is a(n) 26 year old male who presents today as a return patient for acneiform eruption on the nose. Last seen on 3/22/21 when started on doxycycline 100 mg PO BID and topical therapies including metronidazole 0.75% gel and protopic 0.1% ointment.     He reports recent flare of \"ulcer\" on his nose. He states this comes and goes and is typicaly associated with redness/flushing on the nose. Has responded well in the past to oral antibiotics. Currently, using mupirocin ointment to the area without much improvement.     Patient is otherwise feeling well, without additional skin concerns.    Labs Reviewed:  N/A    Physical Exam:  Vitals: There were no vitals taken for this visit.  SKIN: Teledermatology photos were reviewed; image quality and interpretability: acceptable. Image date: 2/8/22.  - Yellow, crusted papule on the right nose with associated surrounding pink erythema.   - No other lesions of concern on areas examined.                     "

## 2022-02-08 NOTE — PATIENT INSTRUCTIONS
[facility] Dermatology Visit    Thank you for allowing us to participate in your care. Your findings, instructions and follow-up plan are as follows:     Eruption on nose.   1. Start doxycycline 100 mg twice daily until next visit (see side effects below).   2. Start metronidazole 0.75% cream once daily.   3. Follow-up in 6 weeks.           When should I call my doctor?    If you are worsening or not improving, please, contact us or seek urgent care as noted below.     Who should I call with questions (adults)?    [facility] (adult and pediatric): [phone]    [facility] (adult): [phone]    For urgent needs outside of business hours call the [facility] at [phone] and ask for the dermatology resident on call    If this is a medical emergency and you are unable to reach an ER, Call [phone]    Who should I call with questions (pediatric)?  [facility]- Pediatric Dermatology  Dr. Caren Rolle, Dr. Sveta Oneill, Dr. Rupa Gutierrez, Teresa Molina, PA  Dr. Eulalia Cardoso, Dr. Catalina Jon & Dr. Domenico Anderson  Non Urgent  Nurse Triage Line; [phone]- Gisselle and Edith RN Care Coordinators   Erika (/Complex ) [phone]    If you need a prescription refill, please contact your pharmacy. Refills are approved or denied by our physicians during normal business hours, Monday through Fridays  Per office policy, refills will not be granted if you have not been seen within the past year (or sooner depending on your child's condition).    Scheduling Information:  Pediatric Appointment Scheduling and Call Center [phone]  Radiology Scheduling- [phone]  Sedation Unit Scheduling- [phone]  [place] Scheduling- General [phone]; Pediatric Dermatology [phone]  Main  Services: [phone]  Maltese: [phone]  Rebecca:  [phone]  Elodia/Trinidadian/Nepalese: [phone]  Preadmission Nursing Department Fax Number: [phone] (fax all pre-operative paperwork to this number)    For urgent matters arising during evenings, weekends, or holidays that cannot wait for normal business hours please call [phone] and ask for the dermatology resident on call to be paged.

## 2022-02-09 PROCEDURE — 88305 TISSUE EXAM BY PATHOLOGIST: CPT | Performed by: PATHOLOGY

## 2022-02-10 ENCOUNTER — LAB REQUISITION (OUTPATIENT)
Dept: LAB | Facility: CLINIC | Age: 27
End: 2022-02-10

## 2022-02-10 DIAGNOSIS — K22.2 ESOPHAGEAL OBSTRUCTION: ICD-10-CM

## 2022-02-10 DIAGNOSIS — K26.9 DUODENAL ULCER, UNSPECIFIED AS ACUTE OR CHRONIC, WITHOUT HEMORRHAGE OR PERFORATION: ICD-10-CM

## 2022-02-10 DIAGNOSIS — K31.89 OTHER DISEASES OF STOMACH AND DUODENUM: ICD-10-CM

## 2022-02-10 DIAGNOSIS — K30 FUNCTIONAL DYSPEPSIA: ICD-10-CM

## 2022-02-10 DIAGNOSIS — K92.2 GASTROINTESTINAL HEMORRHAGE, UNSPECIFIED: ICD-10-CM

## 2022-02-10 DIAGNOSIS — K21.00 GASTRO-ESOPHAGEAL REFLUX DISEASE WITH ESOPHAGITIS, WITHOUT BLEEDING: ICD-10-CM

## 2022-02-10 DIAGNOSIS — R13.10 DYSPHAGIA, UNSPECIFIED: ICD-10-CM

## 2022-02-11 LAB
PATH REPORT.COMMENTS IMP SPEC: NORMAL
PATH REPORT.COMMENTS IMP SPEC: NORMAL
PATH REPORT.FINAL DX SPEC: NORMAL
PATH REPORT.GROSS SPEC: NORMAL
PATH REPORT.MICROSCOPIC SPEC OTHER STN: NORMAL
PATH REPORT.RELEVANT HX SPEC: NORMAL
PHOTO IMAGE: NORMAL

## 2022-02-21 ENCOUNTER — THERAPY VISIT (OUTPATIENT)
Dept: PHYSICAL THERAPY | Facility: CLINIC | Age: 27
End: 2022-02-21
Payer: OTHER GOVERNMENT

## 2022-02-21 DIAGNOSIS — G89.29 CHRONIC RIGHT SHOULDER PAIN: Primary | ICD-10-CM

## 2022-02-21 DIAGNOSIS — M25.511 CHRONIC RIGHT SHOULDER PAIN: Primary | ICD-10-CM

## 2022-02-21 PROCEDURE — 97112 NEUROMUSCULAR REEDUCATION: CPT | Mod: GP | Performed by: PHYSICAL THERAPIST

## 2022-02-21 PROCEDURE — 97140 MANUAL THERAPY 1/> REGIONS: CPT | Mod: GP | Performed by: PHYSICAL THERAPIST

## 2022-02-21 PROCEDURE — 97110 THERAPEUTIC EXERCISES: CPT | Mod: GP | Performed by: PHYSICAL THERAPIST

## 2022-02-23 ENCOUNTER — E-VISIT (OUTPATIENT)
Dept: FAMILY MEDICINE | Facility: CLINIC | Age: 27
End: 2022-02-23
Payer: OTHER GOVERNMENT

## 2022-02-23 DIAGNOSIS — R41.840 POOR CONCENTRATION: Primary | ICD-10-CM

## 2022-02-23 PROCEDURE — 99422 OL DIG E/M SVC 11-20 MIN: CPT | Performed by: PHYSICIAN ASSISTANT

## 2022-02-23 RX ORDER — ATOMOXETINE 40 MG/1
40 CAPSULE ORAL DAILY
Qty: 30 CAPSULE | Refills: 1 | Status: SHIPPED | OUTPATIENT
Start: 2022-02-23 | End: 2022-07-06

## 2022-02-28 ENCOUNTER — ALLIED HEALTH/NURSE VISIT (OUTPATIENT)
Dept: ALLERGY | Facility: CLINIC | Age: 27
End: 2022-02-28
Payer: OTHER GOVERNMENT

## 2022-02-28 DIAGNOSIS — J30.89 ALLERGIC RHINITIS DUE TO DUST MITE: ICD-10-CM

## 2022-02-28 DIAGNOSIS — J30.81 ALLERGIC RHINITIS DUE TO ANIMALS: ICD-10-CM

## 2022-02-28 DIAGNOSIS — J30.89 ALLERGIC RHINITIS DUE TO MOLD: ICD-10-CM

## 2022-02-28 DIAGNOSIS — J30.1 SEASONAL ALLERGIC RHINITIS DUE TO POLLEN: Primary | ICD-10-CM

## 2022-02-28 PROCEDURE — 95117 IMMUNOTHERAPY INJECTIONS: CPT

## 2022-02-28 PROCEDURE — 99207 PR DROP WITH A PROCEDURE: CPT

## 2022-03-03 ENCOUNTER — OFFICE VISIT (OUTPATIENT)
Dept: ORTHOPEDICS | Facility: CLINIC | Age: 27
End: 2022-03-03
Payer: OTHER GOVERNMENT

## 2022-03-03 DIAGNOSIS — G89.29 CHRONIC RIGHT SHOULDER PAIN: Primary | ICD-10-CM

## 2022-03-03 DIAGNOSIS — M25.511 CHRONIC RIGHT SHOULDER PAIN: Primary | ICD-10-CM

## 2022-03-03 PROCEDURE — 20611 DRAIN/INJ JOINT/BURSA W/US: CPT | Mod: RT | Performed by: FAMILY MEDICINE

## 2022-03-03 RX ORDER — BETAMETHASONE SODIUM PHOSPHATE AND BETAMETHASONE ACETATE 3; 3 MG/ML; MG/ML
6 INJECTION, SUSPENSION INTRA-ARTICULAR; INTRALESIONAL; INTRAMUSCULAR; SOFT TISSUE
Status: SHIPPED | OUTPATIENT
Start: 2022-03-03

## 2022-03-03 RX ORDER — ROPIVACAINE HYDROCHLORIDE 5 MG/ML
4 INJECTION, SOLUTION EPIDURAL; INFILTRATION; PERINEURAL
Status: SHIPPED | OUTPATIENT
Start: 2022-03-03

## 2022-03-03 RX ADMIN — ROPIVACAINE HYDROCHLORIDE 4 ML: 5 INJECTION, SOLUTION EPIDURAL; INFILTRATION; PERINEURAL at 14:50

## 2022-03-03 RX ADMIN — BETAMETHASONE SODIUM PHOSPHATE AND BETAMETHASONE ACETATE 6 MG: 3; 3 INJECTION, SUSPENSION INTRA-ARTICULAR; INTRALESIONAL; INTRAMUSCULAR; SOFT TISSUE at 14:50

## 2022-03-03 NOTE — LETTER
3/3/2022         RE: Chago Soni  [address]  [place] [facility] [address]        Dear Colleague,    Thank you for referring your patient, Chago Soni, to the [facility] [place]. Please see a copy of my visit note below.    Large Joint Injection/Arthocentesis: R subacromial bursa    Date/Time: 3/3/2022 2:50 PM  Performed by: Ming Dale MD  Authorized by: Ming Dale MD     Indications:  Pain  Needle Size:  25 G  Guidance: ultrasound    Approach:  Lateral  Location:  Shoulder      Site:  R subacromial bursa  Medications:  6 mg betamethasone acet & sod phos 6 (3-3) MG/ML; 4 mL ropivacaine 5 MG/ML  Medications comment:  Actual amount of ropivacaine used 4 mL  Outcome:  Tolerated well, no immediate complications  Procedure discussed: discussed risks, benefits, and alternatives    Consent Given by:  Patient  Timeout: timeout called immediately prior to procedure    Prep: patient was prepped and draped in usual sterile fashion     Patient reported improvement of pain after the numbing portion of right subacromial bursa steroid injection.  Ultrasound guided images were permanently stored on ultrasound machine.  Aftercare instructions given to patient.  Plan to follow-up as previously discussed with referring provider.     Ming Dale MD [facility]              Again, thank you for allowing me to participate in the care of your patient.        Sincerely,        Ming Dale MD

## 2022-03-03 NOTE — PATIENT INSTRUCTIONS
[facility] Injection Discharge Instructions    Procedure: Right Subacromial Steroid Injection        You may shower, however avoid swimming, tub baths or hot tubs for 24 hours following your procedure    You may have a mild to moderate increase in pain for several days following the injection.    It may take up to 14 days for the steroid medication to start working although you may feel the effect as early as a few days after the procedure.    You may use ice packs for 10-15 minutes, 3 to 4 times a day at the injection site for comfort    You may use anti-inflammatory medications (such as Ibuprofen or Aleve or Advil) or Tylenol for pain control if necessary    If you were fasting, you may resume your normal diet and medications after the procedure    If you have diabetes, check your blood sugar more frequently than usual as your blood sugar may be higher than normal for 10-14 days following a steroid injection. Contact your doctor who manages your diabetes if your blood sugar is higher than usual      If you experience any of the following, call [facility] @ [phone] or [phone]  -Fever over 100 degree F  -Swelling, bleeding, redness, drainage, warmth at the injection site  - New or worsening pain      It was great seeing you again today!    Ming Dale

## 2022-03-03 NOTE — PROGRESS NOTES
Large Joint Injection/Arthocentesis: R subacromial bursa    Date/Time: 3/3/2022 2:50 PM  Performed by: Ming Dale MD  Authorized by: Ming Dale MD     Indications:  Pain  Needle Size:  25 G  Guidance: ultrasound    Approach:  Lateral  Location:  Shoulder      Site:  R subacromial bursa  Medications:  6 mg betamethasone acet & sod phos 6 (3-3) MG/ML; 4 mL ropivacaine 5 MG/ML  Medications comment:  Actual amount of ropivacaine used 4 mL  Outcome:  Tolerated well, no immediate complications  Procedure discussed: discussed risks, benefits, and alternatives    Consent Given by:  Patient  Timeout: timeout called immediately prior to procedure    Prep: patient was prepped and draped in usual sterile fashion     Patient reported improvement of pain after the numbing portion of right subacromial bursa steroid injection.  Ultrasound guided images were permanently stored on ultrasound machine.  Aftercare instructions given to patient.  Plan to follow-up as previously discussed with referring provider.     Ming Dale MD [facility]

## 2022-03-10 ENCOUNTER — THERAPY VISIT (OUTPATIENT)
Dept: PHYSICAL THERAPY | Facility: CLINIC | Age: 27
End: 2022-03-10
Payer: OTHER GOVERNMENT

## 2022-03-10 DIAGNOSIS — M25.511 CHRONIC RIGHT SHOULDER PAIN: Primary | ICD-10-CM

## 2022-03-10 DIAGNOSIS — G89.29 CHRONIC RIGHT SHOULDER PAIN: Primary | ICD-10-CM

## 2022-03-10 DIAGNOSIS — M25.511 RIGHT SHOULDER PAIN, UNSPECIFIED CHRONICITY: ICD-10-CM

## 2022-03-10 PROCEDURE — 97110 THERAPEUTIC EXERCISES: CPT | Mod: GP

## 2022-03-10 PROCEDURE — 97140 MANUAL THERAPY 1/> REGIONS: CPT | Mod: GP

## 2022-03-16 ENCOUNTER — VIRTUAL VISIT (OUTPATIENT)
Dept: FAMILY MEDICINE | Facility: CLINIC | Age: 27
End: 2022-03-16
Payer: OTHER GOVERNMENT

## 2022-03-16 DIAGNOSIS — F51.3 SLEEP WALKING: ICD-10-CM

## 2022-03-16 PROCEDURE — 99213 OFFICE O/P EST LOW 20 MIN: CPT | Mod: 95 | Performed by: INTERNAL MEDICINE

## 2022-03-16 NOTE — PROGRESS NOTES
Ross is a 26 year old who is being evaluated via a billable video visit.      How would you like to obtain your AVS? MyChart  If the video visit is dropped, the invitation should be resent by: Text to cell phone: [phone]  Will anyone else be joining your video visit? No    Video Start Time: 9:22 AM        Subjective   Ross is a 26 year old who presents for the following health issues     HPI     Sleep walking   Chago Soni has had sleep walking or non-REM parasomnia.  He has had increased frequency of these events over the past few months.  He is sleeping more often at his girlfriends home.  He at times will wake himself up.  He has since set up a camera which has documented. He does have a good sleep routine avoiding screens.  He does not take any sleep aides.  He does not drink alcohol most nights.      Review of Systems   Constitutional, HEENT, cardiovascular, pulmonary, GI, , musculoskeletal, neuro, skin, endocrine and psych systems are negative, except as otherwise noted.      Objective           Vitals:  No vitals were obtained today due to virtual visit.    Physical Exam   GENERAL: Healthy, alert and no distress  EYES: Eyes grossly normal to inspection.  No discharge or erythema, or obvious scleral/conjunctival abnormalities.  RESP: No audible wheeze, cough, or visible cyanosis.  No visible retractions or increased work of breathing.    SKIN: Visible skin clear. No significant rash, abnormal pigmentation or lesions.  NEURO: Cranial nerves grossly intact.  Mentation and speech appropriate for age.  PSYCH: Mentation appears normal, affect normal/bright, judgement and insight intact, normal speech and appearance well-groomed.    (F51.3) Sleep walking  Comment: referral to sleep clinic placed.  Discussed continued practice of good sleep hygeine  Plan: Adult Sleep Eval & Management          Referral                   Video-Visit Details    Type of service:  Video Visit    Video End Time:9:40  AM    Originating Location (pt. Location): Home    Distant Location (provider location):  [facility]     Platform used for Video Visit: Loren

## 2022-03-25 ENCOUNTER — THERAPY VISIT (OUTPATIENT)
Dept: PHYSICAL THERAPY | Facility: CLINIC | Age: 27
End: 2022-03-25
Payer: OTHER GOVERNMENT

## 2022-03-25 DIAGNOSIS — M25.511 CHRONIC RIGHT SHOULDER PAIN: ICD-10-CM

## 2022-03-25 DIAGNOSIS — G89.29 CHRONIC RIGHT SHOULDER PAIN: ICD-10-CM

## 2022-03-25 PROCEDURE — 97140 MANUAL THERAPY 1/> REGIONS: CPT | Mod: GP

## 2022-03-25 PROCEDURE — 97110 THERAPEUTIC EXERCISES: CPT | Mod: GP

## 2022-03-28 ENCOUNTER — ALLIED HEALTH/NURSE VISIT (OUTPATIENT)
Dept: ALLERGY | Facility: CLINIC | Age: 27
End: 2022-03-28
Payer: OTHER GOVERNMENT

## 2022-03-28 DIAGNOSIS — J30.1 SEASONAL ALLERGIC RHINITIS DUE TO POLLEN: Primary | ICD-10-CM

## 2022-03-28 PROCEDURE — 95117 IMMUNOTHERAPY INJECTIONS: CPT

## 2022-05-13 NOTE — PROGRESS NOTES
Discharge Note    Progress reporting period is from initial eval   to Mar 25, 2022.    Chago failed to follow up and current status is unknown.  Please see information below for last relevant information on current status.  Patient seen for 5 visits.    SUBJECTIVE  Subjective changes noted by patient:  Pt reports that things have been going well. Mornings he will still feel stiff or if he is standing for a long time.   .  Current pain level is  .     Previous pain level was  4/10 (at worst).   Changes in function:  Yes (See Goal flowsheet attached for changes in current functional level)  Adverse reaction to treatment or activity: None    OBJECTIVE  Changes noted in objective findings: AROM  deg,  deg beginning of session. Posterior acromion 4 cm on L, 5 on R.      ASSESSMENT/PLAN  Diagnosis: R shoulder pain   Updated problem list and treatment plan:   Pain - HEP  Decreased ROM/flexibility - HEP  STG/LTGs have been met or progress has been made towards goals:  Yes, please see goal flowsheet for most current information  Assessment of Progress: current status is unknown.    Last current status: Pt is progressing as expected   Self Management Plans:  HEP  I have re-evaluated this patient and find that the nature, scope, duration and intensity of the therapy is appropriate for the medical condition of the patient.  Chago continues to require the following intervention to meet STG and LTG's:  HEP.    Recommendations:  Discharge with current home program.  Patient to follow up with MD as needed.    Please refer to the daily flowsheet for treatment today, total treatment time and time spent performing 1:1 timed codes.

## 2022-07-06 ENCOUNTER — VIRTUAL VISIT (OUTPATIENT)
Dept: FAMILY MEDICINE | Facility: CLINIC | Age: 27
End: 2022-07-06
Payer: OTHER GOVERNMENT

## 2022-07-06 DIAGNOSIS — R41.840 POOR CONCENTRATION: Primary | ICD-10-CM

## 2022-07-06 PROCEDURE — 99214 OFFICE O/P EST MOD 30 MIN: CPT | Mod: 95 | Performed by: NURSE PRACTITIONER

## 2022-07-06 RX ORDER — BUPROPION HYDROCHLORIDE 150 MG/1
150 TABLET ORAL EVERY MORNING
Qty: 30 TABLET | Refills: 1 | Status: SHIPPED | OUTPATIENT
Start: 2022-07-06 | End: 2022-07-28

## 2022-07-06 ASSESSMENT — ANXIETY QUESTIONNAIRES
IF YOU CHECKED OFF ANY PROBLEMS ON THIS QUESTIONNAIRE, HOW DIFFICULT HAVE THESE PROBLEMS MADE IT FOR YOU TO DO YOUR WORK, TAKE CARE OF THINGS AT HOME, OR GET ALONG WITH OTHER PEOPLE: SOMEWHAT DIFFICULT
5. BEING SO RESTLESS THAT IT IS HARD TO SIT STILL: NEARLY EVERY DAY
GAD7 TOTAL SCORE: 4
3. WORRYING TOO MUCH ABOUT DIFFERENT THINGS: NOT AT ALL
7. FEELING AFRAID AS IF SOMETHING AWFUL MIGHT HAPPEN: NOT AT ALL
2. NOT BEING ABLE TO STOP OR CONTROL WORRYING: NOT AT ALL
GAD7 TOTAL SCORE: 4
6. BECOMING EASILY ANNOYED OR IRRITABLE: NOT AT ALL
1. FEELING NERVOUS, ANXIOUS, OR ON EDGE: NOT AT ALL

## 2022-07-06 ASSESSMENT — PATIENT HEALTH QUESTIONNAIRE - PHQ9
SUM OF ALL RESPONSES TO PHQ QUESTIONS 1-9: 9
5. POOR APPETITE OR OVEREATING: SEVERAL DAYS

## 2022-07-06 NOTE — PROGRESS NOTES
"Ross is a 27 year old who is being evaluated via a billable video visit.      How would you like to obtain your AVS? MyChart  If the video visit is dropped, the invitation should be resent by: Text to cell phone: [phone]  Will anyone else be joining your video visit? No          Assessment & Plan     (T82.067) Poor concentration  (primary encounter diagnosis)  Comment: advised non stimulant med choices for concentration, Bupropion off lable, he would like to trial and f/u in 2 months. Use/risk/benefit counseled. Advised he set up neuro psych testing in the future, referral placed; advised he set up counselor/therapist to learn how behavior mod can help with focus  Plan: Adult Mental Health  Referral,         buPROPion (WELLBUTRIN XL) 150 MG 24 hr tablet,         Adult Mental Health  Referral                       BMI:   Estimated body mass index is 25.8 kg/m  as calculated from the following:    Height as of 2/1/22: 1.803 m (5' 11\").    Weight as of 2/1/22: 83.9 kg (185 lb).           No follow-ups on file.    Annette Briseno NP  [facility]   Ross is a 27 year old presenting for the following health issues: mental health issues, no previous mental health treatment  No chief complaint on file.    No hx of ADHD testing done, is booking out 'super far' till November so hasn't set up appt. He works in the , is doing more administrative work and finding it hard to concentrated.   This is affecting his work, focus is so difficult he has postponed going back to school.  Has restlessness, trouble focusing. Denies hx of eval or need for depression or anxiety treatment in the past. Has tried to have ADHD meds ordered by multiple providers but was deferred due to camilo for neuro psych testing. Tried Straterra, didn't work    GAD7 score: 4  PHQ9 score: 9    Total Score: 9         HPI           Review of Systems         Objective           Vitals:  No vitals " were obtained today due to virtual visit.    Physical Exam   GENERAL: Healthy, alert and no distress  EYES: Eyes grossly normal to inspection.  No discharge or erythema, or obvious scleral/conjunctival abnormalities.  RESP: No audible wheeze, cough, or visible cyanosis.  No visible retractions or increased work of breathing.    SKIN: Visible skin clear. No significant rash, abnormal pigmentation or lesions.  NEURO: Cranial nerves grossly intact.  Mentation and speech appropriate for age.  PSYCH: Mentation appears normal, affect normal/bright, judgement and insight intact, normal speech and appearance well-groomed.                Video-Visit Details    Video Start Time: 0900    Type of service:  Video Visit    Video End Time:9:22 AM    Originating Location (pt. Location): Home    Distant Location (provider location):  [facility]     Platform used for Video Visit: Salsify    Hemanth Bright

## 2022-07-06 NOTE — COMMUNITY RESOURCES LIST (ENGLISH)
07/06/2022   [facility] - Outpatient Clinics  N/A  For questions about this resource list or additional care needs, please contact your primary care clinic or care manager.  Phone: [phone]   Email: N/A   Address: [address] [place] [id number]   Hours: N/A        Hotlines and Helplines       Hotline - Crisis help  1  [employer]. - 24-Hour Helpline Distance: 2.18 miles      COVID-19 Status: Regular Operations   [address] [address] [place] [address]  Language: Maori, English, Hmong, Lithuanian, Tamazight  Hours: Mon - Sun Open 24 Hours   Phone: [phone]) [phone] Email: [email or web address]@[employer] Website: http://[employer]     2  [employer] Distance: 2.35 miles      COVID-19 Status: Phone/Virtual   [address] [facility] [address] [place] [address]  Language: English  Hours: Mon - Sun Open 24 Hours   Phone: ([phone]) [phone] Website: [email or web address]          Mental Health       Individual counseling  3  [facility] Distance: 0.49 miles      COVID-19 Status: Regular Operations, COVID-19 Status: Phone/Virtual   [address] [facility] [address] [address]  Language: English  Hours: Mon - Fri 8:00 AM - 7:00 PM  Fees: Insurance, Self Pay, Sliding Fee   Phone: (539) [phone] Email: Yi@[employer] Website: https://[employer]/     4  [place] [employer] Distance: 0.94 miles      COVID-19 Status: Phone/Virtual   [address] [facility] [address] [place] [address]  Language: English, Urdu, Tamazight  Hours: Mon 11:00 AM - 8:00 PM , Tue 12:00 PM - 8:00 PM , Wed 11:00 AM - 3:30 PM , Thu 11:00 AM - 8:00 PM , Fri 11:00 AM - 3:30 PM  Fees: Insurance, Self Pay   Phone: ([phone] [phone] Ext.108 Email: changes@[employer] Website: http://[employer]     Mental health crisis care  5  [facility] Mobile Crisis Services Distance: 2.35 miles       COVID-19 Status: Regular Operations, COVID-19 Status: Phone/Virtual   [address] [place] [address]  Language: English  Hours: Mon - Sun Open 24 Hours  Fees: Insurance, Self Pay   Phone: (619) [phone] Website: [email or web address]/location/chester/     6  [facility] Acute [facility]iat[facility] Distance: 10.4 miles      COVID-19 Status: Regular Operations   [address] [place] [address]  Language: English  Hours: Mon - Sun Open 24 Hours  Fees: Insurance, Self Pay, Sliding Fee   Phone: (397) 675-7452 Website: [email or web address]://www.[facility].[email or web address]/specialty/psychiatry/acute-psychiatry-services/     Mental health support group  7  [facility] - [facility] Distance: 1.03 miles      COVID-19 Status: Phone/Virtual   [address] [facility] [place] [address]  Language: English  Hours: Mon - Fri 9:00 AM - 3:00 PM  Fees: Free   Phone: (666) [phone] Website: [email or web address]     8  [facility] ([facility] Distance: 9.93 miles      COVID-19 Status: Regular Operations, COVID-19 Status: Phone/Virtual   [address] [facility] [address] [address] [place] [address]  Language: English, Kirk, Armenian  Hours: Mon - Fri 9:00 AM - 5:00 PM  Fees: Free   Phone: (321) 740-5606 Email: [email or web address] Website: [email or web address]://www.[employer].com/[employer].org/          Important Numbers & Websites       Emergency Services   911  City Services   311  Poison Control   (350) 542-1927  Suicide Prevention Lifeline   (587) 384-7651 (TALK)  Child Abuse Hotline   (342) 100-8531 (4-A-Child)  Sexual Assault Hotline   (978) 959-8853 (HOPE)  National Runaway Safeline   (523) 885-4602 (RUNAWAY)  All-Options Talkline   (990) 918-3632  Substance Abuse Referral   (581) 900-5719 (HELP)

## 2022-07-28 ENCOUNTER — OFFICE VISIT (OUTPATIENT)
Dept: SLEEP MEDICINE | Facility: CLINIC | Age: 27
End: 2022-07-28
Attending: INTERNAL MEDICINE
Payer: OTHER GOVERNMENT

## 2022-07-28 VITALS
HEART RATE: 56 BPM | BODY MASS INDEX: 25.32 KG/M2 | SYSTOLIC BLOOD PRESSURE: 118 MMHG | WEIGHT: 180.9 LBS | HEIGHT: 71 IN | DIASTOLIC BLOOD PRESSURE: 77 MMHG | OXYGEN SATURATION: 100 %

## 2022-07-28 DIAGNOSIS — G47.50 PARASOMNIA, UNSPECIFIED TYPE: ICD-10-CM

## 2022-07-28 DIAGNOSIS — F51.3 SLEEP WALKING: ICD-10-CM

## 2022-07-28 DIAGNOSIS — G47.00 INSOMNIA, UNSPECIFIED TYPE: ICD-10-CM

## 2022-07-28 DIAGNOSIS — G47.9 SLEEP DISTURBANCE: Primary | ICD-10-CM

## 2022-07-28 PROBLEM — L71.9 ROSACEA: Status: RESOLVED | Noted: 2021-02-16 | Resolved: 2022-07-28

## 2022-07-28 PROCEDURE — 99204 OFFICE O/P NEW MOD 45 MIN: CPT | Performed by: NURSE PRACTITIONER

## 2022-07-28 ASSESSMENT — SLEEP AND FATIGUE QUESTIONNAIRES
HOW LIKELY ARE YOU TO NOD OFF OR FALL ASLEEP WHILE SITTING AND READING: WOULD NEVER DOZE
HOW LIKELY ARE YOU TO NOD OFF OR FALL ASLEEP WHILE SITTING INACTIVE IN A PUBLIC PLACE: WOULD NEVER DOZE
HOW LIKELY ARE YOU TO NOD OFF OR FALL ASLEEP WHILE LYING DOWN TO REST IN THE AFTERNOON WHEN CIRCUMSTANCES PERMIT: MODERATE CHANCE OF DOZING
HOW LIKELY ARE YOU TO NOD OFF OR FALL ASLEEP IN A CAR, WHILE STOPPED FOR A FEW MINUTES IN TRAFFIC: WOULD NEVER DOZE
HOW LIKELY ARE YOU TO NOD OFF OR FALL ASLEEP WHILE SITTING AND TALKING TO SOMEONE: WOULD NEVER DOZE
HOW LIKELY ARE YOU TO NOD OFF OR FALL ASLEEP WHILE WATCHING TV: SLIGHT CHANCE OF DOZING
HOW LIKELY ARE YOU TO NOD OFF OR FALL ASLEEP WHILE SITTING QUIETLY AFTER LUNCH WITHOUT ALCOHOL: WOULD NEVER DOZE
HOW LIKELY ARE YOU TO NOD OFF OR FALL ASLEEP WHEN YOU ARE A PASSENGER IN A CAR FOR AN HOUR WITHOUT A BREAK: WOULD NEVER DOZE

## 2022-07-28 NOTE — PATIENT INSTRUCTIONS
MAY EXTEND IN FUTURE. Your BMI is Body mass index is 25.23 kg/m .    What is BMI?  Body mass index (BMI) is one way to tell whether you are at a healthy weight, overweight, or obese. It measures your weight in relation to your height.  A BMI of 18.5 to 24.9 is in the healthy range. A person with a BMI of 25 to 29.9 is considered overweight, and someone with a BMI of 30 or greater is considered obese.  Another way to find out if you are at risk for health problems caused by overweight and obesity is to measure your waist. If you are a woman and your waist is more than 35 inches, or if you are a man and your waist is more than 40 inches, your risk of disease may be higher.  More than two-thirds of American adults are considered overweight or obese. Being overweight or obese increases the risk for further weight gain.  Excess weight may lead to heart disease and diabetes. Creating and following plans for healthy eating and physical activity may help you improve your health.    Methods for maintaining or losing weight.  Weight control is part of healthy lifestyle and includes exercise, emotional health, and healthy eating habits.  Careful eating habits lifelong is the mainstay of weight control.  Though there are significant health benefits from weight loss, long-term weight loss with diet alone may be very difficult to achieve- studies show long-term success with dietary management in less than 10% of people. Attaining a healthy weight may be especially difficult to achieve in those with severe obesity. In some cases, medications, devices and surgical management might be considered.    What can you do?  If you are overweight or obese and are interested in methods for weight loss, you should discuss this with your provider. In addition, we recommend that you review healthy life styles and methods for weight loss available through the National Institutes of Health patient information sites:    http://win.niddk.nih.gov/publications/index.htm

## 2022-07-28 NOTE — PROGRESS NOTES
Outpatient Sleep Medicine Consultation:      Name: Chago Soni MRN# [id number]   Age: 27 year old YOB: 1995     Date of Consultation: July 28, 2022  Consultation is requested by: Martin Marie MD  [address]  [place] [address] Martin Marie  Primary care provider: [facility]       Reason for Sleep Consult:     Chago Soni is sent by Martin Marie for a sleep consultation regarding sleep walking.    Patient s Reason for visit  Chago Soni main reason for visit: Almost every night I sleep walk in some capacity, typically multiple times per night. Most of the time it s in a frantic or startled manner and sometimes I wake up from it and other times I don t. I started recording myself to see how often and confirm.  Patient states problem(s) started: Age 7  Chago Soni's goals for this visit: Find a solution that allows me to stop waking up so often and actually get restful sleep           Assessment and Plan:     Summary Sleep Diagnoses:  1. Sleep disturbance  2. Sleep walking  3. Parasomnia, unspecified type  4. Insomnia, unspecified type  - Adult Sleep Eval & Management  Referral  - Comprehensive Sleep Study; Future      Comorbid Diagnoses:  1.  Allergic rhinitis  2.  Chronic right shoulder pain      Summary Recommendations:  1.  Recommend further evaluation with in lab polysomnography for possible NREM sleep disorder/parasomnia versus RBD or overlap syndrome, sleep disordered breathing, ?sleep-related seizures, and PLMD.  2.  Follow-up in approximately 2 weeks after the sleep study to review the results and determine next steps.    Orders Placed This Encounter   Procedures     Comprehensive Sleep Study         Summary Counseling:    Sleep Testing Reviewed  Obstructive Sleep Apnea Reviewed  Complications of Untreated Sleep Apnea Reviewed  Previous recent chart notes and lab results reviewed    Medical Decision-making:   Educational  materials provided in instructions    Total time spent reviewing medical records, history and physical examination, review of previous testing and interpretation as well as documentation on this date: 45 minutes    CC: Martin Marie          History of Present Illness:     Chago Soni is a 27-year-old male with a PMH pertinent for allergic rhinitis, chronic right shoulder pain, and sleepwalking who presents today for symptoms of sleepwalking behaviors has episodes of confusional arousals nearly 1-2 x/night, wakes with heart racing, sweats, frantic feeling, dream-like waking/nightmare?, poorly restful sleep despite adequate sleep time, and some daytime somnolence. He notes that he has had conversations with his GF in his sleep which he sometimes recalls and apparently the speech is intelligible/clear at times. He avoids caffeine, alcohol, tobacco and has a dark and comfortable sleep environment. His sleep walking has been nearly all of his life, the waking with heart racing and frantic feeling are relatively new for him within the last year or so. These symptoms have increased to 1-2x/night over the last year or so. He has been told that he kicks his legs/moves around in his sleep at night by his bed partner. Denies any family history of sleep disorders, sleep walking, or history of seizures. He denies harm to himself or others during his sleep walking.  He has never been evaluated for his sleep in the past.    Past Sleep Evaluations: No    SLEEP-WAKE SCHEDULE:     Work/School Days: Patient goes to school/work: Yes, works for the [employer] (works day hours)   Usually gets into bed at 10:30  Takes patient about 15 minutes to fall asleep  Has trouble falling asleep 1 nights per week  Wakes up in the middle of the night 2-4 times times.  Wakes up due to Use the bathroom, Nightmares, Other  He has trouble falling back asleep 2-3 times a week.   It usually takes 20-30 minutes to get back to sleep  Patient  is usually up at 7:00 am every week day  Uses alarm: Yes    Weekends/Non-work Days/All Other Days:  Usually gets into bed at 11:00-11:30   Takes patient about 15-20 min to fall asleep  Patient is usually up at 8:30-9:00 am  Uses alarm: No    Sleep Need  Patient gets  8-8 1/2 hours sleep on average   Patient thinks he needs about 8 hours sleep    Chago Soni prefers to sleep in this position(s): Back, Side   Patient states they do the following activities in bed:  Reads before bed in bed x 20-30 mins    Naps  Patient takes a purposeful nap 1 times a week and naps are usually 30 min in duration  He feels better after a nap: Yes  He dozes off unintentionally 1 days per week  Patient has had a driving accident or near-miss due to sleepiness/drowsiness: No      SLEEP DISRUPTIONS:    Breathing/Snoring  Patient snores:No  Other people complain about his snoring: No  Patient has been told he stops breathing in his sleep: No  He has issues with the following: Morning headaches, Morning mouth dryness, Stuffy nose when you wake up    Movement:  Patient gets pain, discomfort, with an urge to move:  No  It happens when he is resting:  No  It happens more at night:  No  Patient has been told he kicks his legs at night:  Yes     Behaviors in Sleep:  Chago Soni has experienced the following behaviors while sleeping: Sleep-talking, Sleepwalking, Night terrors (screaming,yelling or acting afraid but not recalling event), See or hear things that are not really there upon awakening or just falling asleep, denies sleep paralysis  He has experienced sudden muscle weakness during the day: No, denies cataplexy      Is there anything else you would like your sleep provider to know:  No      CAFFEINE AND OTHER SUBSTANCES:    Patient consumes caffeinated beverages per day:  1  Last caffeine use is usually: Noon  List of any prescribed or over the counter stimulants that patient takes: None  List of any prescribed or over the counter  sleep medication patient takes: None  List of previous sleep medications that patient has tried: None  Patient drinks alcohol to help them sleep: No  Patient drinks alcohol near bedtime: No    Family History:  Patient has a family member been diagnosed with a sleep disorder: No            SCALES:    EPWORTH SLEEPINESS SCALE      Gipsy Sleepiness Scale ( SAMANTHA Gavin  1990-1997[facility]/English - Final version - 21 Nov 07 - [facility] [place].) 7/28/2022   Sitting and reading Would never doze   Watching TV Slight chance of dozing   Sitting, inactive in a public place (e.g. a theatre or a meeting) Would never doze   As a passenger in a car for an hour without a break Would never doze   Lying down to rest in the afternoon when circumstances permit Moderate chance of dozing   Sitting and talking to someone Would never doze   Sitting quietly after a lunch without alcohol Would never doze   In a car, while stopped for a few minutes in traffic Would never doze   Gipsy Score (MC) 3   Gipsy Score (Sleep) 3         INSOMNIA SEVERITY INDEX (JOE)      Insomnia Severity Index (JOE) 7/28/2022   Difficulty falling asleep 1   Difficulty staying asleep 3   Problems waking up too early 1   How SATISFIED/DISSATISFIED are you with your CURRENT sleep pattern? 3   How NOTICEABLE to others do you think your sleep problem is in terms of impairing the quality of your life? 3   How WORRIED/DISTRESSED are you about your current sleep problem? 3   To what extent do you consider your sleep problem to INTERFERE with your daily functioning (e.g. daytime fatigue, mood, ability to function at work/daily chores, concentration, memory, mood, etc.) CURRENTLY? 3   JOE Total Score 17       Guidelines for Scoring/Interpretation:  Total score categories:  0-7 = No clinically significant insomnia   8-14 = Subthreshold insomnia   15-21 = Clinical insomnia (moderate severity)  22-28 = Clinical insomnia (severe)  Used via courtesy of  "www.[facility] Hospitale[facility].va.gov with permission from Real Hanks PhD., [facility]      STOP BANG score: 2    STOP BANG Questionnaire (  2008, the American Society of Anesthesiologists, Inc. [employer].) 7/28/2022   1. Snoring - Do you snore loudly (louder than talking or loud enough to be heard through closed doors)? No   2. Tired - Do you often feel tired, fatigued, or sleepy during daytime? Yes   3. Observed - Has anyone observed you stop breathing during your sleep? No   4. Blood pressure - Do you have or are you being treated for high blood pressure? No   5. BMI - BMI more than 35 kg/m2? No   6. Age - Age over 50 yr old? No   7. Neck circumference - Neck circumference greater than 40 cm? No   8. Gender - Gender male? Yes   STOP BANG Score (MC): 1 (Low risk of GELA)   Neck Cir (cm) Clinic: 41   B/P Clinic: 118/77   BMI Clinic: 25.23         GAD7    PILO-7  7/6/2022   1. Feeling nervous, anxious, or on edge 0   2. Not being able to stop or control worrying 0   3. Worrying too much about different things 0   4. Trouble relaxing 1   5. Being so restless that it is hard to sit still 3   6. Becoming easily annoyed or irritable 0   7. Feeling afraid, as if something awful might happen 0   PILO-7 Total Score 4   If you checked any problems, how difficult have they made it for you to do your work, take care of things at home, or get along with other people? Somewhat difficult         CAGE-AID    No flowsheet data found.    CAGE-AID reprinted with permission from the [place] Medical Journal, DON Call. and SHELDON Gracia, \"Conjoint screening questionnaires for alcohol and drug abuse\" [place] Medical Journal 94: 135-140, 1995.      PATIENT HEALTH QUESTIONNAIRE-9 (PHQ - 9)    PHQ-9 (Pfizer) 7/6/2022   1.  Little interest or pleasure in doing things 0   2.  Feeling down, depressed, or hopeless 0   3.  Trouble falling or staying asleep, or sleeping too much 3   4.  Feeling tired or having little energy 1 "   5.  Poor appetite or overeating 0   6.  Feeling bad about yourself 0   7.  Trouble concentrating 3   8.  Moving slowly or restless 2   9.  Suicidal or self-harm thoughts 0   PHQ-9 Total Score 9   Difficulty at work, home, or with people Very difficult       Developed by Nile Gamez, Edith Roman, Patricio Petersen and colleagues, with an educational markel from Pfizer Inc. No permission required to reproduce, translate, display or distribute.        Allergies:    Allergies   Allergen Reactions     Doxycycline      Eye swelling        Medications:    Current Outpatient Medications   Medication Sig Dispense Refill     fluticasone (FLONASE) 50 MCG/ACT nasal spray Spray 1 spray into both nostrils daily 16 g 1     montelukast (SINGULAIR) 10 MG tablet Take 1 tablet (10 mg) by mouth At Bedtime 90 tablet 1       Problem List:  Patient Active Problem List    Diagnosis Date Noted     Sleep walking 03/16/2022     Priority: Medium     Herpes simplex virus infection 11/12/2021     Priority: Medium     Seasonal allergic rhinitis due to pollen 07/06/2021     Priority: Medium     Allergic rhinitis due to animals 07/06/2021     Priority: Medium     Allergic rhinitis due to dust mite 07/06/2021     Priority: Medium     Allergic conjunctivitis, bilateral 07/06/2021     Priority: Medium     Chronic right shoulder pain 08/25/2020     Priority: Medium        Past Medical/Surgical History:  No past medical history on file.  No past surgical history on file.    Social History:  Social History     Socioeconomic History     Marital status: Single     Spouse name: Not on file     Number of children: Not on file     Years of education: Not on file     Highest education level: Not on file   Occupational History     Not on file   Tobacco Use     Smoking status: Never Smoker     Smokeless tobacco: Never Used   Vaping Use     Vaping Use: Never used   Substance and Sexual Activity     Alcohol use: Yes     Comment: 6 drinks per week  "    Drug use: Never     Sexual activity: Never   Other Topics Concern     Not on file   Social History Narrative     Not on file     Social Determinants of Health     Financial Resource Strain: Not on file   Food Insecurity: Not on file   Transportation Needs: Not on file   Physical Activity: Not on file   Stress: Not on file   Social Connections: Not on file   Intimate Partner Violence: Not on file   Housing Stability: Not on file       Family History:  Family History   Problem Relation Age of Onset     Allergies Mother      Allergies Father        Review of Systems:  A complete review of systems reviewed by me is negative with the exeption of what has been mentioned in the history of present illness.  In the last TWO WEEKS have you experienced any of the following symptoms?  Fevers: No  Night Sweats: Yes  Weight Gain: No  Pain at Night: No  Double Vision: No  Changes in Vision: No  Difficulty Breathing through Nose: No  Sore Throat in Morning: No  Dry Mouth in the Morning: Yes  Shortness of Breath Lying Flat: No  Shortness of Breath With Activity: No  Awakening with Shortness of Breath: No  Increased Cough: No  Heart Racing at Night: Yes  Swelling in Feet or Legs: No  Diarrhea at Night: No  Heartburn at Night: No  Urinating More than Once at Night: No  Losing Control of Urine at Night: No  Joint Pains at Night: No  Headaches in Morning: No  Weakness in Arms or Legs: No  Depressed Mood: No  Anxiety: Yes     Physical Examination:  Vitals: /77   Pulse 56   Ht 1.803 m (5' 11\")   Wt 82.1 kg (180 lb 14.4 oz)   SpO2 100%   BMI 25.23 kg/m    BMI= Body mass index is 25.23 kg/m .    Neck Cir (cm): 41 cm      GENERAL APPEARANCE: healthy, alert, no distress and cooperative  EYES: Eyes grossly normal to inspection, PERRL and conjunctivae and sclerae normal  HENT: nose and mouth without ulcers or lesions, oropharynx crowded, tongue base enlarged, oral mucous membranes moist and normal cephalic/atraumatic  NECK: no " adenopathy, no asymmetry, masses, or scars, thyroid normal to palpation and trachea midline and normal to palpation  RESP: lungs clear to auscultation - no rales, rhonchi or wheezes  CV: regular rates and rhythm, normal S1 S2, no S3 or S4 and no murmur, click or rub  ABDOMEN: soft, nontender, without hepatosplenomegaly or masses and bowel sounds normal  MS: extremities normal- no gross deformities noted  SKIN: no suspicious lesions or rashes  NEURO: Alert and oriented x3, normal strength and tone, mentation intact and speech normal  PSYCH: mentation appears normal and affect normal/bright  Mallampati Class: II-III.  Tonsillar Stage: 3  extending beyond pillars.         Data: All pertinent previous laboratory data reviewed     No results for input(s): NA, POTASSIUM, CHLORIDE, CO2, ANIONGAP, GLC, BUN, CR, RACHEAL in the last 13106 hours.    No results for input(s): WBC, RBC, HGB, HCT, MCV, MCH, MCHC, RDW, PLT in the last 56091 hours.    No results for input(s): PROTTOTAL, ALBUMIN, BILITOTAL, ALKPHOS, AST, ALT, BILIDIRECT in the last 48530 hours.    No results found for: TSH    No results found for: UAMP, UBARB, BENZODIAZEUR, UCANN, UCOC, OPIT, UPCP    No results found for: IRONSAT, [id number], LEANDER    No results found for: PH, PHARTERIAL, PO2, [id number], SAT, PCO2, HCO3, BASEEXCESS, SPENCER, BEB    @LABRCNTIPR(phv:4,pco2v:4,po2v:4,hco3v:4,melisa:4,o2per:4)@    Echocardiology: No results found for this or any previous visit (from the past 4320 hour(s)).    Chest x-ray: No results found for this or any previous visit from the past 365 days.      Chest CT: No results found for this or any previous visit from the past 365 days.      PFT: Most Recent Breeze Pulmonary Function Testing    No results found for: 20001  No results found for: 20002  No results found for: 20003  No results found for: 20015  No results found for: 20016  No results found for: 20027  No results found for: 20028  No results found for: 20029  No results found for:  20079  No results found for: 20080  No results found for: 20081  No results found for: [address]335  No results found for: 20105  No results found for: 20053  No results found for: 20054  No results found for: 20055      PAULA Dobson CNP 7/28/2022   Sleep Medicine      This note was written with the assistance of the Dragon voice-dictation technology software. The final document, although reviewed, may contain errors. For corrections, please contact the office.

## 2022-09-27 ENCOUNTER — VIRTUAL VISIT (OUTPATIENT)
Dept: FAMILY MEDICINE | Facility: CLINIC | Age: 27
End: 2022-09-27
Payer: OTHER GOVERNMENT

## 2022-09-27 DIAGNOSIS — M54.9 UPPER BACK PAIN: ICD-10-CM

## 2022-09-27 DIAGNOSIS — R41.840 LACK OF CONCENTRATION: ICD-10-CM

## 2022-09-27 DIAGNOSIS — Z30.09 ENCOUNTER FOR VASECTOMY ASSESSMENT: Primary | ICD-10-CM

## 2022-09-27 PROCEDURE — 99214 OFFICE O/P EST MOD 30 MIN: CPT | Mod: 95 | Performed by: FAMILY MEDICINE

## 2022-09-27 NOTE — PROGRESS NOTES
Ross is a 27 year old who is being evaluated via a billable video visit.    Called via [employer]  5.50 PM    Pt dis  How would you like to obtain your AVS? MyChart  If the video visit is dropped, the invitation should be resent by: Text to cell phone: [phone]  Will anyone else be joining your video visit? No        Assessment & Plan     Encounter for vasectomy assessment  Pt says he does not want children and very sure he wants this Procedure  - Adult Urology  Referral; Future    Lack of concentration  Referral done   - Adult Mental Health  Referral; Future    Upper back pain  Pt in PT  Take advil OTC  BMI:     Return in about 1 month (around 10/27/2022) for Physical Exam.    Jacqueline Hamilton MD  [facility]    Subjective   Ross is a 27 year old, presenting for the following health issues:  No chief complaint on file.      History of Present Illness       Reason for visit:  A variety of reasons (1. consult for vasectomy 2. Referral to chiropractor for back pain. 3. Referral for Adult ADHD testing. 4.)  Symptom onset:  More than a month  Symptom intensity:  Severe  Symptom progression:  Worsening  Had these symptoms before:  Yes  Has tried/received treatment for these symptoms:  Yes  Previous treatment was successful:  No    He eats 4 or more servings of fruits and vegetables daily.He consumes 0 sweetened beverage(s) daily.He exercises with enough effort to increase his heart rate 60 or more minutes per day.  He exercises with enough effort to increase his heart rate 7 days per week.   He is taking medications regularly.     Pt has had a Lot of back pain and neck pain  Pt has upper back pain Right  No known injuries  Feels tight  No radiation  Pt has Physical therapy  Pt  Wants adhd testing  Hard time concentrating  Lack of focus  Forgets stuff  Has a hard time completeing task      Review of Systems   Rest of the ROS is Negative except see above and Problem list  [stable]        Objective           Vitals:  No vitals were obtained today due to virtual visit.    Physical Exam   GENERAL: Healthy, alert and no distress  EYES: Eyes grossly normal to inspection.  No discharge or erythema, or obvious scleral/conjunctival abnormalities.  RESP: No audible wheeze, cough, or visible cyanosis.  No visible retractions or increased work of breathing.    SKIN: Visible skin clear. No significant rash, abnormal pigmentation or lesions.  NEURO: Cranial nerves grossly intact.  Mentation and speech appropriate for age.  PSYCH: Mentation appears normal, affect normal/bright, judgement and insight intact, normal speech and appearance well-groomed.        Video-Visit Details    Video Start Time: 5.52 PM    Type of service:  Video Visit    Video End Time:6:08 PM    Originating Location (pt. Location): Home    Distant Location (provider location):  [facility]     Platform used for Video Visit: [email or web address]

## 2022-10-17 ENCOUNTER — VIRTUAL VISIT (OUTPATIENT)
Dept: PSYCHOLOGY | Facility: CLINIC | Age: 27
End: 2022-10-17
Attending: FAMILY MEDICINE
Payer: OTHER GOVERNMENT

## 2022-10-17 DIAGNOSIS — R41.840 LACK OF CONCENTRATION: ICD-10-CM

## 2022-10-17 PROCEDURE — 90837 PSYTX W PT 60 MINUTES: CPT | Mod: 95

## 2022-10-17 ASSESSMENT — COLUMBIA-SUICIDE SEVERITY RATING SCALE - C-SSRS
3. HAVE YOU BEEN THINKING ABOUT HOW YOU MIGHT KILL YOURSELF?: NO
6. HAVE YOU EVER DONE ANYTHING, STARTED TO DO ANYTHING, OR PREPARED TO DO ANYTHING TO END YOUR LIFE?: NO
1. IN THE PAST MONTH, HAVE YOU WISHED YOU WERE DEAD OR WISHED YOU COULD GO TO SLEEP AND NOT WAKE UP?: NO
5. HAVE YOU STARTED TO WORK OUT OR WORKED OUT THE DETAILS OF HOW TO KILL YOURSELF? DO YOU INTEND TO CARRY OUT THIS PLAN?: NO
2. HAVE YOU ACTUALLY HAD ANY THOUGHTS OF KILLING YOURSELF IN THE PAST MONTH?: NO
4. HAVE YOU HAD THESE THOUGHTS AND HAD SOME INTENTION OF ACTING ON THEM?: NO

## 2022-10-17 ASSESSMENT — ANXIETY QUESTIONNAIRES
3. WORRYING TOO MUCH ABOUT DIFFERENT THINGS: NOT AT ALL
IF YOU CHECKED OFF ANY PROBLEMS ON THIS QUESTIONNAIRE, HOW DIFFICULT HAVE THESE PROBLEMS MADE IT FOR YOU TO DO YOUR WORK, TAKE CARE OF THINGS AT HOME, OR GET ALONG WITH OTHER PEOPLE: SOMEWHAT DIFFICULT
GAD7 TOTAL SCORE: 6
GAD7 TOTAL SCORE: 6
2. NOT BEING ABLE TO STOP OR CONTROL WORRYING: NOT AT ALL
6. BECOMING EASILY ANNOYED OR IRRITABLE: SEVERAL DAYS
5. BEING SO RESTLESS THAT IT IS HARD TO SIT STILL: NEARLY EVERY DAY
7. FEELING AFRAID AS IF SOMETHING AWFUL MIGHT HAPPEN: NOT AT ALL
1. FEELING NERVOUS, ANXIOUS, OR ON EDGE: NOT AT ALL

## 2022-10-17 ASSESSMENT — PATIENT HEALTH QUESTIONNAIRE - PHQ9
5. POOR APPETITE OR OVEREATING: MORE THAN HALF THE DAYS
SUM OF ALL RESPONSES TO PHQ QUESTIONS 1-9: 8

## 2022-10-17 NOTE — PROGRESS NOTES
[facility]     Disclaimer: Voice recognition software was used to generate this note. As a result, wrong word or 'sound-a-like' substitutions may have occurred due to the inherent limitations of voice recognition software. There may be errors in the script that have gone undetected. Please consider this when interpreting information found in this chart.     ADHD assessment - Initial Visit    Patient  Name:  Chago Soni Date: 10/17/22         Service Type: Individual     Visit Start Time: 10:00  Visit End Time: 10:55    Visit #: 1    Attendees: Client attended alone    Service Modality:  Video Visit:      Provider verified identity through the following two step process.  Patient provided:  Patient     Telemedicine Visit: The patient's condition can be safely assessed and treated via synchronous audio and visual telemedicine encounter.      Reason for Telemedicine Visit: Services only offered telehealth    Originating Site (Patient Location): Client was his car outside of work for privacy.    Distant Site (Provider Location): [facility]    Consent:  The patient/guardian has verbally consented to: the potential risks and benefits of telemedicine (video visit) versus in person care; bill my insurance or make self-payment for services provided; and responsibility for payment of non-covered services.     Patient would like the video invitation sent by:  My Chart    Mode of Communication:  Video Conference via [employer]    Distant Location (Provider):  On-site    As the provider I attest to compliance with applicable laws and regulations related to telemedicine.       DATA:   Interactive Complexity: No   Crisis: No     Presenting Concerns/  Current Stressors:   Client presents for session 1 of ADHD assessment. Client endorsed difficulty with ADHD symptoms and support of teachers and parents and his  ability. Client stated he was told often that he wasn't working up to his potential. Client reported as long as he did well on standardized tests, he was promoted in school. Client endorsed difficulty with sleep and staying focused at work. He had tried college for a few years but found it difficult to sustain attention to study and listen to lectures. Client endorsed 9/9 symptoms for inattention and 6/9 for hyperactivity.PHQ 9 - 9 indicating mild depressive symptoms. Will continue with intake next week and assessments for ADHD.    ASSESSMENT:  Mental Status Assessment:  Appearance:   Appropriate   Eye Contact:   Fair   Psychomotor Behavior: Hyperactive   Attitude:   Cooperative   Orientation:   All  Speech   Rate / Production: Talkative   Volume:  Normal   Mood:    Irritable   Affect:    Labile   Thought Content:  Clear   Thought Form:  Goal Directed   Insight:    Fair       Safety Issues and Plan for Safety and Risk Management:     Mc[place]ough Suicide Severity Rating Scale (Lifetime/Recent)  Mc[place]ough Suicide Severity Rating (Lifetime/Recent) 10/17/2022   Q1 Wished to be Dead (Past Month) no   Q2 Suicidal Thoughts (Past Month) no   Q3 Suicidal Thought Method no   Q4 Suicidal Intent without Specific Plan no   Q5 Suicide Intent with Specific Plan no   Q6 Suicide Behavior (Lifetime) no   Level of Risk per Screen low risk     Patient denies current fears or concerns for personal safety.  Patient denies current or recent suicidal ideation or behaviors.  Patient denies current or recent homicidal ideation or behaviors.  Patient denies current or recent self injurious behavior or ideation.  Patient denies other safety concerns.  Recommended that patient call 911 or go to the local ED should there be a change in any of these risk factors.  Patient reports there are unknown at this time..     Diagnostic Criteria:  Persistent Depressive Disorder  Client endorsed depressive symptoms, mild PHQ9, will follow up with further  assessments.      DSM5 Diagnoses: (Sustained by DSM5 Criteria Listed Above)  Diagnoses: 300.4 (F34.1) Persistent Depressive Disorder, In partial remission  Psychosocial & Contextual Factors: Client struggling at work and concerned at lack of focus.    Intervention:   Educated on treatment planning and started identifying goals and interventions for treatment plan    Collateral Reports Completed:  will complete intake and assessment.      PLAN: (Homework, other):  1. Provider will continue Diagnostic Assessment.  Patient was given the following to do until next session: Complete ADHD rating scales.    2. Provider recommended the following referrals: none at this time.      3.  Suicide Risk and Safety Concerns were assessed for Chago Soni.    Patient does not meet any risk criteria based on [place]-SSRS.       Safety Concerns: none endorsed currently or across lifetime       Martha Arvizu  October 17, 2022

## 2022-10-23 ENCOUNTER — THERAPY VISIT (OUTPATIENT)
Dept: SLEEP MEDICINE | Facility: CLINIC | Age: 27
End: 2022-10-23
Payer: OTHER GOVERNMENT

## 2022-10-23 DIAGNOSIS — F51.3 SLEEP WALKING: ICD-10-CM

## 2022-10-23 DIAGNOSIS — G47.50 PARASOMNIA, UNSPECIFIED TYPE: ICD-10-CM

## 2022-10-23 DIAGNOSIS — G47.9 SLEEP DISTURBANCE: ICD-10-CM

## 2022-10-23 PROCEDURE — 95810 POLYSOM 6/> YRS 4/> PARAM: CPT | Performed by: INTERNAL MEDICINE

## 2022-10-25 ENCOUNTER — VIRTUAL VISIT (OUTPATIENT)
Dept: PSYCHOLOGY | Facility: CLINIC | Age: 27
End: 2022-10-25
Payer: OTHER GOVERNMENT

## 2022-10-25 DIAGNOSIS — F90.2 ADHD (ATTENTION DEFICIT HYPERACTIVITY DISORDER), COMBINED TYPE: Primary | ICD-10-CM

## 2022-10-25 PROCEDURE — 90791 PSYCH DIAGNOSTIC EVALUATION: CPT | Mod: 95

## 2022-10-25 NOTE — PROGRESS NOTES
[facility]    Disclaimer: Voice recognition software was used to generate this note. As a result, wrong word or 'sound-a-like' substitutions may have occurred due to the inherent limitations of voice recognition software. There may be errors in the script that have gone undetected. Please consider this when interpreting information found in this chart.     Provider Name:  Martha Arvizu, Doctoral Student Psychology  Osmel Nieves PhD, LP    PATIENT'S NAME: Chago Soni  PREFERRED NAME: Ross  PRONOUNS:       MRN: [id number]  : 1995  ADDRESS: [address] [place] [id number]  ACCT. NUMBER:  [id number]  DATE OF SERVICE: 10/25/22  START TIME: 8:00  END TIME: 8:52  PREFERRED PHONE: [phone]  May we leave a program related message: Yes  SERVICE MODALITY:  Video Visit:      Provider verified identity through the following two step process.  Patient provided:  Patient is known previously to provider    Telemedicine Visit: The patient's condition can be safely assessed and treated via synchronous audio and visual telemedicine encounter.      Reason for Telemedicine Visit: Services only offered telehealth    Originating Site (Patient Location): Patient's other patient in his car outside of workplace.    Distant Site (Provider Location): Provider Remote Setting- Home Office    Consent:  The patient/guardian has verbally consented to: the potential risks and benefits of telemedicine (video visit) versus in person care; bill my insurance or make self-payment for services provided; and responsibility for payment of non-covered services.     Patient would like the video invitation sent by:  My Chart    Mode of Communication:  Video Conference via [facility]    Distant Location (Provider):  Off-site    As the provider I attest to compliance with applicable laws and regulations related to telemedicine.    UNIVERSAL ADULT ADHD DIAGNOSTIC ASSESSMENT    Identifying Information:  Patient is a 27 year  old,  .  The pronoun use throughout this assessment reflects the patient's chosen pronoun.  Patient was referred for an assessment by Dr[facility] Jacqueline Hamilton  [facility].  Patient attended the session alone.    Chief Complaint:   The purpose of this evaluation is to: provide treatment recommendations and clarify diagnosis. Patient reported seeking services at this time for diagnostic assessment and recommendations for treatment.  Patient reported that      has not completed a previous ADHD diagnostic assessment.  Patient has not received a previous diagnosis of ADHD. Patient reported that medication has not been prescribed medication to address these problems.     Client Reports:  Client reports that his symptoms hold him back from completing a college degree and advancing in [employer] in more responsible roles of leadership. Client works with what he describes as an excellent team and that he is typically a leader both socially and professionally. His specific work assignment in the [employer] Is in a high stress area but he loves his work and states that it gives him purpose. Client endorses some mood symptoms, and discussed previous traumatic situation when [place]. 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 Client also endorsed some symptoms of jena including times of inflated self-image. Client had endorsed problems with sleep and had a sleep study done this week. His partner has witnessed sleep walking and talking he has not had a follow up visit yet with Sleep Medicine to learn of study findings. Client states he has an excellent relationship with his female partner and his parents. Client eats healthy and exercises aerobically 6 times per week usually basketball or soccer and lifts weights 3 times per week. Will follow up  with client after assessments are completed and interpreted.    Social/Family History:  Patient reported they grew up in [place], [place].  They were raised by biological parents.  Parents stayed ..  Patient reported that their childhood was happy other than some challenges at school. Client states that although ADHD symptoms started in elementary school he has been able to use intellect to pass standardized tests.  Patient described their current relationships with family of origin as good- better with parents since he is supporting himself financially.      The patient describes their cultural background as typically American of [place] descent.  Cultural influences and impact on patient's life structure, values, norms, and healthcare: none..  Contextual influences on patient's health include: Individual Factors client wishes to advance professionally and educationally, no other factors..    These factors will be addressed in the Preliminary Treatment plan.  Patient identified their preferred language to be English. Patient reported they does not need the assistance of an  or other support involved in therapy.     Patient reported had no significant delays in developmental tasks.   Patient's highest education level was some college. Patient identified the following learning problems: struggling to pay attention in classes, attending classes and sitting still in classes..  Modifications will not be used to assist communication in therapy.   Patient reports they are able to understand written materials.      Patient reported the following relationship history one previous relationship that didn't work out, current relationship is going well.  Patient's current relationship status is partnered / significant other for 2.5 year.  Patient identified their sexual orientation as heterosexual.  Patient reported having zero child(dana). Patient identified partner, parents and many close freinds. as part  of their support system.  Patient identified the quality of these relationships as stable and meaningful.      Patient's current living/housing situation involves staying in own home/apartment.  They live with partner and they report that housing is stable.     Patient is currently employed full time in the [employer]..  Patient reports their finances are obtained through employment.  Patient does not identify finances as a current stressor.      Patient reported that they have not been involved with the legal system.   Patient does report being on probation / parole / under the jurisdiction of the court: none- client is in the  and no legal history..    ADHD Symptom History  Client's highest education level was some college. During the elementary, middle, and high school years, patient recalls academic strengths in the area of reading, writing and math. Client took Advance Placement courses in high school.. Client reported experiencing academic problems in college lectures were a challenge for client.. Client did not identify any learning problems. Client did not receive tutoring services during the school years. Client did not receive special education services. Client reported client struggled with routine work but was able to pass standardized tests without difficulty.. Client did attend post secondary school.   Client reported no difficulty with childhood peer relationships.As a child, client reported that he had difficulty managing personal hygiene.   Client reported that he is currently employed. Client reported that the current job is a good fit for his skills and personality.  Client reported that he has trouble finishing tasks efficiently, needs to get up and move often, and is forgetful at work. .  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  The longest period of employment has been 5 years with the [employer]  Client has not been terminated from a place of  employment.As a child, client reported having sleep disturbance, including: client walks and talks in his sleep per his partner's observations. .  Client reported currently experiencing sleep disturbance, including: talking in sleep and walking in sleep..  Client reported sleeping approximately 4-6 hours per night.  Client reported that he has completed a sleep study.  Client reported having a well balanced diet.  There are not significant nutritional concerns.  Client reported engaging in regular exercise.    Patient has received a 's license.  Patient has not received any moving violations.  Patient reported the following driving habits: often exceeds the speed limit / speeds.  According to client, other people are comfortable riding as passengers when he is driving.     Patient's Strengths and Limitations:  Patient identified the following strengths or resources that will help them succeed in treatment: client endorses great support from partner, family and friends. Client states that his strengths are social personality, leadership, and creativity.. Things that may interfere with the patient's success in treatment include: none identified.     Personal and Family Medical History:  Patient does not report a family history of mental health concerns.  Patient reports family history includes Allergies in his father and mother..     Patient does not report Mental Health Diagnosis or Treatment.      Patient has had a physical exam to rule out medical causes for current symptoms.  Date of last physical exam was within the past year. Client was encouraged to follow up with PCP if symptoms were to develop. The patient has a [place] [facility] Provider, who is [facility]..  Patient reports concerns about sleep behaviors, sleep study completed, results unknown at this time..  Patient denies any issues with pain..   There are not significant appetite / nutritional concerns / weight changes.   Patient  does report a history of head injury / trauma / cognitive impairment.    One minor concussion in 9th grade.   Patient reports not taking any current medications    Medication Adherence:  Patient reports taking.  taking prescribed medications as prescribed.    Patient Allergies:    Allergies   Allergen Reactions     Doxycycline      Eye swelling        Medical History:  No past medical history on file.      Substance Use:  Patient did not report a family history of substance use concerns; see medical history section for details.  Patient has not received chemical dependency treatment in the past.  Patient has not ever been to detox.      Patient is not currently receiving any chemical dependency treatment.           Substance History of use Age of first use Date of last use     Pattern and duration of use (include amounts and frequency)   Alcohol currently use   21 10/01/22 REPORTS SUBSTANCE USE: reports using substance 2 times per week and has 2 drinks at a time.   Patient reports heaviest use is current use.   Cannabis   never used     REPORTS SUBSTANCE USE: N/A     Amphetamines   never used     REPORTS SUBSTANCE USE: N/A   Cocaine/crack    never used       REPORTS SUBSTANCE USE: N/A   Hallucinogens never used         REPORTS SUBSTANCE USE: N/A   Inhalants never used         REPORTS SUBSTANCE USE: N/A   Heroin never used         REPORTS SUBSTANCE USE: N/A   Other Opiates never used     REPORTS SUBSTANCE USE: N/A   Benzodiazepine   never used     REPORTS SUBSTANCE USE: N/A   Barbiturates never used     REPORTS SUBSTANCE USE: N/A   Over the counter meds never used     REPORTS SUBSTANCE USE: N/A   Caffeine currently use 23   REPORTS SUBSTANCE USE: N/A   Nicotine  never used     REPORTS SUBSTANCE USE: N/A   Other substances not listed above:  Identify:  never used     REPORTS SUBSTANCE USE: N/A     Patient reported the following problems as a result of their substance use: no problems, not applicable.     CAGE- AID:     CAGE-AID Total Score 10/17/2022 10/17/2022   Total Score 0 0   Total Score MyChart - 0 (A total score of 2 or greater is considered clinically significant)       Substance Use: No symptoms    Based on the negative CAGE score and clinical interview there  are not indications of drug or alcohol abuse.      Current Mental Status Exam:   Appearance:  Appropriate    Eye Contact:  Fair   Psychomotor:  Hyperactive       Gait / station:  Virtual visit.  Attitude / Demeanor: Cooperative   Speech      Rate / Production: Normal/ Responsive      Volume:  Normal  volume      Language:  intact  Mood:   Normal  Affect:   Appropriate    Thought Content: Clear   Thought Process: Logical       Associations: No loosening of associations  Insight:   Good   Judgment:  Intact   Orientation:  All  Attention/concentration: Easily Distracted    Rating Scales:    PHQ9:    PHQ-9 SCORE 7/6/2022 10/17/2022   PHQ-9 Total Score 9 8       GAD7:    PILO-7 SCORE 7/6/2022 10/17/2022   Total Score 4 6     CGI:     First:No data recorded    Most recentNo data recorded      Significant Losses / Trauma / Abuse / Neglect Issues:   Patient did  serve in the .  There are indications or report of significant loss, trauma, abuse or neglect issues related to:  / combat experience did not witness killing but did experience bombings in nearby buildings, excessive heat and boredom when deployed..  Concerns for possible neglect are not present.     Safety Assessment:   Current Safety Concerns:  [place] Suicide Severity Rating Scale (Lifetime/Recent)  [place] Suicide Severity Rating (Lifetime/Recent) 10/17/2022   Q1 Wished to be Dead (Past Month) no   Q2 Suicidal Thoughts (Past Month) no   Q3 Suicidal Thought Method no   Q4 Suicidal Intent without Specific Plan no   Q5 Suicide Intent with Specific Plan no   Q6 Suicide Behavior (Lifetime) no   Level of Risk per Screen low risk     Patient denies current homicidal ideation and behaviors.  Patient  denies current self-injurious ideation and behaviors.    Patient denied risk behaviors associated with substance use.  Patient denies any high risk behaviors associated with mental health symptoms.  Patient reports the following current concerns for their personal safety: None.  Patient reports there are not firearms in the house.       The firearms are secured in a locked space.    History of Safety Concerns:  Patient denied a history of homicidal ideation.     Patient denied a history of personal safety concerns.    Patient denied a history of assaultive behaviors.    Patient denied a history of sexual assault behaviors.     Patient denied a history of risk behaviors associated with substance use.  Patient denies any history of high risk behaviors associated with mental health symptoms.  Patient reports the following protective factors: forward or future oriented thinking; dedication to family or friends; safe and stable environment; regular sleep; regular physical activity; sense of belonging; purpose; secure attachment; daily obligations; structured day; effective problem solving skills; commitment to well being; sense of meaning; positive social skills; financial stability; strong sense of self worth or esteem; sense of personal control or determination    Risk Plan:  See Recommendations for Safety and Risk Management Plan    Review of Symptoms per patient report:  Depression: No symptoms and mild symptoms, not diagnostic.  Linda:  client endorsed 5 symptoms as noted above.  Psychosis: No Symptoms  Anxiety: Fears/phobias heights, air travel turbulence, roller coasters, and centepedes.  Panic:  No symptoms  Post Traumatic Stress Disorder:  Reexperiencing of trauma   Eating Disorder: No Symptoms  ADD / ADHD:  multiple symptoms as noted above.  Conduct Disorder: No symptoms  Autism Spectrum Disorder: No symptoms  Obsessive Compulsive Disorder: No Symptoms    Patient reports the following compulsive behaviors and  treatment history: none..      Diagnostic Criteria:   Attention Deficit Hyperactivity Disorder  A) A persistent pattern of inattention and/or hyperactivity-impulsivity that interferes with functioning or development, as characterized by (1) Inattention and/or (2) Hyperactivity and Impulsivity    Functional Status:  Patient reports the following functional impairments: academic performance.       Nonprogrammatic care:  Patient is requesting basic services to address current mental health concerns.    Clinical Summary:  1. Reason for assessment: ADHD Evaluation .  2. Psychosocial, Cultural and Contextual Factors: none  .  3. Principal DSM5 Diagnoses  (Sustained by DSM5 Criteria Listed Above):   Attention-Deficit/Hyperactivity Disorder  314.01 (F90.2) Combined presentation.  4. 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  5. Provisional Diagnosis:  Attention-Deficit/Hyperactivity Disorder  314.01 (F90.2) Combined presentation as evidenced by  .  6. Prognosis: Expect Improvement.  7. Likely consequences of symptoms if not treated: worsening depression and anxiety.  8. Client strengths include:  client is intelligent and motivated to grown and take on more leadership roles and complete college degree. Client has strong social relationships. His stated strengths are listed above.  .     Recommendations:     1. Plan for Safety and Risk Management:   Recommended that patient call 911 or go to the local ED should there be a change in any of these risk factors..          Report to child / adult protection services was NA.     2. Patient's identified none..     3. Initial Treatment will focus on:    ADHD Testing:  Patient was given self and collaborative rating scales to be completed prior to the next appointment.  Depression and anxiety rating scales were completed.  Copies of none. were requested. .     4. Resources/Service Plan:    services are not  indicated.   Modifications to assist communication are not indicated.   Additional disability accommodations are not indicated.      5. Collaboration:   Collaboration / coordination of treatment will be initiated with the following support professionals: Primary Care Provider..      6.  Referrals:   The following referral(s) will be initiated: N/A none. Next Scheduled Appointment: after assessments are completed and interpreted will meet with client to discuss results.     A Release of Information has been obtained for the following:  N?/A     7. IZAIAH:    IZAIAH:  Discussed Discussed the general effects of drugs and alcohol on health and well-being and not applicable to client.. Provider gave patient printed information about the effects of chemical use on their health and well being. Recommendations: several reasons listed above goals/family etc. .     8. Records:   These na at time of assessment.   Information in this assessment was obtained from the medical record and  provided by patient who is a good historian.    Patient will have open access to their mental health medical record.      Provider Name/ Credentials: Martha Arvizu, Doctoral Student Psychology   Osmel Nieves PhD, LP October 25, 2022

## 2022-10-31 LAB — SLPCOMP: NORMAL

## 2022-10-31 NOTE — PROCEDURES
" SLEEP STUDY INTERPRETATION  DIAGNOSTIC POLYSOMNOGRAPHY REPORT      Patient: NICOLE TILLMAN  YOB: 1995  Study Date: 10/23/2022  MRN: [id number]  Referring Provider: -  Ordering Provider: George Wallace    Indications for Polysomnography: The patient is a 27 year old Male who is 5' 11\" and weighs 180.0 lbs. His BMI is 25.2, [place] sleepiness scale 3 and neck circumference is 38 cm. A diagnostic polysomnogram was performed to evaluate for sleep apnea/ parasomnia.    Polysomnogram Data: A full night polysomnogram recorded the standard physiologic parameters including EEG, EOG, EMG, ECG, nasal and oral airflow. Respiratory parameters of chest and abdominal movements were recorded with respiratory inductance plethysmography. Oxygen saturation was recorded by pulse oximetry. Hypopnea scoring rule used: 1B 4%.    Sleep Architecture: Fragmented sleep.   The total recording time of the polysomnogram was 484.5 minutes. The total sleep time was 413.5 minutes. Sleep latency was normal at 8.0 minutes without the use of a sleep aid. REM latency was 212.0 minutes. Arousal index was increased at 25.5 arousals per hour. Sleep efficiency was normal at 85.3%. Wake after sleep onset was 63.0 minutes. The patient spent 9.2% of total sleep time in Stage N1, 59.5% in Stage N2, 11.9% in Stage N3, and 19.5% in REM. Time in REM supine was 43.0 minutes.    Respiration: Sustained sleep associated hypoxemia, without sleep apnea.     Events ? The polysomnogram revealed a presence of - obstructive, 1 central, and - mixed apneas resulting in an apnea index of 0.1 events per hour. There were 5 obstructive hypopneas and - central hypopneas resulting in an obstructive hypopnea index of 0.7 and central hypopnea index of - events per hour. The combined apnea/hypopnea index was 0.9 events per hour (central apnea/hypopnea index was 0.1 events per hour). The REM AHI was 2.2 events per hour. The supine AHI was 0.9 events per hour. The " RERA index was - events per hour.  The RDI was 0.9 events per hour.    Snoring - was reported as mild.    Respiratory rate and pattern - were notable for normal respiratory rate and pattern.    Sustained Sleep Associated Hypoventilation - Transcutaneous carbon dioxide monitoring was not used.    Sleep Associated Hypoxemia - (Greater than 5 minutes O2 sat at or below 88%) was present. Baseline oxygen saturation was 89.8%. Lowest oxygen saturation was 84.0%. Time spent less than or equal to 88% was 59.4 minutes. Time spent less than or equal to 89% was 207.0 minutes.    Movement Activity: Negative for movement abnormalities or parasomnia behaviors.     Periodic Limb Activity - There were 27 PLMs during the entire study. The PLM index was 3.9 movements per hour. The PLM Arousal Index was 1.5 per hour.    REM EMG Activity - Excessive transient/sustained muscle activity was not present.    Nocturnal Behavior - Abnormal sleep related behaviors were not noted during/arising out of NREM / REM sleep.     Bruxism - None apparent.    Cardiac Summary: Sinus rhythm.   The average pulse rate was 49.3 bpm. The minimum pulse rate was 40.0 bpm while the maximum pulse rate was 84.0 bpm.  Arrhythmias were not noted.    Assessment:     This sleep study is negative for sleep apnea. This was a good test for sleep apnea that captured supine REM.     Sustained sleep associated hypoxemia was noted, with O2 saturations marginally below 88%.     There was no evidence of abnormal behaviors in sleep or clinically significant periodic limb movements.    Muscle atonia was preserved in REM and there was no evidence of dream enactment behavior.     There was a mildly increased frequency of spontaneous arousals and mildly reduced Stage N3. Sleep architecture was unremarkable otherwise.     Recommendations:    Clinical assessment for etiology of sleep associated hypoxemia. Depending on clinical indications, consider specialist consultation with  Pulmonology or further investigation with pulmonary function testing, echocardiogram with bubble.     Clinical management of non-REM parasomnias.     Suggest optimizing sleep schedule and avoiding sleep deprivation.    Diagnostic Codes:   Sleep Hypoxemia G47.36   Repetitive Intrusions Into Sleep F51.8    10/23/2022 [facility] Sleep Study (180.0 lbs) - AHI 0.9, RDI 0.9, Supine AHI 0.9, REM AHI 2.2, Low O2 84.0%, Time Spent ?88% 59.4 minutes / Time Spent ?89% 207.0 minutes.    _____________________________________   Electronically Signed By: Choco Bolivar MD 10/31/2022

## 2022-11-03 ENCOUNTER — VIRTUAL VISIT (OUTPATIENT)
Dept: FAMILY MEDICINE | Facility: CLINIC | Age: 27
End: 2022-11-03
Payer: OTHER GOVERNMENT

## 2022-11-03 DIAGNOSIS — F43.9 STRESS: Primary | ICD-10-CM

## 2022-11-03 DIAGNOSIS — M25.531 RIGHT WRIST PAIN: ICD-10-CM

## 2022-11-03 PROCEDURE — 99213 OFFICE O/P EST LOW 20 MIN: CPT | Mod: 95 | Performed by: PHYSICIAN ASSISTANT

## 2022-11-03 NOTE — PROGRESS NOTES
"Ross is a 27 year old who is being evaluated via a billable video visit.      How would you like to obtain your AVS? MyChart  If the video visit is dropped, the invitation should be resent by: Text to cell phone: [phone]  Will anyone else be joining your video visit? No      Assessment & Plan     Stress  Follow up with psychology in person appointments- referred to [facility]  - Adult Mental Health  Referral    Right wrist pain  Recommended immobilizing wrist and thumb for 7-10 days- follow up with us if not improving         22 minutes spent on the date of the encounter doing chart review, history and exam, documentation and further activities per the note       BMI:   Estimated body mass index is 25.23 kg/m  as calculated from the following:    Height as of 7/28/22: 1.803 m (5' 11\").    Weight as of 7/28/22: 82.1 kg (180 lb 14.4 oz).   Weight management plan: Discussed healthy diet and exercise guidelines    Patient Instructions   Follow up with Radha Jalloh or another provider at [facility].  Return urgently if any change in symptoms.    Wear a wrist splint that immobilizes the thumb and wrist for 7-10 days  Apply heat to affected areas 15-20 minutes at a time several times a day  Follow up with us if no improvement in wrist pain in the next 10 days         Return in about 10 days (around 11/13/2022), or if symptoms worsen or fail to improve, for in person.    GERALDO [facility]   Ross is a 27 year old, presenting for the following health issues:  Consult      History of Present Illness       Reason for visit:  Personal therapy referral - in person treatment    He eats 4 or more servings of fruits and vegetables daily.He consumes 0 sweetened beverage(s) daily.He exercises with enough effort to increase his heart rate 60 or more minutes per day.  He exercises with enough effort to increase his heart rate 6 days per week. He is missing " 1 dose(s) of medications per week.  He is not taking prescribed medications regularly due to remembering to take.     Did virtual psychotherapy a couple times.  Basic stuff.  No real concerns   Only virtual therapy appointments available through [facility]  Didn't feel connection. Would like in person therapy for some situational stressors   Personal touch in person.    Already have conducted adult ADHD testing - through [facility]  No anxiety or depression. 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  Can fall asleep- history of sleep talking and walking extensively in sleep  Gets 8- 8.5 hours sleep a night.  Get up and walk every night.  Doesn't feel rested in AM.   Seeing urology for a Vasectomy consultation  Hurt wrist- fell awkwardly on it.  Badly sprained. Right wrist -dominant hand - any movement hurts- doesn't think fractured.  Not taking anything for it  No numbness or tingling         Review of Systems   Constitutional, HEENT, cardiovascular, pulmonary, gi and gu systems are negative, except as otherwise noted.      Objective           Vitals:  No vitals were obtained today due to virtual visit.    Physical Exam   GENERAL: Healthy, alert and no distress  EYES: Eyes grossly normal to inspection.  No discharge or erythema, or obvious scleral/conjunctival abnormalities.  RESP: No audible wheeze, cough, or visible cyanosis.  No visible retractions or increased work of breathing.    SKIN: Visible skin clear. No significant rash, abnormal pigmentation or lesions.  NEURO: Cranial nerves grossly intact.  Mentation and speech appropriate for age.  PSYCH: Mentation appears normal, affect normal/bright, judgement and insight intact, normal speech and appearance well-groomed.                Video-Visit Details    Video Start Time: 10:48 AM    Type of service:  Video Visit    Video End Time:10:56  BECCA    Originating Location (pt. Location): Other car outside of work        Distant Location (provider location):  Off-site    Platform used for Video Visit: Manuela

## 2022-11-03 NOTE — PATIENT INSTRUCTIONS
Follow up with Radha Jalloh or another provider at [facility].  Return urgently if any change in symptoms.    Wear a wrist splint that immobilizes the thumb and wrist for 7-10 days  Apply heat to affected areas 15-20 minutes at a time several times a day  Follow up with us if no improvement in wrist pain in the next 10 days

## 2022-11-07 ENCOUNTER — OFFICE VISIT (OUTPATIENT)
Dept: SLEEP MEDICINE | Facility: CLINIC | Age: 27
End: 2022-11-07
Payer: OTHER GOVERNMENT

## 2022-11-07 VITALS
DIASTOLIC BLOOD PRESSURE: 70 MMHG | RESPIRATION RATE: 16 BRPM | HEIGHT: 71 IN | OXYGEN SATURATION: 97 % | BODY MASS INDEX: 26.6 KG/M2 | SYSTOLIC BLOOD PRESSURE: 119 MMHG | WEIGHT: 190 LBS | HEART RATE: 54 BPM

## 2022-11-07 DIAGNOSIS — R06.83 SNORING: ICD-10-CM

## 2022-11-07 DIAGNOSIS — G47.36 HYPOXEMIA ASSOCIATED WITH SLEEP: Primary | ICD-10-CM

## 2022-11-07 PROCEDURE — 99215 OFFICE O/P EST HI 40 MIN: CPT | Performed by: NURSE PRACTITIONER

## 2022-11-07 ASSESSMENT — SLEEP AND FATIGUE QUESTIONNAIRES
HOW LIKELY ARE YOU TO NOD OFF OR FALL ASLEEP WHILE SITTING QUIETLY AFTER LUNCH WITHOUT ALCOHOL: WOULD NEVER DOZE
HOW LIKELY ARE YOU TO NOD OFF OR FALL ASLEEP WHEN YOU ARE A PASSENGER IN A CAR FOR AN HOUR WITHOUT A BREAK: SLIGHT CHANCE OF DOZING
HOW LIKELY ARE YOU TO NOD OFF OR FALL ASLEEP IN A CAR, WHILE STOPPED FOR A FEW MINUTES IN TRAFFIC: WOULD NEVER DOZE
HOW LIKELY ARE YOU TO NOD OFF OR FALL ASLEEP WHILE SITTING AND TALKING TO SOMEONE: WOULD NEVER DOZE
HOW LIKELY ARE YOU TO NOD OFF OR FALL ASLEEP WHILE SITTING INACTIVE IN A PUBLIC PLACE: WOULD NEVER DOZE
HOW LIKELY ARE YOU TO NOD OFF OR FALL ASLEEP WHILE SITTING AND READING: SLIGHT CHANCE OF DOZING
HOW LIKELY ARE YOU TO NOD OFF OR FALL ASLEEP WHILE WATCHING TV: MODERATE CHANCE OF DOZING
HOW LIKELY ARE YOU TO NOD OFF OR FALL ASLEEP WHILE LYING DOWN TO REST IN THE AFTERNOON WHEN CIRCUMSTANCES PERMIT: HIGH CHANCE OF DOZING

## 2022-11-07 NOTE — PATIENT INSTRUCTIONS

## 2022-11-07 NOTE — PROGRESS NOTES
Sleep Study Follow-Up Visit:    Date on this visit: 11/7/2022    Chago Soni is a 27-year-old male with a PMH pertinent for allergic rhinitis, chronic right shoulder pain, and sleepwalking who comes in today for follow-up of his sleep study done on 10/23/2022 at the [facility] [place] for possible sleep apnea and parasomnia. He reports with episodes of sleepwalking 1-3 times/night since age 7 yrs old. He reports very vivid dreams. He notes that with sleepwalking episodes or vivid dreams he awakens with heart racing and night sweats and is somewhat confused with these.  He recalls one episode where he took off all the pictures on the wall.  He notes that he has not ventured outside of his home with these episodes.  He denies PTSD symptoms.     Sleep latency 8.0 minutes without Ambien.  REM achieved.   REM latency 212.0 minutes.  Sleep efficiency 85.3%. Total sleep time 413.5 minutes.    Snoring - was reported as mild    Sleep architecture:  Stage 1, 9.2% (5%), stage 2, 59.5% (45-55%), stage 3, 11.9% (15-20%), stage REM, 19.5% (20-25%).  AHI was 0.9, with significant desaturations down to 84%. RDI 0.9.  REM RDI 2.2, consistent with no REM GELA.  Supine RDI 0.9, consistent with no SUPINE GELA.  Periodic Limb Movement Index 3.9/hour.       Movement Activity: Negative for movement abnormalities or parasomnia behaviors    Cardiac Summary: Sinus rhythm.    10/23/2022 [facility] Study (180.0 lbs) - AHI 0.9, RDI 0.9, Supine AHI 0.9, REM AHI 2.2, Low O2 84.0%, Time Spent ?88% 59.4 minutes / Time Spent ?89% 207.0 minutes.     These findings were reviewed with patient.     Past medical/surgical history, family history, social history, medications and allergies were reviewed.      Problem List:  Patient Active Problem List    Diagnosis Date Noted     Sleep walking 03/16/2022     Priority: Medium     Herpes simplex virus infection 11/12/2021     Priority: Medium     Seasonal allergic rhinitis  "due to pollen 07/06/2021     Priority: Medium     Allergic rhinitis due to animals 07/06/2021     Priority: Medium     Allergic rhinitis due to dust mite 07/06/2021     Priority: Medium     Allergic conjunctivitis, bilateral 07/06/2021     Priority: Medium     Chronic right shoulder pain 08/25/2020     Priority: Medium      Current Outpatient Medications   Medication     fluticasone (FLONASE) 50 MCG/ACT nasal spray     montelukast (SINGULAIR) 10 MG tablet     Current Facility-Administered Medications   Medication     betamethasone acet & sod phos (CELESTONE) injection 6 mg     ropivacaine (NAROPIN) injection 4 mL     /70   Pulse 54   Resp 16   Ht 1.803 m (5' 11\")   Wt 86.2 kg (190 lb)   SpO2 97%   BMI 26.50 kg/m      Impression/Plan:  1. Snoring  2. Hypoxemia associated with sleep  - General PFT Lab (Please always keep checked); Future  - We will investigate etiology of sleep associated hypoxemia; consider formal pulmonology evaluation/referral or further investigation with echocardiogram with bubble study should complete PFTs be normal.      This sleep study is negative for sleep apnea. This was a good test for sleep apnea that captured supine REM.     Sustained sleep associated hypoxemia was noted, with O2 saturations marginally below 88%.     There was no evidence of abnormal behaviors in sleep or clinically significant periodic limb movements.    Muscle atonia was preserved in REM and there was no evidence of dream enactment behavior.     There was a mildly increased frequency of spontaneous arousals and mildly reduced Stage N3. Sleep architecture was unremarkable otherwise.       He will follow up with me by telephone after his PFTs are completed to review the results and determine next steps.    Forty minutes spent with patient, all of which were spent face-to-face counseling, consulting, chart review/documentation, and coordinating plan of care on the date of the encounter.      George Wallace, " APRN CNP  Sleep Medicine      CC: Clinic, [place]     This note was written with the assistance of the Dragon voice-dictation technology software. The final document, although reviewed, may contain errors. For corrections, please contact the office.

## 2022-11-14 ENCOUNTER — VIRTUAL VISIT (OUTPATIENT)
Dept: UROLOGY | Facility: CLINIC | Age: 27
End: 2022-11-14
Attending: FAMILY MEDICINE
Payer: OTHER GOVERNMENT

## 2022-11-14 DIAGNOSIS — Z30.09 ENCOUNTER FOR VASECTOMY ASSESSMENT: ICD-10-CM

## 2022-11-14 PROCEDURE — 99203 OFFICE O/P NEW LOW 30 MIN: CPT | Mod: 95 | Performed by: UROLOGY

## 2022-11-14 NOTE — PROGRESS NOTES
Ross is a 27 year old who is being evaluated via a billable video visit.      How would you like to obtain your AVS? MyChart  If the video visit is dropped, the invitation should be resent by: Text to cell phone: [phone]  Will anyone else be joining your video visit? No              Subjective   Ross is a 27 year old, presenting for the following health issues:  Video Visit      HPI     He was requested to be seen by Dr. Hamilton for vasectomy consult.  He has no children.    Review of Systems   Constitutional, HEENT, cardiovascular, pulmonary, gi and gu systems are negative, except as otherwise noted.      Objective           Vitals:  No vitals were obtained today due to virtual visit.    Physical Exam   GENERAL: Healthy, alert and no distress  EYES: Eyes grossly normal to inspection.  No discharge or erythema, or obvious scleral/conjunctival abnormalities.  RESP: No audible wheeze, cough, or visible cyanosis.  No visible retractions or increased work of breathing.    SKIN: Visible skin clear. No significant rash, abnormal pigmentation or lesions.  NEURO: Cranial nerves grossly intact.  Mentation and speech appropriate for age.  PSYCH: Mentation appears normal, affect normal/bright, judgement and insight intact, normal speech and appearance well-groomed.        Discussed    That vasectomy is permanent method of birth control.    That vasectomy can fail due to recanalization of the vas even many months/years later.    That he needs 2 negative sperm checks to be considered sterile    That there are other methods that are not permanent and also that the sperm can be frozen for later use.    How the technique is performed, risks of infection, bleeding, damage to the testes vessels and testes atrophy    Long term complication such as chronic and difficult to treat testes pain and questionable increase incidence of prostate cancer    That the procedure can be done at the clinic or hospital OR        Plan:    Stop  Aspirin  Will schedule Vasectomy in the future          Video-Visit Details    Video Start Time: 830    Type of service:  Video Visit    Video End Time:8:35 AM    Originating Location (pt. Location): Home        Distant Location (provider location):  On-site    Platform used for Video Visit: Loren

## 2022-11-14 NOTE — PATIENT INSTRUCTIONS
[facility]  [address]  [place] [address]   Your Vasectomy is scheduled 1/17/2023 arrive 11:15.  Please call [phone] if you need to reschedule this appointment or if you have any questions.     Preparation for Vasectomy:  Eat and drink normally prior to appointment.   Shave the hair away from the base of your penis and around your testicles.  Wear snug underwear the day of the vasectomy to support your testicles.  Do not take aspirin, ibuprofen, advil, motrin, aleve products one week prior to your vasectomy.        General Vasectomy Information    Vasectomy is a surgery.  If it is successful, it will be impossible for you to ever father children.  The following information regards the male sterilization done by an operation called a vasectomy.  This is done in the physician's office.    The operation done to sterilize the male is easier, safer and much less expensive than the operation done to sterilize the woman.    Sterilization should be considered permanent.  For most males, once the operation is done, it can never me undone.  There are attempts made occasionally to reconnect the tubes that have been cut during the procedure, but this is very difficult and expensive and works only about 50-70% of the time.  In order for any of the physicians in our clinic to do a vasectomy, we require that you consider this a permanent form a sterilization.    A vasectomy can be done at any time, but it is best to think about having it done when you can take at least one day off from work and any excess activities.    Your decision to have a vasectomy should only be made with the following facts clear in mind.    1. First, a vasectomy is only one of several means of birth control.  Many form of temporary contraception are available.  If you have any questions about other methods, please discuss this with your physician.    2. A vasectomy may be unsuccessful in approximately one out of 1000 couples per  year.  This occurs when the tubes which are cut during the procedure reconnect themselves.  Sterility cannot be guaranteed.    3. You should be aware that it is the current belief of the medical profession that a vasectomy procedure does not alter a male physically, physiologically or sexually.  Because each person is a unique individual, there is always the possibility of an adverse phychiatric reaction.  This can be best avoided by being very comfortable in your own mind that you want to have this done, and that you do not want to father any children in the future.  If this is not clear in your mind, this should be further discussed with your physician.    4. You will not notice a change in the volume of your ejaculate since sperm is a very small amount of the semen and it is only the sperm that is stopped from entering the ejaculate after a vasectomy.  Your prostate and seminal vesicle glands really supply most of the semen and this is not at all decreased after a vasectomy.  Also there is no effect on the male hormones.    5. You do not become sterile immediately following a vasectomy due to the fact that there is still sperm remaining in your system that must be eliminated by ejaculation.  For this reason, your sexual partner could still become pregnant for a period of time following the vasectomy operation.  It is necessary that contraceptive measures be used until you receive confirmation from your physician that you are sterile.  It takes approximately 12 ejaculations to clear the semen of sperm, but this can differ in different men.  For this reason, it is very important that your semen be checked for sperm before you are considered sterile, and this should be done approximately 12 weeks after your vasectomy.      6. Vasectomy has risks and benefits.  Among the risks are possible complications resulting from the procedure.  These risks include but are not limited to:   A.  Bleeding, infection, or hematoma  occuring during or in the recovery period   from the procedure.   B.  Sperm granuloma or a small pea to walnut sized lump which is a collection of   scar tissue and sperm in your scrotal sack and remains permanently   C.  There may be an increased risk of prostate cancer although the data is   unclear.

## 2022-11-28 ENCOUNTER — PSYCHE (OUTPATIENT)
Dept: PSYCHOLOGY | Facility: CLINIC | Age: 27
End: 2022-11-28
Payer: OTHER GOVERNMENT

## 2022-11-28 DIAGNOSIS — F90.2 ADHD (ATTENTION DEFICIT HYPERACTIVITY DISORDER), COMBINED TYPE: Primary | ICD-10-CM

## 2022-12-05 NOTE — PROGRESS NOTES
22  Start 2:00 p.m.  End 3:55 p.m.  Chago Soni   1995  MRN [id number]    In person visit at [facility] for WAIS-IV assessment results below, formal report pending.    Wechsler Adult Intelligence Scale, Fourth Edition (WAIS-IV)  Standard scores from 85 - 115 represent the average range of functioning.  Scaled scores from 7 - 13 represent the average range of functioning.    Index Standard Score   Verbal Comprehension 127 (96th)   Perceptual Reasoning 100 (50th)   Working Memory 122 (93rd)   Processing Speed 124  (95th)   Full Scale  (92nd)     Subtest Scaled Score   Similarities 16   Vocabulary 16   Information 12   Block Design 11   Matrix Reasoning 11   Visual Puzzles 8   Digit Span    11   Arithmetic   17   Symbol Search 13   Coding 16     Start Time 2:00 pm End: 3:55 pm  Client was seen on 22 for administration of the WAIS-IV assessment.  Formal report to follow.    Martha Arvizu, Doctoral Student Psychology

## 2022-12-20 ENCOUNTER — VIRTUAL VISIT (OUTPATIENT)
Dept: PSYCHOLOGY | Facility: CLINIC | Age: 27
End: 2022-12-20
Payer: OTHER GOVERNMENT

## 2022-12-20 DIAGNOSIS — F90.2 ADHD (ATTENTION DEFICIT HYPERACTIVITY DISORDER), COMBINED TYPE: Primary | ICD-10-CM

## 2022-12-20 PROCEDURE — 96131 PSYCL TST EVAL PHYS/QHP EA: CPT | Mod: 95

## 2022-12-20 PROCEDURE — 96130 PSYCL TST EVAL PHYS/QHP 1ST: CPT | Mod: 95

## 2022-12-20 NOTE — PROGRESS NOTES
Progress Note  Disclaimer: Voice recognition software was used to generate this note. As a result, wrong word or 'sound-a-like' substitutions may have occurred due to the inherent limitations of voice recognition software. There may be errors in the script that have gone undetected. Please consider this when interpreting information found in this chart.       Client Name: Chago Soni  Date: 12/20/22      Service Type: Individual      Session Start Time: 12:56 pm  Session End Time: 1:45 pm     Session Length: 1 hour    Session #: 4    Attendees: Client attended alone    Service Modality:  Video Visit:      Provider verified identity through the following two step process.  Patient provided:  Patient is known previously to provider    Telemedicine Visit: The patient's condition can be safely assessed and treated via synchronous audio and visual telemedicine encounter.      Reason for Telemedicine Visit: Services only offered telehealth    Originating Site (Patient Location): Patient's home    Distant Site (Provider Location): Provider Remote Setting- Home Office    Consent:  The patient/guardian has verbally consented to: the potential risks and benefits of telemedicine (video visit) versus in person care; bill my insurance or make self-payment for services provided; and responsibility for payment of non-covered services.     Patient would like the video invitation sent by:  My Chart    Mode of Communication:  Video Conference via [facility]    Distant Location (Provider):  Off-site    As the provider I attest to compliance with applicable laws and regulations related to telemedicine.     Treatment Plan Last Reviewed: Today  PHQ-9 / PILO-7 : See Flowsheets    DATA  Interactive Complexity: NO  Crisis: NO     ADHD Assessment Report:  Patient: Chago Soni  YOB: 1995  MRN: [id number]  Referral Source: Jacqueline Hamilton MD  Reason for Referral: Assessment of  reported deficits in executive functioning    IDENTIFYING INFORMATION AND BRIEF HISTORY OF PRESENTING PROBLEM:    Chago Soni is a 27 is a 21-year-old, , heterosexual male who presented to initial diagnostic intake appointments on 10/17/22 and 10/25/22 due to primary concerns with concentration, task completion, and procrastination. The patient states that they have always had these problems and is now seeking diagnostic clarification.  As a child, prior to age 12, the patient stated that he struggled with several inattentive and hyperactivity/impulsivity symptoms including making careless mistakes/no attention to detail, difficulty sustaining attention, not seeming to listen to others, not following through on tasks, difficulty organizing tasks, avoiding tasks that require mental effort, often loses or misplaces items, easily distracted, forgetful, fidgety, often leaving seat, on the go/driven by a motor, talking excessively, blurting out comments, and having difficulty waiting his turn. He continues to struggle with the same symptoms currently stating that all symptoms began in elementary school, with the latest age of symptoms noted at age 10. Currently the patient is concerned about the impact of these on work, finances, education, and relationships.     Background/Developmental History:  The patient grew up with his biological parents and one sibling, his younger sister. He stated that his parents are still , and his childhood was good. The patient noted that his father has depression. In school the patient states he was social and popular, he enjoyed learning but not homework and even when his parents bought him special folders etc. to get organized, he was unable to follow through. The school noted that they thought the patient had ADHD, but the patient s parents did not want to have him medicated. The patient was able to perform well on standardized tests and stated that he was told he  wasn t working up to his potential,  about a million times.  The patient graduated from high school and went to college and then joined [employer] He struggled in college with sitting through lectures, sitting to read and write, completing homework, forgetting information, and concentrating. He did enjoy freedom in college with less supervision which was in sharp contrast to his experience in the . 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 He currently remains in the , is in a leadership role and loves his job and team. However, he still has trouble sitting and working through emails or other written/reading tasks finding that he needs to stand up and move every 20-30 minutes.     Significant Relationships and Supports:  The patient is in an intimate relationship with his partner of 2.5 years and stated their relationship is  fantastic.  He is also close to his family and has some very close friends.    Relationships with Family members: The patient indicated that these relationships were stable and meaningful.  Relationships with Peers: The patient indicated that these relationships were stable and meaningful.    Educational History: High school graduate, some college, training in the  and he wishes to complete his college degree but is concerned about the impact of his symptoms on successful completion.  Occupational History: The patient had worked in retail sales prior to joining the , he enjoyed the work, but it was not stable or financially satisfying.    Mental Health History: The patient has no mental health history on file with [facility], according to record review in care everywhere, some counseling had been provided for depressive symptoms through the [employer], however, the patient denies this happened and may be an error in the record. The patient stated he has never been diagnosed or  treated for depression.    Physical Health History: The patient is very physically fit, working out 6 x per week, he eats healthy foods but is bothered by sleep problems. He stated that he talks and walks in his sleep and that has been witnessed by his family and current partner. He has always had this problem. He was seen recently for sleep evaluation and did not demonstrate talking or walking in that assessment but maintains that he has the behaviors most nights.   Medications: Singular for allergies.    BEHAVIORAL OBSERVATIONS:  This patient is very restless, moving around often even though he was in his car for intake sessions. During the WAIS-IV assessment which is done in person, the patient needed to take 3-5 stretch breaks to continue through the subtests. He is hyperverbal and has experienced periods where he endorsed several manic symptoms.     Sources of Information & Assessment Measures:       [facility] [place], Medical Chart, Reviewed 10/17/22    CNS Vital Signs, Completed 10/25/22     [place] Multiphasic Personality Inventory-2nd Edition, Completed 10/24/22     BAARS-IV: Other-Report: Childhood Symptoms, completed by partner Coby Garcia, Dated 10/20/22    BFIS-LF: Other-Report, completed by partner Coby Garcia, Dated 10/20/22    BAARS-IV: Other-Report: Current Symptoms, completed by partner Coby Garcia , Dated 10/20/22    Patient Health Questionnaire 9- Item Scale, completed 10/19/22    Generalized Anxiety Disorder 7-Item Scale, completed 10/19/22    BDEFS-LF: Other-Report, completed by partner Coby Garcia, Dated 10/20/22    BFIS-LF: Self-Report, Dated 10/19/22    BDEFS-LF: Self-Report, Dated 10/19/22    BAARS-IV: Self-Report: Current Symptoms, Dated 10/19/22    BAARS-IV: Self-Report: Childhood Symptoms, Dated 10/19/22    Clinical Interviews, conducted 10/17/22, 10/25/22    CAGE AID, Completed 10/17/22    WAIS-IV, completed 12/4/22      TEST RESULTS: Test results comprise a hypothesis of the patient s  mental health concerns  and are not an independent or conclusive assessment. Test results are combined with the  patient s available medical, psychological, behavioral, and observational data for an integrated  interpretation and report.    CNS Vital Signs Neurocognitive Battery  The CNS Vital Signs Neurocognitive Battery is a remotely administered assessment comprised  of seven core subtests to individually measure the patient s verbal memory, visual memory,  motor speed, psychomotor speed, reaction time, focus, ability to sustain attention and ability to  adapt to changing rules and tasks.    The CNS Vital Signs Neurocognitive Battery is a remotely administered assessment comprised  of seven core subtests to individually measure the patient s verbal memory, visual memory,  motor speed, psychomotor speed, reaction time, focus, ability to sustain attention and ability to  adapt to changing rules and tasks.  Above average domain scores indicate a standard score (SS) greater than 109 or a Percentile  Rank (NM) greater than 74, indicating a high functioning test subject.   Average is a SS  or NM 25-74, indicating normal function.   Low Average is a SS 80-89 or NM 9-24 indicating a slight deficit or impairment.   Below Average is a SS 70-79 or NM 2-8, indicating a moderate level of  deficit or impairment.   Very Low is a SS less than 70 or a NM less than 2, indicating a deficit and  impairment.   The validity indicator for this battery shows which areas are valid and interpretable.              Component Test Valid (Y/N) Description Score Percentile Range   Neurocognitive Index  Y Measures an average score derived from the domain scores or a general assessment of the overall neurocognitive status of the patient. 108 70th  Average   Composite Memory Y Measures how well subject can recognize, remember, and retrieve words and geometric figures and is comprised of the Visual and Verbal Memory domains. 94 34th  Average    Verbal Memory Y Measures how well subject can recognize, remember, and retrieve words.  100 50th  Average   Visual Memory Y Measures how well subject can recognize, remember, and retrieve geometric  figures. 91 27th  Average   Psychomotor Speed Y Measures how well a subject perceives, attends, responds to complex visual-perceptual information and performs simple fine motor coordination, and is comprised of the Motor Speed and Processing Speed indexes 101 53rd  Average   Reaction Time Y Measures how quickly the subject can react, in milliseconds, to a simple and increasingly complex direction set. 105 63rd  Average   Complex Attention Y Measures the ability to track and respond to a variety of stimuli over lengthy periods of time and/or perform complex mental tasks requiring vigilance quickly and accurately. 113 81st  Above Average   Cognitive Flexibility Y Measures how well subject is able to adapt to rapidly changing and increasingly complex set of directions and/or to manipulate the information. 125 95th Above Average   Processing Speed Y Measures how well a subject recognizes and processes information i.e., perceiving, attending/responding to incoming information, motor speed, fine motor coordination, and visual-perceptual ability. 102 55th  Average   Executive Function Y Measures how well a subject recognizes rules, categories, and manages or navigates rapid decision making. 125 95th  Average   Simple Attention Y Measures the ability to track and respond to a single defined stimulus over lengthy periods of time while performing vigilance and response inhibition quickly and accurately to a simple task. 108 70th Average   Motor Speed Measure Y Ability to perform simple movements to produce and satisfy an intention towards a manual action and goal. 100 50th  Average     Diagnostic impression:  Complex attention, simple attention, executive function, and cognitive flexibility are all in the average or above  average ranges. Memory measures and processing speed are lower than these but still in the average range. Further testing with the WAIS-IV was added to further assess for ADHD.    The WAIS-IV is an individually administered instrument designed to assess the intellectual abilities of examinees. It provides scores that represent intellectual functioning in four specific cognitive domains: Verbal Comprehension, Perceptual Organization, Working Memory, and Processing Speed. The results from testing are specified below:    WAIS-IV Index Standard Score Percentile Descriptive Category   Verbal Comprehension (VCI) 127 96th  Superior (Well Above Average)   Perceptual Reasoning (JAMES) 100 50th  Average   Working Memory (WMI) 123 93rd  Superior (Well Above Average)   Processing Speed (PSI) 124 95th  Superior (Well Above Average)   Full Scale Intelligence Quotient (FSIQ) 121 92nd  Superior (Well Above Average)       Subtests Raw Score Scaled Score Percentile   VCI Subtests      Similarities 34 16 98th    Vocabulary 52 16 98th    Information 16 12            75th      JAMES Subtests      Block Design 49 11 63rd    Matrix Reasoning 20 11 63rd    Visual Puzzles 13 8 25th    WMI Subtests      Digit Span 31 11 63rd    Arithmetic 21 17 99th    PSI Subtests      Symbol Search 43 13 84th    Coding 101 16 98th      The client s test data indicate some variability when comparing the index scores, but the Full-Scale Intelligence Quotient (FSIQ) is interpretable (FSIQ = 121, 92nd percentile) and is in the superior range.   The VCI is a measure of verbal concept formation, verbal reasoning, and knowledge acquired from one s environment. Test data indicate the patient s verbal abilities are in the superior range (VCI SS = 127, 96th percentile). His performance on the subtests in this index shows consistency.  The JAMES is a measure of perceptual and fluid reasoning, spatial processing, and visual-motor integration. The patient s score of (JAMES SS  = 100, 50th percentile) is in the average range.  The patient s performance on the subtests indicates some variability with the Visual Puzzles scoring much lower than the other two subtests.  The WMI provides a measure of the examinee s working memory abilities. Working memory tasks require the ability to temporarily retain information in memory, perform some mental operation on, or manipulation of, it and produce a result. Test data indicate that the patient s overall working memory abilities are in the superior range (WMI SS = 123, 93rd percentile). The performance is based on consistency on subtests, with a perfect score on the Arithmetic subtest.  The PSI provides a measure of the examinee s ability to quickly and correctly scan, sequence, or discriminate simple visual information. Test data indicate the patient s overall processing abilities are in the superior range (PSI SS = 124, 95th percentile). During both subtests, the subject was very intently focused on the tasks, moving quickly but making only one error on Symbol Search.  Diagnostic Impression:  This patient is in the superior range for three of four indexes on the WAIS-IV. The fourth index, perceptual reasoning, has consistency between two subscales and one subscale, visual puzzles, is below the other two. The FSIQ is in the superior or well above average range and may indicate that the patient has developed strategies to cope with ADHD symptoms. The JAMES is significantly lower than the other 3 indexes even though it is in the average range. Given the superior intellectual ability demonstrated on the WAIS-IV, the patient performed well on tests that would more readily discriminate ADHD in subjects with normal range abilities. Attention-deficit hyperactivity disorder (ADHD) cannot be ruled out as a factor impacting symptoms since childhood.     [place] Adult ADHD Rating Scale-IV: Self and Other Reports (BAARS-IV):  The BAARS-IV assesses for symptoms of  ADHD that are experienced in one s daily life. This  assessment measure includes self and collateral rating scales designed to provide information  regarding current and childhood symptoms of ADHD including inattention, hyperactivity, and  impulsivity. Self-report scores are reported as percentiles.  Scores at the 76th-83rd percentile are considered marginal  Scores at the 84th-92nd percentile are considered borderline  Scores at the 93rd-95th percentile are considered mild  Scores at the 96th-98th percentile are considered moderate  Scores at the 99th percentile are considered severe.     Collateral or other rating scales are reported as number of symptoms observed in comparison to those  reported by the client. Norms and percentile scores are not available for collateral reports.    Current Symptoms Scale --Self-report: The results indicate that the client s Total ADHD Score was 70 placing the patient in the 99th percentile for overall ADHD symptoms. In addition, the client endorsed 8/9 (98th percentile) Inattention symptoms, 9/9 (99th percentile) Hyperactivity-Impulsivity symptoms, and 4/9 (93rd percentile) Sluggish Cognitive Tempo symptoms. The patient indicated that the reported symptoms have resulted in impaired functioning in school, home, work, and social relationships. Overall, the results suggest the client is experiencing severe ADHD symptoms.    Current Symptoms Scale--Other Report: Client s partner completed the collateral report inventory of current symptoms. Based on the collateral contact s observation of symptoms, the client demonstrates 2/9 Inattention symptoms, 4/5 Hyperactivity symptoms, 3/4 Impulsivity symptoms, and 0/9 Sluggish Cognitive Tempo symptoms. The client s Total ADHD Score was 44. The collateral contact indicated the client demonstrates impaired functioning in school, home, and work. The collateral- and self-report scores are were significantly different for inattention  symptoms.    Childhood Symptoms Scale--Self-Report:  Client completed the self-report inventory of childhood symptoms. The results indicate that the  client s Total ADHD Score was 70 placing the patient in the 99th percentile for overall  ADHD symptoms in childhood. In addition, the client endorsed 8/9 (98th percentile) Inattention  symptoms and 9/9 (99th percentile) Hyperactivity-Impulsivity symptoms. Client indicated that  the reported symptoms resulted in impaired functioning in school, home, work, and social  relationships. Overall, the results suggest the client experienced severe symptoms of ADHD as a  child.    Childhood Symptoms Scale--Other Report: Client s partner completed the collateral report inventory of childhood symptoms. Based on the collateral contact s recollection of client endorsed / childhood symptoms, the client demonstrated 1/9 Inattention symptoms and 4/9 Hyperactivity-Impulsivity symptoms. The client s Total ADHD score was 39. The collateral contact indicated the client demonstrates impaired functioning in school, home, and social relationships. The collateral- and self-report scores are significantly different for inattentive symptoms.    [place] Functional Impairment Scale: Self and Other Reports (BFIS)  The BFIS is used to assess an individual s psychosocial impairment in major life/daily activities  that may be due to a mental health disorder. This assessment measure includes self and  collateral rating scales. Self-report scores are reported as percentiles.   Scores at the 76th-83rd percentile- considered marginal  Scores at the 84th -92nd percentile- considered borderline  Scores at the 93rd-95th percentile- considered mild  Scores at the 96th-98thpercentile -considered moderate  Scores at the 99th percentile- considered severe. Collateral or other rating scales are reported as number of symptoms observed in comparison to those reported by the client.     Results indicate the client  identified impairment (scores at or greater than 93rd percentile) in the following areas: home chores, work, and education. The client s Mean Impairment Score was 4.36 (76th to 84th percentile) indicating the client is reporting borderline impairment in functioning across domains. Client s partner completed the collateral rating scale, which indicated discrepant results. The collateral contacts scores were generally lower than the client s report.    [place] Deficits in Executive Functioning Scale (BDEFS)  The BDEFS is a measure used for evaluating dimensions of adult executive functioning in daily  life. This assessment measure includes self and collateral rating scales. Self-report scores are  reported as percentiles.  Scores at the 76th-83rd percentile -considered marginal  Scores at the 84th-92nd percentile -considered borderline  Scores at the 93rd-95th percentile -considered mild  Scores at the 96th-98th percentile -considered moderate  Scores at the 99th percentile- considered severe.   Collateral or other rating scales are reported as number of symptoms observed in comparison to those reported by the client. Norms and percentile scores are not available for collateral reports.    Results indicate the client s Total Executive Functioning Score was 212 (95th percentile). The  ADHD-Executive Functioning Index score was 31 (98th percentile). These scores suggest the  client has mild deficits in executive functioning. Results indicate the client identified significant  deficits in the following areas: self-management to time (severe deficits) and self-motivation (severe deficits). Client s partner completed the collateral rating scale, which indicated discrepant results with values lower than the client values. The symptoms indicate borderline-mild ADHD.      Generalized Anxiety Disorder Questionnaire (PILO-7)  This questionnaire is designed to assess for anxiety in adults. Based on the score, the patient  is  experiencing mild symptoms of anxiety. Client identified the following symptoms of anxiety:  feeling on edge/nervous/anxious, difficulty controlling worry, worrying about many different  things, trouble relaxing, and being restless, the score was 7 in the mild range.      Patient Health Questionnaire- 9 (PHQ-9)  This questionnaire is designed to assess for depression in adults. Based on the score, the  patient is experiencing moderate symptoms of depression. Client identified the following symptoms of  depression: difficulty with sleep, poor concentration, and psychomotor agitation the score was 11 in the     moderate range.    [place] Multiphasic Personality Inventory , 2nd edition (MMPI-2)  The MMPI-2 is a psychometric instrument designed to provide information regarding personality and character traits that might be contributing to psychological distress and to illuminate personality patterns. Assessment results are one piece of information in conceptualizing a diagnosis and should be combined with other data, information, and context.   Validity profile  indicates that the patient appears to have answered in a generally straightforward and  consistent manner, and obtained results were measured as reliable and valid in representing  current psychological status. One item was omitted.    Emotional Well-being: At this time, the patient is likely experiencing some depression and  anxiety symptoms. For him, these likely manifest as feeling blue, lack of drive, trouble concentrating and irritability.     Cognitions: Emotional difficulties may be further manifesting as concentration, memory, and  attention problems. He probably feels mentally slowed down. The patient is likely engaging in  rumination and probably has self-deprecating thoughts.    Behaviors: The patient has been cooperative throughout his assessment, somewhat restless with rapid speech. When completing the assessment in person for the WAIS-IV, the  client needed to take several stretch breaks to maintain concentration.     Interpersonal Style: The patient is outgoing, positive and a good historian. He stated he is usually a leader in most groups and is socially active with family and friends. He has a strong support network.    SUMMARY:    Chago Soni is a [age]-year-old, , heterosexual male who presented with ADHD symptoms of both inattentive and hyperactivity/impulsivity since he was in elementary school.  The patient was seeking help for primary concerns with concentration, task completion, and procrastination. The patient states that they have always had these problems and is now seeking diagnostic clarification.  The current and historical symptoms endorsed by the patient indicate moderate depression with anxious symptoms and borderline-mild ADHD. Diagnostic testing results were equivocal for ADHD with symptoms indicating severe deficits and objective measures showing performance in normal ranges. The diagnostic picture is confounded by the patient s reported sleep issues, and he is working with the Sleep Disorders service for diagnostic support. Neurodevelopmental problems with inattentive and hyperactivity/impulsivity cannot be ruled out as part of the cause of the patient s symptoms.   Referral Question Response: DSM-5 criteria for ADHD:  A. Symptom Count - Are there sufficient symptoms for the diagnosis? Yes, the patient endorsed sufficient symptoms.  B. Onset - Were several symptoms present before 12 years of age? Yes, the patient reported that most symptoms began around age 8-10.  C. Pervasiveness - Are several symptoms present in at least two settings? Yes, the patient reported problems at home, work, school and in relationships.  D. Impairment - Do symptoms interfere with or reduce the quality of functioning? Yes, have interfered with school, work, home, and relationships.  E. Exclusions - Are symptoms better explained by another  disorder or factor? No, but the current mood symptoms and difficulty with restful sleep likely are exacerbating the ADHD symptoms.  Diagnostic Impression:   The patient meets the following DSM-5 criteria for Attention-deficit Hyperactivity Disorder- Combined Presentation, borderline-mild.    Careless mistakes/inattention to detail, difficulty sustaining attention, not listening when spoken to, not following through on work and chores, difficulty organizing tasks, avoiding tasks that require mental effort, often loses or misplaces items, easily distracted, forgetful, fidgety, often leaves seat, driven by a motor, talking excessively, blurting out comments, cannot wait turn.    The patient meets the following DSM-5 criteria for major depression disorder, moderate single episode.   Low mood, diminished interest in activities, sleep problems, fatigue, concentration problems, agitation.  The symptoms endorsed by patients with ADHD and depression overlap. The following recommendations fit for depression and anxiety as well. The patient has chronic insomnia and endorses Non-REM sleep disturbance of sleep walking and talking.  Formal sleep study ruled out GELA and noted periodic leg movements and periods of low oxygen saturation. Reported sleep walking and talking was not observed in the study. Referrals were made to Neurology and Pulmonary for follow up.  DIAGNOSES:  F32.1 Major Depressive Disorder, single episode, moderate in partial remission  F90.2 Attention-Deficit Hyperactivity Disorder Combined type, borderline-mild presentation  PLAN OF CARE:  1. Discuss the following with your primary care provider and try to choose one provider to manage multiple areas of care:  a. Discuss the medications with your primary care provider to manage mood disorders combined with ADHD diagnosis, if medication use is determined, a slow, gradual approach is recommended due to manic symptoms endorsed by the patient.  2. Begin individual  psychotherapy to help alleviate mood symptoms and manage ADHD challenges.     RECOMMENDATIONS:  1. Due to the patient s reported attention, concentration, and mood difficulties, the following  health/lifestyle changes when combined, can significantly improve symptoms:  a. Avoid simple carbohydrates at breakfast. Aim for only complex carbohydrates and  lean protein for your morning meal.  b. Engage in aerobic exercise 3 times per week for 30 minutes, ensuring that your heart  rate stays within your training zone. Further, reading the book,  Spark,  by Osmel Hager M.D can help the patient understand the benefits of exercise on the brain.  c. Research suggest that taking a high-quality multi-vitamin and antioxidant (1/2 cup of  blueberries) daily in conjunction with balanced nutrition can be helpful.  d. Aim for the high end of daily water intake: around 72 ounces per day.  e. Ensure regular meals and snacks to maintain optimal attention.  2. The following may be beneficial in managing some of the patient s attention and  concentration difficulties:  a. Due to the patient s difficulties with attention and concentration, consider working in a  completely distraction-free area while completing tasks. Workspaces should be  completely clear except for the materials needed for the current task. Both visual and  auditory distractions should be decreased as much as possible.  b. Considering decreased ability to focus and maintain attention, it is recommended  that the patient takes frequent breaks while completing tasks. This will help to  maintain attention and effort. The patient may benefit from the use of a [employer]  Timer. The timer works by using built-in break times. After working on a task  consistently for 25 minutes, the timer reminds the user to take a five-minute break  before continuing, etc. A [employer] timer can be downloaded as a free mellissa to a  phone or tablet.  c. Due to the patient s attentional and  concentration symptoms, it is recommended to  increase organization with the use of lists and calendars. Significantly increasing  structure to the day and adhering to a set schedule can increase your ability to  complete responsibilities, track deadline, etc. Breaking these tasks down into the  component parts and recording them in a calendar/planner will likely be beneficial.  Patient would benefit from setting feasible timelines for completion of activities. By  establishing clear priorities for completing tasks, you can more likely complete the  most important tasks first. The patient may also choose to elect to a friend or family  member to help hold them accountable.  3. Avoid multitasking. Attempting to work on multiple tasks and projects the same time increases  the likelihood that an error will occur. Focus on one task at a time.  4. Due to the patient s difficulties with sleep, it recommended to engage in a relaxing activity up  to the point of sleep. The patient may want to spend time reading, drawing/ coloring,  practicing mindfulness, listening (not watching) to podcasts, music, etc., or try the  [employer] mellissa, which is free to download and may aid falling asleep more easily.  5. The patient may benefit from engaging in mindfulness practices. This may include breathing  techniques, apps that provide guided meditation, or more interactive activities such as  coloring.  Additional resources for ADHD:  The Couple's Guide to Thriving with ADHD Paperback - April 1, 2014     Ann Mullins (Author) Shanae BRIGHT (Author) *also now a workbook for couples to use together       The ADHD Effect on Marriage: Understand and Rebuild Your Relationship in Six Steps Paperback - September 1, 2010   Ann Mullins (Author), Wilfred Marino (Foreword)   ADD-Friendly Ways to Organize Your Life by Jenn Wick and Mary Jane Steele  Sep 22, 2016               DATA   ADHD Assessment feedback session.  Reviewed results of testing. See [facility] extended documentation for results. Explained diagnosis and treatment options. Recommended medication evaluation be scheduled with PCP. Also provided and reviewed ADHD patient handout. Pt will schedule follow-up with me after seeing PCP.     Progress Since Last Session (Related to Symptoms / Goals / Homework):   Symptoms: No change stable    Homework: Achieved / completed to satisfaction      Episode of Care Goals: Satisfactory progress - ACTION (Actively working towards change); Intervened by reinforcing change plan / affirming steps taken     Current / Ongoing Stressors and Concerns:   Difficulty with attention and concentration     Treatment Objective(s) Addressed in This Session:   Reviewed results of testing, provided ADHD Psychoeducation tips and resources, encouraged client to make appointment for medication evaluation.       Intervention:   psychoeducation regarding ADHD        ASSESSMENT: Current Emotional / Mental Status (status of significant symptoms):   Risk status (Self / Other harm or suicidal ideation)   Client denies current fears or concerns for personal safety.   Client denies current or recent suicidal ideation or behaviors.   Client denies current or recent homicidal ideation or behaviors.   Client denies current or recent self injurious behavior or ideation.   Client denies other safety concerns.   Recommended that patient call 911 or go to the local ED should there be a change in any of these risk factors.     Appearance:   Appropriate    Eye Contact:   Fair    Psychomotor Behavior: Normal  Restless    Attitude:   Cooperative  Indifferent   Orientation:   All   Speech    Rate / Production: Normal     Volume:  Normal    Mood:    Normal   Affect:    Appropriate  Blunted    Thought Content:  Clear    Thought Form:  Coherent  Logical    Insight:    Good  and Fair      Medication Review:   No current psychiatric medications prescribed     Medication  Compliance:   NA     Changes in Health Issues:   None reported     Chemical Use Review:   Substance Use: Chemical use reviewed, no active concerns identified      Tobacco Use: No current tobacco use.       Collateral Reports Completed:   Routed note to PCP    PLAN: (Client Tasks / Therapist Tasks / Other)  Reviewed all assessment results with the patient. Answered questions and made recommendation for next step. Formal report in this note.        Martha Arvizu, Doctoral Student     Supervisor [employer]    Psychological Testing   Billing/Services Summary       Initial interview/Record Review  1 hour 90834    Intake  1 hour 40877    Interactive feedback Session 1 hour 94627    Testing Evaluation Services Base: 98435  (1st 60 mins) Add-on: 55327  (each addtl 60 mins)   Test Scoring and Interpretation  (Start/Stop), (Date, Day #) 240 minutes   Integration/Report Generation   (Start/Stop), (Date, Day #) 180 minutes   Clinical Decision Making/Battery Modification   (Start/Stop), (Date, Day #) 60 minutes   Post-Service Work   (Start/Stop), (Date, Day #) 60 minutes   Total Time: 540  minutes (9 hours, 00 minutes)   Total Units:              Diagnosis(es): (ICD-10) F90.2,  F32.1

## 2022-12-28 ENCOUNTER — VIRTUAL VISIT (OUTPATIENT)
Dept: FAMILY MEDICINE | Facility: CLINIC | Age: 27
End: 2022-12-28
Payer: OTHER GOVERNMENT

## 2022-12-28 DIAGNOSIS — F90.2 ATTENTION DEFICIT HYPERACTIVITY DISORDER (ADHD), COMBINED TYPE: Primary | ICD-10-CM

## 2022-12-28 PROCEDURE — 99213 OFFICE O/P EST LOW 20 MIN: CPT | Mod: 95 | Performed by: NURSE PRACTITIONER

## 2022-12-28 RX ORDER — DEXTROAMPHETAMINE SACCHARATE, AMPHETAMINE ASPARTATE MONOHYDRATE, DEXTROAMPHETAMINE SULFATE AND AMPHETAMINE SULFATE 2.5; 2.5; 2.5; 2.5 MG/1; MG/1; MG/1; MG/1
10 CAPSULE, EXTENDED RELEASE ORAL DAILY
Qty: 30 CAPSULE | Refills: 0 | Status: SHIPPED | OUTPATIENT
Start: 2022-12-28 | End: 2023-01-23 | Stop reason: DRUGHIGH

## 2022-12-28 NOTE — PROGRESS NOTES
Ross is a 27 year old who is being evaluated via a billable video visit.      How would you like to obtain your AVS? MyChart  If the video visit is dropped, the invitation should be resent by: Text to cell phone: [phone]  Will anyone else be joining your video visit? No        Assessment & Plan     Attention deficit hyperactivity disorder (ADHD), combined type  Discussed treatment options, risks/benefits. Start the below. Follow up 1 month.  - amphetamine-dextroamphetamine (ADDERALL XR) 10 MG 24 hr capsule; Take 1 capsule (10 mg) by mouth daily             See Patient Instructions    Return in about 4 weeks (around 1/25/2023).     The benefits, risks and potential side effects were discussed in detail. Black box warnings discussed as relevant. All patient questions were answered. The patient was instructed to follow up immediately if any adverse reactions develop.    Return precautions discussed, including when to seek urgent/emergent care.    Patient verbalizes understanding and agrees with plan of care.     PAULA SZYMANSKI [facility]   Ross is a 27 year old, presenting for the following health issues:  A.D.H.D      History of Present Illness       Reason for visit:  I just completed my testing for ADHD and was diagnosed with mild adhd after fairly extensive testing. I have tried other prescriptions such as Straterra from a Dr. but it did nothing for me and they wouldn t prescribe anything else without a diagnosis.    He eats 4 or more servings of fruits and vegetables daily.He consumes 0 sweetened beverage(s) daily.He exercises with enough effort to increase his heart rate 60 or more minutes per day.  He exercises with enough effort to increase his heart rate 7 days per week.   He is taking medications regularly.     Recently dx with mild ADHD and depression symptoms. Patient reports depression symptoms related to ADHD being unmanaged. Doesn't feel  depressed.  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 Never more than 1-2            Review of Systems   Constitutional, HEENT, cardiovascular, pulmonary, gi and gu systems are negative, except as otherwise noted.      Objective           Vitals:  No vitals were obtained today due to virtual visit.    Physical Exam   GENERAL: Healthy, alert and no distress  EYES: Eyes grossly normal to inspection.  No discharge or erythema, or obvious scleral/conjunctival abnormalities.  RESP: No audible wheeze, cough, or visible cyanosis.  No visible retractions or increased work of breathing.    SKIN: Visible skin clear. No significant rash, abnormal pigmentation or lesions.  NEURO: Cranial nerves grossly intact.  Mentation and speech appropriate for age.  PSYCH: Mentation appears normal, affect normal/bright, judgement and insight intact, normal speech and appearance well-groomed.                Video-Visit Details    Type of service:  Video Visit     Originating Location (pt. Location): Home  Distant Location (provider location):  On-site  Platform used for Video Visit: Manuela

## 2022-12-30 DIAGNOSIS — G47.36 HYPOXEMIA ASSOCIATED WITH SLEEP: ICD-10-CM

## 2022-12-30 PROCEDURE — 94726 PLETHYSMOGRAPHY LUNG VOLUMES: CPT | Performed by: INTERNAL MEDICINE

## 2022-12-30 PROCEDURE — 94375 RESPIRATORY FLOW VOLUME LOOP: CPT | Performed by: INTERNAL MEDICINE

## 2022-12-30 PROCEDURE — 94729 DIFFUSING CAPACITY: CPT | Performed by: INTERNAL MEDICINE

## 2023-01-01 LAB
DLCOUNC-%PRED-PRE: 115 %
DLCOUNC-PRE: 39.4 ML/MIN/MMHG
DLCOUNC-PRED: 33.99 ML/MIN/MMHG
ERV-%PRED-PRE: 103 %
ERV-PRE: 1.87 L
ERV-PRED: 1.8 L
EXPTIME-PRE: 6.51 SEC
FEF2575-%PRED-PRE: 86 %
FEF2575-PRE: 4.18 L/SEC
FEF2575-PRED: 4.85 L/SEC
FEFMAX-%PRED-PRE: 87 %
FEFMAX-PRE: 9.14 L/SEC
FEFMAX-PRED: 10.46 L/SEC
FEV1-%PRED-PRE: 92 %
FEV1-PRE: 4.38 L
FEV1FEV6-PRE: 82 %
FEV1FEV6-PRED: 84 %
FEV1FVC-PRE: 82 %
FEV1FVC-PRED: 83 %
FEV1SVC-PRE: 80 %
FEV1SVC-PRED: 80 %
FIFMAX-PRE: 8.9 L/SEC
FRCPLETH-%PRED-PRE: 84 %
FRCPLETH-PRE: 2.85 L
FRCPLETH-PRED: 3.37 L
FVC-%PRED-PRE: 93 %
FVC-PRE: 5.35 L
FVC-PRED: 5.7 L
IC-%PRED-PRE: 88 %
IC-PRE: 3.64 L
IC-PRED: 4.12 L
RVPLETH-%PRED-PRE: 57 %
RVPLETH-PRE: 0.98 L
RVPLETH-PRED: 1.73 L
TLCPLETH-%PRED-PRE: 88 %
TLCPLETH-PRE: 6.49 L
TLCPLETH-PRED: 7.33 L
VA-%PRED-PRE: 95 %
VA-PRE: 6.53 L
VC-%PRED-PRE: 92 %
VC-PRE: 5.51 L
VC-PRED: 5.93 L

## 2023-01-17 ENCOUNTER — OFFICE VISIT (OUTPATIENT)
Dept: UROLOGY | Facility: CLINIC | Age: 28
End: 2023-01-17
Payer: OTHER GOVERNMENT

## 2023-01-17 DIAGNOSIS — Z30.2 ENCOUNTER FOR STERILIZATION: Primary | ICD-10-CM

## 2023-01-17 PROCEDURE — 88302 TISSUE EXAM BY PATHOLOGIST: CPT | Performed by: PATHOLOGY

## 2023-01-17 PROCEDURE — 99000 SPECIMEN HANDLING OFFICE-LAB: CPT | Performed by: UROLOGY

## 2023-01-17 PROCEDURE — 55250 REMOVAL OF SPERM DUCT(S): CPT | Performed by: UROLOGY

## 2023-01-23 ENCOUNTER — VIRTUAL VISIT (OUTPATIENT)
Dept: FAMILY MEDICINE | Facility: CLINIC | Age: 28
End: 2023-01-23
Payer: OTHER GOVERNMENT

## 2023-01-23 DIAGNOSIS — F90.2 ATTENTION DEFICIT HYPERACTIVITY DISORDER (ADHD), COMBINED TYPE: Primary | ICD-10-CM

## 2023-01-23 PROCEDURE — 99213 OFFICE O/P EST LOW 20 MIN: CPT | Mod: 95 | Performed by: NURSE PRACTITIONER

## 2023-01-23 RX ORDER — DEXTROAMPHETAMINE SACCHARATE, AMPHETAMINE ASPARTATE MONOHYDRATE, DEXTROAMPHETAMINE SULFATE AND AMPHETAMINE SULFATE 5; 5; 5; 5 MG/1; MG/1; MG/1; MG/1
20 CAPSULE, EXTENDED RELEASE ORAL DAILY
Qty: 30 CAPSULE | Refills: 0 | Status: SHIPPED | OUTPATIENT
Start: 2023-01-23 | End: 2023-02-09

## 2023-01-23 NOTE — PROGRESS NOTES
Ross is a 27 year old who is being evaluated via a billable video visit.      How would you like to obtain your AVS? MyChart  If the video visit is dropped, the invitation should be resent by: Text to cell phone: [phone]  Will anyone else be joining your video visit? No          Assessment & Plan     Attention deficit hyperactivity disorder (ADHD), combined type  Increasing to XR 20 mg. Follow up 1 month - if going well, ok to MyChart. If needs adjustment, should schedule virtual visit to discuss.  - amphetamine-dextroamphetamine (ADDERALL XR) 20 MG 24 hr capsule; Take 1 capsule (20 mg) by mouth daily           Return in about 4 weeks (around 2/20/2023).     The benefits, risks and potential side effects were discussed in detail. Black box warnings discussed as relevant. All patient questions were answered. The patient was instructed to follow up immediately if any adverse reactions develop.    Return precautions discussed, including when to seek urgent/emergent care.    Patient verbalizes understanding and agrees with plan of care.     PAULA SZYMANSKI [facility]   Ross is a 27 year old, presenting for the following health issues:  A.D.H.D      History of Present Illness       Reason for visit:  Precription change/refill    He eats 2-3 servings of fruits and vegetables daily.He consumes 0 sweetened beverage(s) daily.He exercises with enough effort to increase his heart rate 60 or more minutes per day.  He exercises with enough effort to increase his heart rate 6 days per week. He is missing 1 dose(s) of medications per week.  He is not taking prescribed medications regularly due to other.       Medication Followup of Adderall    Taking Medication as prescribed: yes    Side Effects:  None    Medication Helping Symptoms:  Yes, slightly helping.    Taking Adderall XR 10 mg  Symptoms are better but still room for improvement  Takes almost daily  Focus better but  not enough  Energy level is more consistent  More productive  No side effects  Sleep has improved  Appetite is good/same  Into health/fitness and tracks water and macros and doing well. No weight loss  Would like to increase dose    Review of Systems   Constitutional, HEENT, cardiovascular, pulmonary, gi and gu systems are negative, except as otherwise noted.      Objective           Vitals:  No vitals were obtained today due to virtual visit.    Physical Exam   GENERAL: Healthy, alert and no distress  EYES: Eyes grossly normal to inspection.  No discharge or erythema, or obvious scleral/conjunctival abnormalities.  RESP: No audible wheeze, cough, or visible cyanosis.  No visible retractions or increased work of breathing.    SKIN: Visible skin clear. No significant rash, abnormal pigmentation or lesions.  NEURO: Cranial nerves grossly intact.  Mentation and speech appropriate for age.  PSYCH: Mentation appears normal, affect normal/bright, judgement and insight intact, normal speech and appearance well-groomed.                Video-Visit Details    Type of service:  Video Visit     Originating Location (pt. Location): Home    Distant Location (provider location):  On-site  Platform used for Video Visit: Wysiwyg

## 2023-01-30 DIAGNOSIS — G47.36 HYPOXEMIA ASSOCIATED WITH SLEEP: Primary | ICD-10-CM

## 2023-02-02 NOTE — TELEPHONE ENCOUNTER
RECORDS RECEIVED FROM: internal    DATE RECEIVED: 3.24.23   NOTES STATUS DETAILS   OFFICE NOTE from referring provider internal  George Wallace, PAULA CNP   OFFICE NOTE from other specialist internal  10.23.22 [facility]  3.16.22 [place]    DISCHARGE SUMMARY from hospital     DISCHARGE REPORT from the ER     MEDICATION LIST internal     IMAGING  (NEED IMAGES AND REPORTS)     CT SCAN In process     CHEST XRAY (CXR) In process     TESTS     PULMONARY FUNCTION TESTING (PFT) internal  12.30.22        Action 2.2.23 sv    Action Taken Message sent to nurse pool to place CXR order

## 2023-02-03 ENCOUNTER — TELEPHONE (OUTPATIENT)
Dept: PULMONOLOGY | Facility: CLINIC | Age: 28
End: 2023-02-03
Payer: OTHER GOVERNMENT

## 2023-02-03 DIAGNOSIS — G47.34 NOCTURNAL HYPOXIA: Primary | ICD-10-CM

## 2023-02-03 NOTE — TELEPHONE ENCOUNTER
LVM for the patient to call back and schedule the following:    Appointment type: CXR  Provider: GERRY  Return date: 3/24/23  Specialty phone number:[phone]  Additional appointment(s) needed: n/a  Additonal Notes: n/a

## 2023-02-09 ENCOUNTER — MYC REFILL (OUTPATIENT)
Dept: FAMILY MEDICINE | Facility: CLINIC | Age: 28
End: 2023-02-09
Payer: OTHER GOVERNMENT

## 2023-02-09 DIAGNOSIS — F90.2 ATTENTION DEFICIT HYPERACTIVITY DISORDER (ADHD), COMBINED TYPE: ICD-10-CM

## 2023-02-09 RX ORDER — DEXTROAMPHETAMINE SACCHARATE, AMPHETAMINE ASPARTATE MONOHYDRATE, DEXTROAMPHETAMINE SULFATE AND AMPHETAMINE SULFATE 5; 5; 5; 5 MG/1; MG/1; MG/1; MG/1
20 CAPSULE, EXTENDED RELEASE ORAL DAILY
Qty: 30 CAPSULE | Refills: 0 | Status: SHIPPED | OUTPATIENT
Start: 2023-02-20 | End: 2023-03-22

## 2023-02-14 ENCOUNTER — TELEPHONE (OUTPATIENT)
Dept: PULMONOLOGY | Facility: CLINIC | Age: 28
End: 2023-02-14
Payer: OTHER GOVERNMENT

## 2023-02-14 NOTE — TELEPHONE ENCOUNTER
Patient Contacted for the patient to call back and schedule the following:    Appointment type: CXR  Provider: Leno  Return date: 3/24/23  Specialty phone number: na  Additional appointment(s) needed: na  Additonal Notes: 2/14/23 - Patient contacted, CXR scheduled for 3:00pm on 3/24/23, prior to visit with Dr. Burr at 3:40pm. [facility]

## 2023-03-24 ENCOUNTER — ANCILLARY PROCEDURE (OUTPATIENT)
Dept: GENERAL RADIOLOGY | Facility: CLINIC | Age: 28
End: 2023-03-24
Payer: OTHER GOVERNMENT

## 2023-03-24 ENCOUNTER — PRE VISIT (OUTPATIENT)
Dept: PULMONOLOGY | Facility: CLINIC | Age: 28
End: 2023-03-24
Payer: OTHER GOVERNMENT

## 2023-03-24 ENCOUNTER — OFFICE VISIT (OUTPATIENT)
Dept: PULMONOLOGY | Facility: CLINIC | Age: 28
End: 2023-03-24
Attending: NURSE PRACTITIONER
Payer: OTHER GOVERNMENT

## 2023-03-24 VITALS — HEART RATE: 61 BPM | SYSTOLIC BLOOD PRESSURE: 114 MMHG | DIASTOLIC BLOOD PRESSURE: 74 MMHG | OXYGEN SATURATION: 98 %

## 2023-03-24 DIAGNOSIS — G47.36 HYPOXEMIA ASSOCIATED WITH SLEEP: ICD-10-CM

## 2023-03-24 DIAGNOSIS — R93.89 ABNORMAL CXR: Primary | ICD-10-CM

## 2023-03-24 DIAGNOSIS — G47.34 NOCTURNAL HYPOXIA: ICD-10-CM

## 2023-03-24 PROCEDURE — 99207 PR NON-BILLABLE SERV PER CHARTING: CPT | Mod: GC | Performed by: STUDENT IN AN ORGANIZED HEALTH CARE EDUCATION/TRAINING PROGRAM

## 2023-03-24 PROCEDURE — G0463 HOSPITAL OUTPT CLINIC VISIT: HCPCS | Performed by: STUDENT IN AN ORGANIZED HEALTH CARE EDUCATION/TRAINING PROGRAM

## 2023-03-24 PROCEDURE — 71046 X-RAY EXAM CHEST 2 VIEWS: CPT | Mod: GC | Performed by: RADIOLOGY

## 2023-03-24 ASSESSMENT — ENCOUNTER SYMPTOMS
POOR WOUND HEALING: 0
DEPRESSION: 0
PANIC: 0
NAIL CHANGES: 0
SKIN CHANGES: 0
DECREASED CONCENTRATION: 0
INSOMNIA: 1
NERVOUS/ANXIOUS: 0

## 2023-03-24 ASSESSMENT — PAIN SCALES - GENERAL: PAINLEVEL: NO PAIN (0)

## 2023-03-24 NOTE — NURSING NOTE
Chief Complaint   Patient presents with     New Patient     Hypoxemia with sleep      Vitals were taken and medications were reconciled.     Jerrica Rojo RMA  2:57 PM

## 2023-03-24 NOTE — PROGRESS NOTES
[facility]   Pulmonary   Clinic Note 3/24/2023  Chago Soni MRN: [id number]    Assessment & Plan      Nocturnal Hypoxia (10/2022 sleep study: low O2 84.0%, Time Spent ?88% 59.4 minutes / Time Spent ?89% 207.0 minutes)  Significant positional desaturation in clinic (desaturation from 100% while sitting upright to 95% while lying flat with rapid improvement to 100% with sitting upright on room air)  Chronic chest tightness and shortness of breath when laying flat with improvement when sitting upright  Normal PFTs  Hx of seasonal allergic rhinitis/conjunctivitis with L maxillary sinus polyps    The differential for this patient's nighttime hypoxia is broad. The positional nature of his hypoxia at nighttime in addition to his symptoms of chest tightness and shortness of breath when lying flat raises the question of possible intra-cardiac shunt physiology (ASD vs VSD vs pulmonary AVM vs other) so the patient certainly warrants TTE with bubble study.  However, the patient denies any exertional shortness of breath or chest tightness which would potentially be expected with significant intracardiac shunt, so this would potentially argue against a significant intracardiac shunt. Sleep study 10/2022 negative for sleep apnea. CXR today notable for no obvious parenchymal lung disease that would explain his nighttime hypoxia however he may have small pulmonary micronodules present so we will further clarify these with noncontrast chest CT (it is possible that some of these micronodules are actually blood vessels that are perpendicular to the beam so they may not be nodules at all).  No concern for hypoventilation syndromes or neuromuscular weakness based on symptoms/history/PFTs.  PFTs negative for restrictive or obstructive lung disease.  DLCO is notably on the upper limit of normal without correction for hemoglobin and given his presumably chronic nighttime hypoxia it is possible that he actually has  "polycythemia so we will obtain CBC at this time.  The patient reports a family history of scleroderma and positional respiratory symptoms and his mother so we will also work-up for autoimmune conditions and/or consider genetic conditions if seemingly pertinent with his upcoming work-up.    -Ordered TTE with bubble to evaluate for intracardiac shunt given his significant nighttime hypoxia and positional respiratory symptoms/chest tightness when lying flat for long period of time  -Noncontrast chest CT to further evaluate possible abnormal chest x-ray  -CBC with differential, TEJ, SCL 70, RF, ANCA  -We will plan to call the patient with imaging and lab results, no need for follow-up in pulmonary clinic in person pending any abnormal results    Patient seen & discussed w/  Dr. Carroll, who agrees with the above assessment and plan.    El Burr MD  Pulmonary and Critical Care Medicine Fellow  3/24/2023          History of Present Illness:   27 yom hx of seasonal allergic rhinitis/conjunctivitis (pollen, animals, dust mites) and ADHD presenting for pulmonary evaluation of \"nighttime hypoxia.\" PFT from 12/30/2022 normal without evidence of airflow obstruction. Sleep study performed 10/23/2022 negative for sleep apnea, but did show evidence of hypoxemia <88% (59.4 minutes and 207 minutes <207) for which sleep medicine recommended pulm evaluation with PFT, TTE w/ bubble, and imaging.    Today the patient reports that the main reason that he got a sleep study in late 2022 was because he was having frequent spells of sleepwalking/ambulating while asleep and moving objects around his house.  The patient reports that he occasionally will feel like he only got 3 or 4 hours of sleep even though he slept for 8-1/2 hours.  He does report a longstanding history of positional chest tightness and shortness of breath when laying flat that it does improve with sitting upright (he notes having similar symptoms since " "childhood).  He denies any exertional shortness of breath, chest pain, lightheadedness, dizziness, leg swelling, wheezing, or other concerning symptoms.  He denies any cough at this time however when he gets a cold or that allergies he will occasionally have a minimally to nonproductive cough for 3 months and the last time this happened was approximately 1 to 2 years ago.  He does report a significant history of allergies for which he manages with Singulair and Flonase during the change of the seasons and he does not need to use as much during the winter.  Outside of seasonal allergies he is allergic to dogs and cats but he is not around them.  He does report a significant history of sinus congestion when his allergies get bad but denies any issues with sinus infections.  He denies hemoptysis, hematuria, epistaxis.    The patient is in the [employer] and does a fair amount of world travel ([place], [place]) and he has also been to [place] and essentially all  states, but he denies any change or onset of symptoms with travel to these various places.  No sick contacts.  He lives with his girlfriend in the home.  He occasionally drinks ETOH, he is a non-smoker, does not vape, does not use drugs, and does not have any inhalational exposures through his profession (no burn pit exposure). He reports a family history of scleroderma/\"lung issues\" in his mother who also has positional respiratory issues when laying flat requiring her to sleep at a incline at nighttime.          Review of Symptoms:   10-point ROS reviewed, & found negative w/ exceptions noted in the HPI.          Past Medical History:     No past medical history on file.    No past surgical history on file.         Allergies:     Allergies   Allergen Reactions     Doxycycline      Eye swelling              Outpatient Medications:     amphetamine-dextroamphetamine (ADDERALL XR) 20 MG 24 hr capsule, Take 1 capsule (20 mg) by mouth daily for 30 days  [START ON " 4/22/2023] amphetamine-dextroamphetamine (ADDERALL XR) 20 MG 24 hr capsule, Take 1 capsule (20 mg) by mouth daily for 30 days  [START ON 5/23/2023] amphetamine-dextroamphetamine (ADDERALL XR) 20 MG 24 hr capsule, Take 1 capsule (20 mg) by mouth daily for 30 days  fluticasone (FLONASE) 50 MCG/ACT nasal spray, Spray 1 spray into both nostrils daily (Patient not taking: Reported on 1/23/2023)  montelukast (SINGULAIR) 10 MG tablet, Take 1 tablet (10 mg) by mouth At Bedtime (Patient not taking: Reported on 12/28/2022)    betamethasone acet & sod phos (CELESTONE) injection 6 mg  ropivacaine (NAROPIN) injection 4 mL              Family History:     Family History   Problem Relation Age of Onset     Allergies Mother      Allergies Father                Social History:     Social History     Tobacco Use     Smoking status: Never     Passive exposure: Never     Smokeless tobacco: Never   Vaping Use     Vaping Use: Never used   Substance Use Topics     Alcohol use: Yes     Comment: 6 drinks per week     Drug use: Never             Physical Exam:   /74 (BP Location: Right arm, Patient Position: Sitting, Cuff Size: Adult Large)   Pulse 61   SpO2 98%    Positional oximetry: satting 100% on room air while sitting upright, significant desaturation to 95% when laying flat, rapid improvement to 100% when sitting back upright.    General: male in no acute distress  HENT: external ears without visible abnormalities, no rhinorrhea, no epistaxis  Lungs: CTAB, no wheezing, no crackles, no accessory muscle use  Heart: RRR, no murmurs  Abdomen: non-distended  Extremities: no pitting edema, no clubbing or cyanosis  Skin: no visible rashes, no mottling  Neurologic: moving all 4 extremities spontaneously, awake and alert          Data:   Pertinent data reviewed and discussed above

## 2023-03-24 NOTE — LETTER
3/24/2023         RE: Chago Soni  [address]  [facility] [address]        Dear Colleague,    Thank you for referring your patient, Chago Soni, to the [facility] FOR LUNG SCIENCE AND [facility] [place]. Please see a copy of my visit note below.    [facility]   Pulmonary   Clinic Note 3/24/2023  Chago Soni MRN: [id number]    Assessment & Plan      Nocturnal Hypoxia (10/2022 sleep study: low O2 84.0%, Time Spent ?88% 59.4 minutes / Time Spent ?89% 207.0 minutes)  Significant positional desaturation in clinic (desaturation from 100% while sitting upright to 95% while lying flat with rapid improvement to 100% with sitting upright on room air)  Chronic chest tightness and shortness of breath when laying flat with improvement when sitting upright  Normal PFTs  Hx of seasonal allergic rhinitis/conjunctivitis with L maxillary sinus polyps    The differential for this patient's nighttime hypoxia is broad. The positional nature of his hypoxia at nighttime in addition to his symptoms of chest tightness and shortness of breath when lying flat raises the question of possible intra-cardiac shunt physiology (ASD vs VSD vs pulmonary AVM vs other) so the patient certainly warrants TTE with bubble study.  However, the patient denies any exertional shortness of breath or chest tightness which would potentially be expected with significant intracardiac shunt, so this would potentially argue against a significant intracardiac shunt. Sleep study 10/2022 negative for sleep apnea. CXR today notable for no obvious parenchymal lung disease that would explain his nighttime hypoxia however he may have small pulmonary micronodules present so we will further clarify these with noncontrast chest CT (it is possible that some of these micronodules are actually blood vessels that are perpendicular to the beam so they may not be nodules at all).  No concern for hypoventilation syndromes or  "neuromuscular weakness based on symptoms/history/PFTs.  PFTs negative for restrictive or obstructive lung disease.  DLCO is notably on the upper limit of normal without correction for hemoglobin and given his presumably chronic nighttime hypoxia it is possible that he actually has polycythemia so we will obtain CBC at this time.  The patient reports a family history of scleroderma and positional respiratory symptoms and his mother so we will also work-up for autoimmune conditions and/or consider genetic conditions if seemingly pertinent with his upcoming work-up.    -Ordered TTE with bubble to evaluate for intracardiac shunt given his significant nighttime hypoxia and positional respiratory symptoms/chest tightness when lying flat for long period of time  -Noncontrast chest CT to further evaluate possible abnormal chest x-ray  -CBC with differential, TEJ, SCL 70, RF, ANCA  -We will plan to call the patient with imaging and lab results, no need for follow-up in pulmonary clinic in person pending any abnormal results    Patient seen & discussed w/  Dr. Carroll, who agrees with the above assessment and plan.    El Burr MD  Pulmonary and Critical Care Medicine Fellow  3/24/2023          History of Present Illness:   27 yom hx of seasonal allergic rhinitis/conjunctivitis (pollen, animals, dust mites) and ADHD presenting for pulmonary evaluation of \"nighttime hypoxia.\" PFT from 12/30/2022 normal without evidence of airflow obstruction. Sleep study performed 10/23/2022 negative for sleep apnea, but did show evidence of hypoxemia <88% (59.4 minutes and 207 minutes <207) for which sleep medicine recommended pulm evaluation with PFT, TTE w/ bubble, and imaging.    Today the patient reports that the main reason that he got a sleep study in late 2022 was because he was having frequent spells of sleepwalking/ambulating while asleep and moving objects around his house.  The patient reports that he occasionally will " "feel like he only got 3 or 4 hours of sleep even though he slept for 8-1/2 hours.  He does report a longstanding history of positional chest tightness and shortness of breath when laying flat that it does improve with sitting upright (he notes having similar symptoms since childhood).  He denies any exertional shortness of breath, chest pain, lightheadedness, dizziness, leg swelling, wheezing, or other concerning symptoms.  He denies any cough at this time however when he gets a cold or that allergies he will occasionally have a minimally to nonproductive cough for 3 months and the last time this happened was approximately 1 to 2 years ago.  He does report a significant history of allergies for which he manages with Singulair and Flonase during the change of the seasons and he does not need to use as much during the winter.  Outside of seasonal allergies he is allergic to dogs and cats but he is not around them.  He does report a significant history of sinus congestion when his allergies get bad but denies any issues with sinus infections.  He denies hemoptysis, hematuria, epistaxis.    The patient is in the [employer] and does a fair amount of world travel ([place], [place]) and he has also been to [place] and essentially all  states, but he denies any change or onset of symptoms with travel to these various places.  No sick contacts.  He lives with his girlfriend in the home.  He occasionally drinks ETOH, he is a non-smoker, does not vape, does not use drugs, and does not have any inhalational exposures through his profession (no burn pit exposure). He reports a family history of scleroderma/\"lung issues\" in his mother who also has positional respiratory issues when laying flat requiring her to sleep at a incline at nighttime.          Review of Symptoms:   10-point ROS reviewed, & found negative w/ exceptions noted in the HPI.          Past Medical History:     No past medical history on file.    No past surgical " history on file.         Allergies:     Allergies   Allergen Reactions    Doxycycline      Eye swelling              Outpatient Medications:     amphetamine-dextroamphetamine (ADDERALL XR) 20 MG 24 hr capsule, Take 1 capsule (20 mg) by mouth daily for 30 days  [START ON 4/22/2023] amphetamine-dextroamphetamine (ADDERALL XR) 20 MG 24 hr capsule, Take 1 capsule (20 mg) by mouth daily for 30 days  [START ON 5/23/2023] amphetamine-dextroamphetamine (ADDERALL XR) 20 MG 24 hr capsule, Take 1 capsule (20 mg) by mouth daily for 30 days  fluticasone (FLONASE) 50 MCG/ACT nasal spray, Spray 1 spray into both nostrils daily (Patient not taking: Reported on 1/23/2023)  montelukast (SINGULAIR) 10 MG tablet, Take 1 tablet (10 mg) by mouth At Bedtime (Patient not taking: Reported on 12/28/2022)    betamethasone acet & sod phos (CELESTONE) injection 6 mg  ropivacaine (NAROPIN) injection 4 mL              Family History:     Family History   Problem Relation Age of Onset    Allergies Mother     Allergies Father                Social History:     Social History     Tobacco Use    Smoking status: Never     Passive exposure: Never    Smokeless tobacco: Never   Vaping Use    Vaping Use: Never used   Substance Use Topics    Alcohol use: Yes     Comment: 6 drinks per week    Drug use: Never             Physical Exam:   /74 (BP Location: Right arm, Patient Position: Sitting, Cuff Size: Adult Large)   Pulse 61   SpO2 98%    Positional oximetry: satting 100% on room air while sitting upright, significant desaturation to 95% when laying flat, rapid improvement to 100% when sitting back upright.    General: male in no acute distress  HENT: external ears without visible abnormalities, no rhinorrhea, no epistaxis  Lungs: CTAB, no wheezing, no crackles, no accessory muscle use  Heart: RRR, no murmurs  Abdomen: non-distended  Extremities: no pitting edema, no clubbing or cyanosis  Skin: no visible rashes, no mottling  Neurologic: moving all  4 extremities spontaneously, awake and alert          Data:   Pertinent data reviewed and discussed above        Again, thank you for allowing me to participate in the care of your patient.        Sincerely,        El Burr MD

## 2023-04-23 ENCOUNTER — HEALTH MAINTENANCE LETTER (OUTPATIENT)
Age: 28
End: 2023-04-23

## 2023-06-07 ENCOUNTER — VIRTUAL VISIT (OUTPATIENT)
Dept: FAMILY MEDICINE | Facility: CLINIC | Age: 28
End: 2023-06-07
Payer: OTHER GOVERNMENT

## 2023-06-07 DIAGNOSIS — F90.2 ATTENTION DEFICIT HYPERACTIVITY DISORDER (ADHD), COMBINED TYPE: Primary | ICD-10-CM

## 2023-06-07 DIAGNOSIS — B00.9 HSV (HERPES SIMPLEX VIRUS) INFECTION: ICD-10-CM

## 2023-06-07 DIAGNOSIS — J30.2 SEASONAL ALLERGIES: ICD-10-CM

## 2023-06-07 PROCEDURE — 99214 OFFICE O/P EST MOD 30 MIN: CPT | Mod: VID | Performed by: NURSE PRACTITIONER

## 2023-06-07 RX ORDER — MONTELUKAST SODIUM 10 MG/1
10 TABLET ORAL AT BEDTIME
Qty: 90 TABLET | Refills: 3 | Status: SHIPPED | OUTPATIENT
Start: 2023-06-07 | End: 2024-04-23

## 2023-06-07 RX ORDER — FLUTICASONE PROPIONATE 50 MCG
1 SPRAY, SUSPENSION (ML) NASAL DAILY
Qty: 16 G | Refills: 4 | Status: SHIPPED | OUTPATIENT
Start: 2023-06-07 | End: 2024-04-23

## 2023-06-07 RX ORDER — DEXTROAMPHETAMINE SACCHARATE, AMPHETAMINE ASPARTATE MONOHYDRATE, DEXTROAMPHETAMINE SULFATE AND AMPHETAMINE SULFATE 5; 5; 5; 5 MG/1; MG/1; MG/1; MG/1
20 CAPSULE, EXTENDED RELEASE ORAL DAILY
Qty: 30 CAPSULE | Refills: 0 | Status: SHIPPED | OUTPATIENT
Start: 2023-06-29 | End: 2023-07-29

## 2023-06-07 RX ORDER — VALACYCLOVIR HYDROCHLORIDE 500 MG/1
TABLET, FILM COATED ORAL
COMMUNITY
Start: 2023-03-14 | End: 2024-04-23

## 2023-06-07 RX ORDER — DEXTROAMPHETAMINE SACCHARATE, AMPHETAMINE ASPARTATE MONOHYDRATE, DEXTROAMPHETAMINE SULFATE AND AMPHETAMINE SULFATE 5; 5; 5; 5 MG/1; MG/1; MG/1; MG/1
20 CAPSULE, EXTENDED RELEASE ORAL DAILY
Qty: 30 CAPSULE | Refills: 0 | Status: SHIPPED | OUTPATIENT
Start: 2023-08-28 | End: 2023-09-27

## 2023-06-07 RX ORDER — DEXTROAMPHETAMINE SACCHARATE, AMPHETAMINE ASPARTATE MONOHYDRATE, DEXTROAMPHETAMINE SULFATE AND AMPHETAMINE SULFATE 5; 5; 5; 5 MG/1; MG/1; MG/1; MG/1
20 CAPSULE, EXTENDED RELEASE ORAL DAILY
Qty: 30 CAPSULE | Refills: 0 | Status: SHIPPED | OUTPATIENT
Start: 2023-07-29 | End: 2023-08-28

## 2023-06-07 RX ORDER — VALACYCLOVIR HYDROCHLORIDE 1 G/1
2000 TABLET, FILM COATED ORAL 2 TIMES DAILY
Qty: 4 TABLET | Refills: 3 | Status: SHIPPED | OUTPATIENT
Start: 2023-06-07 | End: 2023-10-20

## 2023-06-07 NOTE — PROGRESS NOTES
"Ross is a 27 year old who is being evaluated via a billable video visit.      How would you like to obtain your AVS? MyChart  If the video visit is dropped, the invitation should be resent by: Text to cell phone: [phone]  Will anyone else be joining your video visit? No      Assessment & Plan     Attention deficit hyperactivity disorder (ADHD), combined type  Symptoms well controlled with current dose. Refilled. Ok to call for refill in 3 months. Next visit 6 months.  - amphetamine-dextroamphetamine (ADDERALL XR) 20 MG 24 hr capsule; Take 1 capsule (20 mg) by mouth daily for 30 days  - amphetamine-dextroamphetamine (ADDERALL XR) 20 MG 24 hr capsule; Take 1 capsule (20 mg) by mouth daily for 30 days  - amphetamine-dextroamphetamine (ADDERALL XR) 20 MG 24 hr capsule; Take 1 capsule (20 mg) by mouth daily for 30 days    HSV (herpes simplex virus) infection  To nose. Using suppressive therapy but having more outbreaks. Will treat current outbreak that started last night with the below. Follow up with dermatology.   - Adult Dermatology Referral; Future  - valACYclovir (VALTREX) 1000 mg tablet; Take 2 tablets (2,000 mg) by mouth 2 times daily for 1 day Use for outbreak    Seasonal allergies  Refilled.   - fluticasone (FLONASE) 50 MCG/ACT nasal spray; Spray 1 spray into both nostrils daily  - montelukast (SINGULAIR) 10 MG tablet; Take 1 tablet (10 mg) by mouth At Bedtime       BMI:   Estimated body mass index is 26.5 kg/m  as calculated from the following:    Height as of 11/7/22: 1.803 m (5' 11\").    Weight as of 11/7/22: 86.2 kg (190 lb).       The benefits, risks and potential side effects were discussed in detail. Black box warnings discussed as relevant. All patient questions were answered. The patient was instructed to follow up immediately if any adverse reactions develop.    Return precautions discussed, including when to seek urgent/emergent care.    Patient verbalizes understanding and agrees with plan of " care.       PAULA SZYMANSKI CNP  [facility] MEHRAN Hawkins is a 27 year old, presenting for the following health issues:  Derm Problem    History of Present Illness       Reason for visit:  Ongoing nose skin condition, medication review/renewal    He eats 2-3 servings of fruits and vegetables daily.He consumes 0 sweetened beverage(s) daily.He exercises with enough effort to increase his heart rate 60 or more minutes per day.  He exercises with enough effort to increase his heart rate 6 days per week. He is missing 3 dose(s) of medications per week.  He is not taking prescribed medications regularly due to other.    Medication Followup of Adderall    Taking Medication as prescribed: yes, PRN    Side Effects:  None    Medication Helping Symptoms:  Yes    Of Note: Would like refills on Flonase and Singulair as well. Would like new referral to new dermatologist.    Going well with Adderall at current dose. Improved ability to focus, accomplish tasks and improved memory. Takes approx 4 times per week        Nose lesion - has seen 3 different dermatologists. Most recent dermatology [facility]. Was doing treatment for cold sore on noses a few times and then started on suppressive therapy - Valtrex 500 mg daily. Has had cold sores the last 2 months since just being on suppressive therapy. Would like referral to new dermatologist. Has been avoiding sun, spice and trying to decrease stress. Most recent lesion started last night to left nostril.     He is requesting a refill of singulair and fluticasone (Flonase) that he uses for allergies. Symptoms well controlled when he takes them.    Review of Systems   Constitutional, HEENT, cardiovascular, pulmonary, GI, , musculoskeletal, neuro, skin, endocrine and psych systems are negative, except as otherwise noted.      Objective         Vitals:  No vitals were obtained today due to virtual visit.    Physical Exam   GENERAL:  Healthy, alert and no distress  EYES: Eyes grossly normal to inspection.  No discharge or erythema, or obvious scleral/conjunctival abnormalities.  RESP: No audible wheeze, cough, or visible cyanosis.  No visible retractions or increased work of breathing.    SKIN: 2 red lesions to nose  NEURO: Cranial nerves grossly intact.  Mentation and speech appropriate for age.  PSYCH: Mentation appears normal, affect normal/bright, judgement and insight intact, normal speech and appearance well-groomed.                Video-Visit Details    Type of service:  Video Visit   10:46-10:58 am  Originating Location (pt. Location): Home    Distant Location (provider location):  On-site  Platform used for Video Visit: [employer]

## 2023-06-07 NOTE — PATIENT INSTRUCTIONS
At [facility], we strive to deliver an exceptional experience to you, every time we see you. If you receive a survey, please complete it as we do value your feedback.  If you have MyChart, you can expect to receive results automatically within 24 hours of their completion.  Your provider will send a note interpreting your results as well.   If you do not have MyChart, you should receive your results in about a week by mail.    Your care team:                            Family Medicine Internal Medicine   MD Germán More MD Shantel Branch-Fleming, MD Srinivasa Vaka, MD Katya Belousova, PAPAULA Fritz CNP, MD ([facility]   Júnior Smith, MD Harleen Mabry MD Amelia Massimini APRN ADELINE Espinoza APRN MD Jony Jones MD          Clinic hours: Monday - Thursday 7 am-6 pm; Fridays 7 am-5 pm.   Urgent care: Monday - Friday 10 am- 8 pm; Saturday and Sunday 9 am-5 pm.    Clinic: [phone]       [facility]: Monday - Thursday 8 am - 7 pm; Friday 8 am - 6 pm  [facility]: [phone]

## 2023-06-21 ENCOUNTER — OFFICE VISIT (OUTPATIENT)
Dept: DERMATOLOGY | Facility: CLINIC | Age: 28
End: 2023-06-21
Attending: NURSE PRACTITIONER
Payer: OTHER GOVERNMENT

## 2023-06-21 DIAGNOSIS — L71.9 ACNE ROSACEA: Primary | ICD-10-CM

## 2023-06-21 DIAGNOSIS — B00.9 HSV (HERPES SIMPLEX VIRUS) INFECTION: ICD-10-CM

## 2023-06-21 PROCEDURE — 99214 OFFICE O/P EST MOD 30 MIN: CPT | Performed by: DERMATOLOGY

## 2023-06-21 RX ORDER — DOXYCYCLINE 100 MG/1
100 CAPSULE ORAL 2 TIMES DAILY
Qty: 60 CAPSULE | Refills: 2 | Status: SHIPPED | OUTPATIENT
Start: 2023-06-21 | End: 2023-10-20

## 2023-06-21 RX ORDER — AZELAIC ACID 0.15 G/G
GEL TOPICAL
Qty: 45 G | Refills: 11 | Status: SHIPPED | OUTPATIENT
Start: 2023-06-21 | End: 2024-04-23

## 2023-06-21 ASSESSMENT — PAIN SCALES - GENERAL: PAINLEVEL: NO PAIN (0)

## 2023-06-21 NOTE — PROGRESS NOTES
[facility] Dermatology Note  Encounter Date: Jun 21, 2023  Office Visit     Dermatology Problem List:  1. Acneiform eruption on the nose - favor rosacea.    - Start doxycycline 100 mg PO BID   - Continue metronidazole 0.75% gel topically to nose   - Start tacrolimus 0.1% ointment once daily to nose and eyelids   - Start using vaseline prn for eye and mouth lesions; discontinue using aquaphor  2.  Eyelid dermatitis. Ddx irritant/contact +/- rosacea/periorificial dermatitis  - doxycycline as above   - Start tacrolimus 0.1% ointment once daily to nose and eyelids   - Start using vaseline prn for eye and mouth lesions; discontinue using aquaphor  ____________________________________________    Assessment & Plan:     # Acneiform eruption, bilateral nose. Chronic, flare.   Given distribution and associated flushing, suspect ET and papulopustular rosacea +/- secondary impetiginization. Recommended trial of oral and topical regimen as below. Consider switch to doxycycline at submicrobial dosing at follow-up if improved. Reiterated that morphology and history not entirely consistent with recurrent HSV (especially given lack of evidence of HSV infection or response to valtrex). OK to continue suppressive dosing for now. Did ask patient to return PRN when active to consider swab for HSV PCR  - Start doxycycline 100 mg PO BID; side effects of GI upset and photosensitivity discussed  - Start azelaic acid 15% gel qAM  - Consider transition to doxycycline 40 mg PO ER or 20 mg PO BID at sub-microbial dosing at follow-up  - OK to continue valtrex 500 mg PO qdaily for now    Procedures Performed:   None    Follow-up: 2 month(s) virtually (telephone with photos), or earlier for new or changing lesions    Staff:     Dieter Hinds MD  Pronouns: he/him/his    Department of Dermatology  [facility]: Phone: [phone],  Fax:[phone]  [facility]: Phone: [phone] Fax: [phone]  ____________________________________________    CC: Derm Problem (Pt reports a recurring nose issue/infections.)    HPI:  Mr. Chago Soni is a(n) 27 year old male who presents today as a return patient for rash on the nose.     He reports a many year history of intermittent rash on the nose. He describes this as tender, pus-filled bumps on the nose which crust over and then resolve slowly. Has been recently treated by another dermatologist at [facility] who felt this could be a recurrent HSV infection and started on valtrex 500 mg PO qdaily prophylaxis. He states this has not been helpful and not led to improvement. He reports has not had a PCR test for HSV.     Patient is otherwise feeling well, without additional skin concerns.     Labs Reviewed:  N/A    Physical Exam:  Vitals: There were no vitals taken for this visit.  SKIN: Focused examination of nose was performed.  - Clear on exam today. Did review photos significant for papules, pustules, and crusted papules/plaques on the nose with surrounding rim of erythema.   - No other lesions of concern on areas examined.     Medications:  Current Outpatient Medications   Medication     amphetamine-dextroamphetamine (ADDERALL XR) 20 MG 24 hr capsule     azelaic acid (FINACIA) 15 % external gel     doxycycline monohydrate (MONODOX) 100 MG capsule     fluticasone (FLONASE) 50 MCG/ACT nasal spray     montelukast (SINGULAIR) 10 MG tablet     valACYclovir (VALTREX) 500 MG tablet     [START ON 6/29/2023] amphetamine-dextroamphetamine (ADDERALL XR) 20 MG 24 hr capsule     [START ON 7/29/2023] amphetamine-dextroamphetamine (ADDERALL XR) 20 MG 24 hr capsule     [START ON 8/28/2023] amphetamine-dextroamphetamine (ADDERALL XR) 20 MG 24 hr capsule     valACYclovir (VALTREX) 1000 mg tablet     Current Facility-Administered Medications   Medication      betamethasone acet & sod phos (CELESTONE) injection 6 mg     ropivacaine (NAROPIN) injection 4 mL      Past Medical History:   Patient Active Problem List   Diagnosis     Chronic right shoulder pain     Seasonal allergic rhinitis due to pollen     Allergic rhinitis due to animals     Allergic rhinitis due to dust mite     Allergic conjunctivitis, bilateral     Herpes simplex virus infection     Sleep walking     Attention deficit hyperactivity disorder (ADHD), combined type     No past medical history on file.    CC PAULA Kramer CNP  [address] [place] [address] on close of this encounter.

## 2023-06-21 NOTE — NURSING NOTE
Chief Complaint   Patient presents with     Derm Problem     Pt reports a recurring nose issue/infections.     Kyree Hauser, EMT

## 2023-06-21 NOTE — LETTER
6/21/2023       RE: Chago Soni  [address]  [facility] [address]     Dear Colleague,    Thank you for referring your patient, Chago Soni, to the [facility] [place] [facility]. Please see a copy of my visit note below.    [facility] Dermatology Note  Encounter Date: Jun 21, 2023  Office Visit     Dermatology Problem List:  1. Acneiform eruption on the nose - favor rosacea.    - Start doxycycline 100 mg PO BID   - Continue metronidazole 0.75% gel topically to nose   - Start tacrolimus 0.1% ointment once daily to nose and eyelids   - Start using vaseline prn for eye and mouth lesions; discontinue using aquaphor  2.  Eyelid dermatitis. Ddx irritant/contact +/- rosacea/periorificial dermatitis  - doxycycline as above   - Start tacrolimus 0.1% ointment once daily to nose and eyelids   - Start using vaseline prn for eye and mouth lesions; discontinue using aquaphor  ____________________________________________    Assessment & Plan:     # Acneiform eruption, bilateral nose. Chronic, flare.   Given distribution and associated flushing, suspect ET and papulopustular rosacea +/- secondary impetiginization. Recommended trial of oral and topical regimen as below. Consider switch to doxycycline at submicrobial dosing at follow-up if improved. Reiterated that morphology and history not entirely consistent with recurrent HSV (especially given lack of evidence of HSV infection or response to valtrex). OK to continue suppressive dosing for now. Did ask patient to return PRN when active to consider swab for HSV PCR  - Start doxycycline 100 mg PO BID; side effects of GI upset and photosensitivity discussed  - Start azelaic acid 15% gel qAM  - Consider transition to doxycycline 40 mg PO ER or 20 mg PO BID at sub-microbial dosing at follow-up  - OK to continue valtrex 500 mg PO qdaily for now    Procedures Performed:    None    Follow-up: 2 month(s) virtually (telephone with photos), or earlier for new or changing lesions    Staff:     Dieter Hinds MD  Pronouns: he/him/his    Department of Dermatology  [facility]: Phone: [phone], Fax:[phone]  [facility]: Phone: [phone] Fax: [phone]  ____________________________________________    CC: Derm Problem (Pt reports a recurring nose issue/infections.)    HPI:  Mr. Chago Soni is a(n) 27 year old male who presents today as a return patient for rash on the nose.     He reports a many year history of intermittent rash on the nose. He describes this as tender, pus-filled bumps on the nose which crust over and then resolve slowly. Has been recently treated by another dermatologist at [facility] who felt this could be a recurrent HSV infection and started on valtrex 500 mg PO qdaily prophylaxis. He states this has not been helpful and not led to improvement. He reports has not had a PCR test for HSV.     Patient is otherwise feeling well, without additional skin concerns.     Labs Reviewed:  N/A    Physical Exam:  Vitals: There were no vitals taken for this visit.  SKIN: Focused examination of nose was performed.  - Clear on exam today. Did review photos significant for papules, pustules, and crusted papules/plaques on the nose with surrounding rim of erythema.   - No other lesions of concern on areas examined.     Medications:  Current Outpatient Medications   Medication    amphetamine-dextroamphetamine (ADDERALL XR) 20 MG 24 hr capsule    azelaic acid (FINACIA) 15 % external gel    doxycycline monohydrate (MONODOX) 100 MG capsule    fluticasone (FLONASE) 50 MCG/ACT nasal spray    montelukast (SINGULAIR) 10 MG tablet    valACYclovir (VALTREX) 500 MG tablet    [START ON 6/29/2023] amphetamine-dextroamphetamine (ADDERALL XR) 20 MG 24 hr  capsule    [START ON 7/29/2023] amphetamine-dextroamphetamine (ADDERALL XR) 20 MG 24 hr capsule    [START ON 8/28/2023] amphetamine-dextroamphetamine (ADDERALL XR) 20 MG 24 hr capsule    valACYclovir (VALTREX) 1000 mg tablet     Current Facility-Administered Medications   Medication    betamethasone acet & sod phos (CELESTONE) injection 6 mg    ropivacaine (NAROPIN) injection 4 mL      Past Medical History:   Patient Active Problem List   Diagnosis    Chronic right shoulder pain    Seasonal allergic rhinitis due to pollen    Allergic rhinitis due to animals    Allergic rhinitis due to dust mite    Allergic conjunctivitis, bilateral    Herpes simplex virus infection    Sleep walking    Attention deficit hyperactivity disorder (ADHD), combined type     No past medical history on file.    CC PAULA Kramer CNP  [address] [place] [address] on close of this encounter.

## 2023-08-28 ENCOUNTER — TELEPHONE (OUTPATIENT)
Dept: DERMATOLOGY | Facility: CLINIC | Age: 28
End: 2023-08-28
Payer: OTHER GOVERNMENT

## 2023-08-28 NOTE — TELEPHONE ENCOUNTER
Called pt to remind him to send photos in for upcoming derm visit. Pt stated [place] due [place]  and will call the clinic to reschedule.

## 2023-09-05 ENCOUNTER — MYC MEDICAL ADVICE (OUTPATIENT)
Dept: FAMILY MEDICINE | Facility: CLINIC | Age: 28
End: 2023-09-05
Payer: OTHER GOVERNMENT

## 2023-09-05 DIAGNOSIS — F90.2 ATTENTION DEFICIT HYPERACTIVITY DISORDER (ADHD), COMBINED TYPE: ICD-10-CM

## 2023-09-05 RX ORDER — DEXTROAMPHETAMINE SACCHARATE, AMPHETAMINE ASPARTATE MONOHYDRATE, DEXTROAMPHETAMINE SULFATE AND AMPHETAMINE SULFATE 5; 5; 5; 5 MG/1; MG/1; MG/1; MG/1
20 CAPSULE, EXTENDED RELEASE ORAL DAILY
Qty: 30 CAPSULE | Refills: 0 | OUTPATIENT
Start: 2023-09-05

## 2023-09-12 RX ORDER — DEXTROAMPHETAMINE SACCHARATE, AMPHETAMINE ASPARTATE MONOHYDRATE, DEXTROAMPHETAMINE SULFATE AND AMPHETAMINE SULFATE 5; 5; 5; 5 MG/1; MG/1; MG/1; MG/1
20 CAPSULE, EXTENDED RELEASE ORAL DAILY
Qty: 30 CAPSULE | Refills: 0 | OUTPATIENT
Start: 2023-09-12

## 2023-09-12 NOTE — TELEPHONE ENCOUNTER
Unable to prescribe out of state.  Patient needs to establish with new provider their.  Samantha Isabel MD

## 2023-09-21 ENCOUNTER — VIRTUAL VISIT (OUTPATIENT)
Dept: FAMILY MEDICINE | Facility: CLINIC | Age: 28
End: 2023-09-21
Payer: OTHER GOVERNMENT

## 2023-09-21 DIAGNOSIS — Z63.0 RELATIONSHIP PROBLEM BETWEEN PARTNERS: Primary | ICD-10-CM

## 2023-09-21 PROCEDURE — 99213 OFFICE O/P EST LOW 20 MIN: CPT | Mod: 95 | Performed by: INTERNAL MEDICINE

## 2023-09-21 SDOH — SOCIAL STABILITY - SOCIAL INSECURITY: PROBLEMS IN RELATIONSHIP WITH SPOUSE OR PARTNER: Z63.0

## 2023-09-21 NOTE — PROGRESS NOTES
Ross is a 28 year old who is being evaluated via a billable video visit.      How would you like to obtain your AVS? "[employer]har[employer]  If the video visit is dropped, the invitation should be resent by: Text to cell phone: [phone]  Will anyone else be joining your video visit? No          Assessment & Plan     Chago was seen today for referal request .    Diagnoses and all orders for this visit:    Relationship problem between partners    Other orders  -     REVIEW OF HEALTH MAINTENANCE PROTOCOL ORDERS       Discussed with patient that he needs to establish primary care. I am not his primary care provider. I'm willing to make a one time referral for him. I asked patient to first find out if those 3 clinics accept his insurance. For the ones that accept his insurance, check out the therapist they want to see. Get their fax number and send it to me via [employer], I can put in a referral for him.     I recommend patient contact [facility] or [facility] to schedule for preventive physical and get a primary care physician for future needs.     No follow up scheduled.     Justin Strong MD PhD  [facility]   Ross is a 28 year old, presenting for the following health issues:  Referal Request  (Needing a referral for therapy for insurance )        9/21/2023     1:26 PM   Additional Questions   Roomed by Harpreet XIONG         Patient would like to get referral for couple's therapy. 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 They would like to do couples therapy, premarital therapy, to work through potential conflicts of different life goals in the future. He has . His insurance requires him to get a referral from his primary care provider.     He doesn't have a primary care provider currently. When he has medical needs, he gets whoever is available. He considers [facility] or [facility] to be his home clinic if he has to be seen in person.     History of Present  Illness       Reason for visit:  Therapy Referal    He eats 2-3 servings of fruits and vegetables daily.He consumes 0 sweetened beverage(s) daily.He exercises with enough effort to increase his heart rate 60 or more minutes per day.  He exercises with enough effort to increase his heart rate 7 days per week.   He is taking medications regularly.     ptie      Review of Systems   Constitutional, HEENT, cardiovascular, pulmonary, gi and gu systems are negative, except as otherwise noted.      Objective           Vitals:  No vitals were obtained today due to virtual visit.    Physical Exam   GENERAL: Healthy, alert and no distress  EYES: Eyes grossly normal to inspection.  No discharge or erythema, or obvious scleral/conjunctival abnormalities.  RESP: No audible wheeze, cough, or visible cyanosis.  No visible retractions or increased work of breathing.    SKIN: Visible skin clear. No significant rash, abnormal pigmentation or lesions.  NEURO: Cranial nerves grossly intact.  Mentation and speech appropriate for age.  PSYCH: Mentation appears normal, affect normal/bright, judgement and insight intact, normal speech and appearance well-groomed.        Video-Visit Details    Type of service:  Video Visit     Originating Location (pt. Location): Home    Platform used for Video Visit: Voxel ([employer])

## 2023-10-16 DIAGNOSIS — F90.2 ATTENTION DEFICIT HYPERACTIVITY DISORDER (ADHD), COMBINED TYPE: ICD-10-CM

## 2023-10-16 NOTE — TELEPHONE ENCOUNTER
Pharmacy requesting  med last filled by provider no longer at [place]:    amphetamine-dextroamphetamine (ADDERALL XR) 20 MG 24 hr capsule 30 capsule 0 2023

## 2023-10-19 NOTE — TELEPHONE ENCOUNTER
Patient has not established care with another provider. Did explain to the patient that he needs to make an appointment. Routing to Dr Justin Strong, patient had a virtual appointment with her on 9/21/2023, to see if she will refill this medication.  Edith Dugan [facility]   Primary Care

## 2023-10-20 ENCOUNTER — VIRTUAL VISIT (OUTPATIENT)
Dept: FAMILY MEDICINE | Facility: CLINIC | Age: 28
End: 2023-10-20
Payer: OTHER GOVERNMENT

## 2023-10-20 DIAGNOSIS — F90.2 ATTENTION DEFICIT HYPERACTIVITY DISORDER (ADHD), COMBINED TYPE: Primary | ICD-10-CM

## 2023-10-20 PROCEDURE — 99213 OFFICE O/P EST LOW 20 MIN: CPT | Mod: VID | Performed by: NURSE PRACTITIONER

## 2023-10-20 RX ORDER — DEXTROAMPHETAMINE SACCHARATE, AMPHETAMINE ASPARTATE MONOHYDRATE, DEXTROAMPHETAMINE SULFATE AND AMPHETAMINE SULFATE 5; 5; 5; 5 MG/1; MG/1; MG/1; MG/1
20 CAPSULE, EXTENDED RELEASE ORAL DAILY
Qty: 30 CAPSULE | Refills: 0 | OUTPATIENT
Start: 2023-10-20

## 2023-10-20 RX ORDER — DEXTROAMPHETAMINE SACCHARATE, AMPHETAMINE ASPARTATE MONOHYDRATE, DEXTROAMPHETAMINE SULFATE AND AMPHETAMINE SULFATE 5; 5; 5; 5 MG/1; MG/1; MG/1; MG/1
20 CAPSULE, EXTENDED RELEASE ORAL DAILY
Qty: 30 CAPSULE | Refills: 0 | Status: SHIPPED | OUTPATIENT
Start: 2023-10-20 | End: 2023-11-19

## 2023-10-20 RX ORDER — DEXTROAMPHETAMINE SACCHARATE, AMPHETAMINE ASPARTATE MONOHYDRATE, DEXTROAMPHETAMINE SULFATE AND AMPHETAMINE SULFATE 5; 5; 5; 5 MG/1; MG/1; MG/1; MG/1
20 CAPSULE, EXTENDED RELEASE ORAL DAILY
Qty: 30 CAPSULE | Refills: 0 | Status: SHIPPED | OUTPATIENT
Start: 2023-11-20 | End: 2023-12-20

## 2023-10-20 RX ORDER — DEXTROAMPHETAMINE SACCHARATE, AMPHETAMINE ASPARTATE MONOHYDRATE, DEXTROAMPHETAMINE SULFATE AND AMPHETAMINE SULFATE 5; 5; 5; 5 MG/1; MG/1; MG/1; MG/1
20 CAPSULE, EXTENDED RELEASE ORAL DAILY
Qty: 30 CAPSULE | Refills: 0 | Status: SHIPPED | OUTPATIENT
Start: 2023-12-21 | End: 2024-02-02

## 2023-10-20 NOTE — PROGRESS NOTES
Answers submitted by the patient for this visit:  General Questionnaire (Submitted on 10/20/2023)  Chief Complaint: Chronic problems general questions HPI Form  What is the reason for your visit today? : Adderall refill  How many servings of fruits and vegetables do you eat daily?: 2-3  On average, how many sweetened beverages do you drink each day (Examples: soda, juice, sweet tea, etc.  Do NOT count diet or artificially sweetened beverages)?: 0  How many minutes a day do you exercise enough to make your heart beat faster?: 60 or more  How many days a week do you exercise enough to make your heart beat faster?: 7  How many days per week do you miss taking your medication?: 0  Ross is a 28 year old who is being evaluated via a billable video visit.      How would you like to obtain your AVS? MyChart  If the video visit is dropped, the invitation should be resent by: Text to cell phone: [phone]  Will anyone else be joining your video visit? No          Assessment & Plan     Attention deficit hyperactivity disorder (ADHD), combined type    - amphetamine-dextroamphetamine (ADDERALL XR) 20 MG 24 hr capsule  Dispense: 30 capsule; Refill: 0  - amphetamine-dextroamphetamine (ADDERALL XR) 20 MG 24 hr capsule  Dispense: 30 capsule; Refill: 0  - amphetamine-dextroamphetamine (ADDERALL XR) 20 MG 24 hr capsule  Dispense: 30 capsule; Refill: 0      -I reviewed prior chart notes from prior PCP.  It appears that he is doing well on Adderall for ADHD.  PDMP reviewed.  Behavioral health forms were sent to the patient to get completed.  No side effects indicated at our visit today.  Medication can get refilled, but, I told him to make an office visit appointment so he can obtain a regular PCP, and complete controlled substance agreement form.  I discussed that he should get this completed within the next 3 months.      BMI:   Estimated body mass index is 26.5 kg/m  as calculated from the following:    Height as of 11/7/22: 1.803  "darren (5' 11\").    Weight as of 11/7/22: 86.2 kg (190 lb).           PAULA Buitrago CNP [facility]    Derian Hawkins is a 28 year old, presenting for the following health issues:  Recheck Medication    -He stated that he has been on Adderall for many years for ADHD.  He finds that this medication works well for him, and denied any medication side effects.  He is an active .  He denied any drug or alcohol abuse.  He stated that he has been sleeping well on medication.   -He takes seasonal allergy medicines if needed.         9/21/2023     1:26 PM   Additional Questions   Roomed by Harpreet XIONG       History of Present Illness       Reason for visit:  Adderall refill    He eats 2-3 servings of fruits and vegetables daily.He consumes 0 sweetened beverage(s) daily.He exercises with enough effort to increase his heart rate 60 or more minutes per day.  He exercises with enough effort to increase his heart rate 7 days per week.   He is taking medications regularly.               Review of Systems         Objective           Vitals:  No vitals were obtained today due to virtual visit.    Physical Exam   GENERAL: Healthy, alert and no distress  EYES: Eyes grossly normal to inspection.  No discharge or erythema, or obvious scleral/conjunctival abnormalities.  RESP: No audible wheeze, cough, or visible cyanosis.  No visible retractions or increased work of breathing.    SKIN: Visible skin clear. No significant rash, abnormal pigmentation or lesions.  NEURO: Cranial nerves grossly intact.  Mentation and speech appropriate for age.  PSYCH: Mentation appears normal, affect normal/bright, judgement and insight intact, normal speech and appearance well-groomed.    Office Visit on 01/17/2023   Component Date Value Ref Range Status    Case Report 01/17/2023    Final                    Value:Surgical Pathology Report                         Case: [id number]                                "   Authorizing Provider:  Dimitri Rivas MD     Collected:           01/17/2023 11:17 AM          Ordering Location:     [facility]   Received:            01/17/2023 11:17 AM                                 [place]                                                                      Pathologist:           Jolie Wilson MD                                                          Specimens:   A) - Vas Deferens, Left, left                                                                       B) - Vas Deferens, Right, right                                                            Final Diagnosis 01/17/2023    Final                    Value:This result contains rich text formatting which cannot be displayed here.    Clinical Information 01/17/2023    Final                    Value:This result contains rich text formatting which cannot be displayed here.    Gross Description 01/17/2023    Final                    Value:This result contains rich text formatting which cannot be displayed here.    Microscopic Description 01/17/2023    Final                    Value:This result contains rich text formatting which cannot be displayed here.    Performing Labs 01/17/2023    Final                    Value:This result contains rich text formatting which cannot be displayed here.               Video-Visit Details    Type of service:  Video Visit     Originating Location (pt. Location): Home    Distant Location (provider location):  On-site  Platform used for Video Visit: Manuela

## 2023-11-02 ENCOUNTER — TELEPHONE (OUTPATIENT)
Dept: DERMATOLOGY | Facility: CLINIC | Age: 28
End: 2023-11-02
Payer: OTHER GOVERNMENT

## 2023-11-02 NOTE — TELEPHONE ENCOUNTER
Patient called back.  Got him scheduled for 11/7 with Dr. Sweet.  Keeping [place] visit for now.  Just in case he needs a follow up.

## 2023-11-02 NOTE — TELEPHONE ENCOUNTER
[facility] Call Center    Phone Message    May a detailed message be left on voicemail: yes     Reason for Call: Other: Pt would like sooner follow-up/ spot check as he has a flare up and was told by Dr. Hinds to come in when he had his next flare-up. Scheduled next available and added to wait list.      Action Taken: Message routed to:  [facility] ([facility]    Travel Screening: Not Applicable

## 2023-12-30 NOTE — PROGRESS NOTES
Patient presented after waiting 30 minutes with no reaction to allergy injections. Discharged from clinic.    Lucia GRAY RN     no

## 2024-02-02 ENCOUNTER — MYC REFILL (OUTPATIENT)
Dept: FAMILY MEDICINE | Facility: CLINIC | Age: 29
End: 2024-02-02
Payer: OTHER GOVERNMENT

## 2024-02-02 DIAGNOSIS — F90.2 ATTENTION DEFICIT HYPERACTIVITY DISORDER (ADHD), COMBINED TYPE: ICD-10-CM

## 2024-02-02 RX ORDER — DEXTROAMPHETAMINE SACCHARATE, AMPHETAMINE ASPARTATE MONOHYDRATE, DEXTROAMPHETAMINE SULFATE AND AMPHETAMINE SULFATE 5; 5; 5; 5 MG/1; MG/1; MG/1; MG/1
20 CAPSULE, EXTENDED RELEASE ORAL DAILY
Qty: 30 CAPSULE | Refills: 0 | Status: SHIPPED | OUTPATIENT
Start: 2024-02-02 | End: 2024-03-07

## 2024-03-07 ENCOUNTER — MYC REFILL (OUTPATIENT)
Dept: FAMILY MEDICINE | Facility: CLINIC | Age: 29
End: 2024-03-07
Payer: OTHER GOVERNMENT

## 2024-03-07 DIAGNOSIS — F90.2 ATTENTION DEFICIT HYPERACTIVITY DISORDER (ADHD), COMBINED TYPE: ICD-10-CM

## 2024-03-08 RX ORDER — DEXTROAMPHETAMINE SACCHARATE, AMPHETAMINE ASPARTATE MONOHYDRATE, DEXTROAMPHETAMINE SULFATE AND AMPHETAMINE SULFATE 5; 5; 5; 5 MG/1; MG/1; MG/1; MG/1
20 CAPSULE, EXTENDED RELEASE ORAL DAILY
Qty: 30 CAPSULE | Refills: 0 | Status: SHIPPED | OUTPATIENT
Start: 2024-03-08 | End: 2024-04-15

## 2024-03-25 PROBLEM — Z71.85 VACCINE COUNSELING: Status: ACTIVE | Noted: 2024-03-25

## 2024-03-25 PROBLEM — H52.7 DISORDER OF REFRACTION: Status: ACTIVE | Noted: 2023-09-14

## 2024-04-05 NOTE — PROGRESS NOTES
[facility] Dermatology Note  Encounter Date: Feb 8, 2022  Store-and-Forward and Telephone ([phone]). Location of teledermatologist: [facility] [place].  Start time: 1:10 PM. End time: 1:18 PM.    Dermatology Problem List:  1. Acneiform eruption on the nose - favor rosacea.    - Start doxycycline 100 mg PO BID   - Continue metronidazole 0.75% gel topically to nose   - Start tacrolimus 0.1% ointment once daily to nose and eyelids   - Start using vaseline prn for eye and mouth lesions; discontinue using aquaphor  2.  Eyelid dermatitis. Ddx irritant/contact +/- rosacea/periorificial dermatitis  - doxycycline as above   - Start tacrolimus 0.1% ointment once daily to nose and eyelids   - Start using vaseline prn for eye and mouth lesions; discontinue using aquaphor  ____________________________________________    Assessment & Plan:     # Acneiform eruption, bilateral nose. Chronic, flare.   Given distribution and associated flushing, suspect ET and papulopustular rosacea +/- secondary impetiginization. Recommended trial of oral and topical regimen as below. Consider switch to doxycycline at submicrobial dosing at follow-up if improved.   - Start doxycycline 100 mg PO BID; side effects of GI upset and photosensitivity discussed  - Start metronidazole 0.75% cream once  - Consider transition to doxycycline 40 mg PO ER or 20 mg PO BID at sub-microbial dosing at follow-up    Procedures Performed:    None    Follow-up: 6 week(s) in-person, or earlier for new or changing lesions    Staff:     Dieter Hinds MD  Pronouns: he/him/his    Department of Dermatology  [facility]: Phone: [phone], Fax:[phone]  [facility]: Phone: [phone] Fax: [phone]    ____________________________________________    CC: Derm Problem (Acneiform eruption on  "the nose - improved but still flares)    HPI:  Mr. Chago Soni is a(n) 26 year old male who presents today as a return patient for acneiform eruption on the nose. Last seen on 3/22/21 when started on doxycycline 100 mg PO BID and topical therapies including metronidazole 0.75% gel and protopic 0.1% ointment.     He reports recent flare of \"ulcer\" on his nose. He states this comes and goes and is typicaly associated with redness/flushing on the nose. Has responded well in the past to oral antibiotics. Currently, using mupirocin ointment to the area without much improvement.     Patient is otherwise feeling well, without additional skin concerns.    Labs Reviewed:  N/A    Physical Exam:  Vitals: There were no vitals taken for this visit.  SKIN: Teledermatology photos were reviewed; image quality and interpretability: acceptable. Image date: 2/8/22.  - Yellow, crusted papule on the right nose with associated surrounding pink erythema.   - No other lesions of concern on areas examined.                     " intermittent

## 2024-04-15 ENCOUNTER — MYC REFILL (OUTPATIENT)
Dept: FAMILY MEDICINE | Facility: CLINIC | Age: 29
End: 2024-04-15
Payer: OTHER GOVERNMENT

## 2024-04-15 DIAGNOSIS — F90.2 ATTENTION DEFICIT HYPERACTIVITY DISORDER (ADHD), COMBINED TYPE: ICD-10-CM

## 2024-04-15 RX ORDER — DEXTROAMPHETAMINE SACCHARATE, AMPHETAMINE ASPARTATE MONOHYDRATE, DEXTROAMPHETAMINE SULFATE AND AMPHETAMINE SULFATE 5; 5; 5; 5 MG/1; MG/1; MG/1; MG/1
20 CAPSULE, EXTENDED RELEASE ORAL DAILY
Qty: 10 CAPSULE | Refills: 0 | Status: SHIPPED | OUTPATIENT
Start: 2024-04-15 | End: 2024-05-24

## 2024-04-22 ASSESSMENT — ANXIETY QUESTIONNAIRES
3. WORRYING TOO MUCH ABOUT DIFFERENT THINGS: NOT AT ALL
6. BECOMING EASILY ANNOYED OR IRRITABLE: NOT AT ALL
5. BEING SO RESTLESS THAT IT IS HARD TO SIT STILL: SEVERAL DAYS
2. NOT BEING ABLE TO STOP OR CONTROL WORRYING: NOT AT ALL
4. TROUBLE RELAXING: SEVERAL DAYS
IF YOU CHECKED OFF ANY PROBLEMS ON THIS QUESTIONNAIRE, HOW DIFFICULT HAVE THESE PROBLEMS MADE IT FOR YOU TO DO YOUR WORK, TAKE CARE OF THINGS AT HOME, OR GET ALONG WITH OTHER PEOPLE: SOMEWHAT DIFFICULT
7. FEELING AFRAID AS IF SOMETHING AWFUL MIGHT HAPPEN: NOT AT ALL
GAD7 TOTAL SCORE: 2
7. FEELING AFRAID AS IF SOMETHING AWFUL MIGHT HAPPEN: NOT AT ALL
GAD7 TOTAL SCORE: 2
1. FEELING NERVOUS, ANXIOUS, OR ON EDGE: NOT AT ALL
8. IF YOU CHECKED OFF ANY PROBLEMS, HOW DIFFICULT HAVE THESE MADE IT FOR YOU TO DO YOUR WORK, TAKE CARE OF THINGS AT HOME, OR GET ALONG WITH OTHER PEOPLE?: SOMEWHAT DIFFICULT
GAD7 TOTAL SCORE: 2

## 2024-04-22 ASSESSMENT — PATIENT HEALTH QUESTIONNAIRE - PHQ9
SUM OF ALL RESPONSES TO PHQ QUESTIONS 1-9: 1
10. IF YOU CHECKED OFF ANY PROBLEMS, HOW DIFFICULT HAVE THESE PROBLEMS MADE IT FOR YOU TO DO YOUR WORK, TAKE CARE OF THINGS AT HOME, OR GET ALONG WITH OTHER PEOPLE: SOMEWHAT DIFFICULT
SUM OF ALL RESPONSES TO PHQ QUESTIONS 1-9: 1

## 2024-04-23 ENCOUNTER — OFFICE VISIT (OUTPATIENT)
Dept: FAMILY MEDICINE | Facility: CLINIC | Age: 29
End: 2024-04-23
Payer: OTHER GOVERNMENT

## 2024-04-23 VITALS
OXYGEN SATURATION: 98 % | SYSTOLIC BLOOD PRESSURE: 114 MMHG | WEIGHT: 176.1 LBS | BODY MASS INDEX: 24.65 KG/M2 | HEART RATE: 64 BPM | HEIGHT: 71 IN | DIASTOLIC BLOOD PRESSURE: 72 MMHG | TEMPERATURE: 97.5 F

## 2024-04-23 DIAGNOSIS — Z79.899 CONTROLLED SUBSTANCE AGREEMENT SIGNED: ICD-10-CM

## 2024-04-23 DIAGNOSIS — F90.2 ATTENTION DEFICIT HYPERACTIVITY DISORDER (ADHD), COMBINED TYPE: Primary | ICD-10-CM

## 2024-04-23 DIAGNOSIS — J30.2 SEASONAL ALLERGIES: ICD-10-CM

## 2024-04-23 LAB
AMPHETAMINES UR QL SCN: ABNORMAL
BARBITURATES UR QL SCN: ABNORMAL
BENZODIAZ UR QL SCN: ABNORMAL
BZE UR QL SCN: ABNORMAL
CANNABINOIDS UR QL SCN: ABNORMAL
CREAT UR-MCNC: 444 MG/DL
FENTANYL UR QL: ABNORMAL
METHADONE UR QL SCN: NORMAL
OPIATES UR QL SCN: ABNORMAL
PCP QUAL URINE (ROCHE): ABNORMAL

## 2024-04-23 PROCEDURE — 80307 DRUG TEST PRSMV CHEM ANLYZR: CPT | Performed by: NURSE PRACTITIONER

## 2024-04-23 PROCEDURE — 82570 ASSAY OF URINE CREATININE: CPT | Mod: 59 | Performed by: NURSE PRACTITIONER

## 2024-04-23 PROCEDURE — 99213 OFFICE O/P EST LOW 20 MIN: CPT | Performed by: NURSE PRACTITIONER

## 2024-04-23 RX ORDER — DEXTROAMPHETAMINE SACCHARATE, AMPHETAMINE ASPARTATE MONOHYDRATE, DEXTROAMPHETAMINE SULFATE AND AMPHETAMINE SULFATE 5; 5; 5; 5 MG/1; MG/1; MG/1; MG/1
20 CAPSULE, EXTENDED RELEASE ORAL DAILY
Qty: 30 CAPSULE | Refills: 0 | Status: CANCELLED | OUTPATIENT
Start: 2024-04-23

## 2024-04-23 RX ORDER — MONTELUKAST SODIUM 10 MG/1
10 TABLET ORAL AT BEDTIME
Qty: 90 TABLET | Refills: 3 | Status: SHIPPED | OUTPATIENT
Start: 2024-04-23

## 2024-04-23 RX ORDER — FLUTICASONE PROPIONATE 50 MCG
1 SPRAY, SUSPENSION (ML) NASAL DAILY
Qty: 16 G | Refills: 4 | Status: SHIPPED | OUTPATIENT
Start: 2024-04-23

## 2024-04-23 RX ORDER — DEXTROAMPHETAMINE SACCHARATE, AMPHETAMINE ASPARTATE MONOHYDRATE, DEXTROAMPHETAMINE SULFATE AND AMPHETAMINE SULFATE 5; 5; 5; 5 MG/1; MG/1; MG/1; MG/1
20 CAPSULE, EXTENDED RELEASE ORAL DAILY
Qty: 30 CAPSULE | Refills: 0 | Status: SHIPPED | OUTPATIENT
Start: 2024-06-24 | End: 2024-07-24

## 2024-04-23 RX ORDER — DEXTROAMPHETAMINE SACCHARATE, AMPHETAMINE ASPARTATE MONOHYDRATE, DEXTROAMPHETAMINE SULFATE AND AMPHETAMINE SULFATE 5; 5; 5; 5 MG/1; MG/1; MG/1; MG/1
20 CAPSULE, EXTENDED RELEASE ORAL DAILY
Qty: 30 CAPSULE | Refills: 0 | Status: SHIPPED | OUTPATIENT
Start: 2024-04-23 | End: 2024-05-23

## 2024-04-23 RX ORDER — DEXTROAMPHETAMINE SACCHARATE, AMPHETAMINE ASPARTATE MONOHYDRATE, DEXTROAMPHETAMINE SULFATE AND AMPHETAMINE SULFATE 5; 5; 5; 5 MG/1; MG/1; MG/1; MG/1
20 CAPSULE, EXTENDED RELEASE ORAL DAILY
Qty: 30 CAPSULE | Refills: 0 | Status: SHIPPED | OUTPATIENT
Start: 2024-05-24 | End: 2024-06-23

## 2024-04-23 NOTE — LETTER
[facility]  04/23/24  Patient: Chago Soni  YOB: 1995  Medical Record Number: [id number]                                                                                  Non-Opioid Controlled Substance Agreement    This is an agreement between you and your provider regarding safe and appropriate use of controlled substances prescribed by your care team. Controlled substances are?medicines that can cause physical and mental dependence (abuse).     There are strict laws about having and using these medicines. We here at [facility] are  committed to working with you in your efforts to get better. To support you in this work, we'll help you schedule regular office appointments for medicine refills. If we must cancel or change your appointment for any reason, we'll make sure you have enough medicine to last until your next appointment.     As a Provider, I will:   Listen carefully to your concerns while treating you with respect.   Recommend a treatment plan that I believe is in your best interest and may involve therapies other than medicine.    Talk with you often about the possible benefits and the risk of harm of any medicine that we prescribe for you.  Assess the safety of this medicine and check how well it works.    Provide a plan on how to taper (discontinue or go off) using this medicine if the decision is made to stop its use.      ::  As a Patient, I understand controlled substances:     Are prescribed by my care provider to help me function or work and manage my condition(s).?  Are strong medicines and can cause serious side effects.     Need to be taken exactly as prescribed.?Combining controlled substances with certain medicines or chemicals (such as illegal drugs, alcohol, sedatives, sleeping pills, and benzodiazepines) can be dangerous or even fatal.? If I stop taking my medicines suddenly, I may have severe withdrawal symptoms.     The risks, benefits, and  side effects of these medicine(s) were explained to me. I agree that:    I will take part in other treatments as advised by my care team. This may be psychiatry or counseling, physical therapy, behavioral therapy, group treatment or a referral to specialist.    I will keep all my appointments and understand this is part of the monitoring of controlled substances.?My care team may require an office visit for EVERY controlled substance refill. If I miss appointments or don t follow instructions, my care team may stop my medicine    I will take my medicines as prescribed. I will not change the dose or schedule unless my care team tells me to. There will be no refills if I run out early.      I may be asked to come to the clinic and complete a urine drug test or complete a pill count. If I don t give a urine sample or participate in a pill count, the care team may stop my medicine.    I will only receive controlled substance prescriptions from this clinic. If I am treated by another provider, I will tell them that I am taking controlled substances and that I have a treatment agreement with this provider. I will inform my [facility] care team within one business day if I am given a prescription for any controlled substance by another healthcare provider. My [facility] care team can contact other providers and pharmacists about my use of any medicines.    It is up to me to make sure that I don't run out of my medicines on weekends or holidays.?If my care team is willing to refill my prescription without a visit, I must request refills only during office hours. Refills may take up to 3 business days to process. I will use one pharmacy to fill all my controlled substance prescriptions. I will notify the clinic about any changes to my insurance or medicine availability.    I am responsible for my prescriptions. If the medicine/prescription is lost, stolen or destroyed, it will not be replaced.?I also agree not  to share controlled substance medicines with anyone.     I am aware I should not use any illegal or recreational drugs. I agree not to drink alcohol unless my care team says I can.     If I enroll in the [place] [facility] Cannabis program, I will tell my care team before my next refill.    I will tell my care team right away if I become pregnant, have a new medical problem treated outside of my regular clinic, or have a change in my medicines.     I understand that this medicine can affect my thinking, judgment and reaction time.? Alcohol and drugs affect the brain and body, which can affect the safety of my driving. Being under the influence of alcohol or drugs can affect my decision-making, behaviors, personal safety and the safety of others. Driving while impaired (DWI) can occur if a person is driving, operating or in physical control of a car, motorcycle, boat, snowmobile, ATV, motorbike, off-road vehicle or any other motor vehicle ([place] Statute 169A.20). I understand the risk if I choose to drive or operate any vehicle or machinery.    I understand that if I do not follow any of the conditions above, my prescriptions or treatment may be stopped or changed.   I agree that my provider, clinic care team and pharmacy may work with any city, state or federal law enforcement agency that investigates the misuse, sale or other diversion of my controlled medicine. I will allow my provider to discuss my care with, or share a copy of, this agreement with any other treating provider, pharmacy or emergency room where I receive care.     I have read this agreement and have asked questions about anything I did not understand.    ________________________________________________________  Patient Signature - Chago Soni     ___________________                   Date     ________________________________________________________  Provider Signature - PAULA Buitrago CNP       ___________________                   Date      ________________________________________________________  Witness Signature (required if provider not present while patient signing)          ___________________                   Date

## 2024-04-23 NOTE — PROGRESS NOTES
Assessment & Plan     Seasonal allergies  *  - fluticasone (FLONASE) 50 MCG/ACT nasal spray  Dispense: 16 g; Refill: 4  - montelukast (SINGULAIR) 10 MG tablet  Dispense: 90 tablet; Refill: 3    Attention deficit hyperactivity disorder (ADHD), combined type  *  - Urine Drug Screen Methadone Urine Qualitative [id number], Creatinine Urine Random [id number]  - amphetamine-dextroamphetamine (ADDERALL XR) 20 MG 24 hr capsule  Dispense: 30 capsule; Refill: 0  - amphetamine-dextroamphetamine (ADDERALL XR) 20 MG 24 hr capsule  Dispense: 30 capsule; Refill: 0  - amphetamine-dextroamphetamine (ADDERALL XR) 20 MG 24 hr capsule  Dispense: 30 capsule; Refill: 0    Controlled substance agreement signed  *  I reviewed prior chart notes from prior PCP.  It appears that he is doing well on Adderall for ADHD.  PDMP reviewed.  No side effects indicated at our visit today.  Medication can get refilled 20 mg daily, 30 tablets per month.   - drug screen completed.   - declined other  labs.       Subjective   Ross is a 28 year old, presenting for the following health issues:  Recheck Medication (Med check. Renewal on ADHD medicaion, was given 10 day bridge until he could get in today. Refill allergy meds, would like 90 day supply. )    He stated that he has been on Adderall for many years for ADHD.  He finds that this medication works well for him, and denied any medication side effects.  He is an active .  He denied any drug or alcohol abuse.  He stated that he has been sleeping well on medication.   -He takes seasonal allergy medicines if needed.   - works in the [employer] and [facility] [employer]      4/23/2024     7:45 AM   Additional Questions   Roomed by Elizabeth MURPHY MA     History of Present Illness       Reason for visit:  Medication refill and paperwork    He eats 2-3 servings of fruits and vegetables daily.He consumes 0 sweetened beverage(s) daily.He exercises with enough effort to increase his heart rate 60 or more minutes per day.  He  "exercises with enough effort to increase his heart rate 7 days per week. He is missing 1 dose(s) of medications per week.  He is not taking prescribed medications regularly due to other.                 Review of Systems  Constitutional, HEENT, cardiovascular, pulmonary, gi and gu systems are negative, except as otherwise noted.      Objective    /72 (BP Location: Left arm, Patient Position: Sitting, Cuff Size: Adult Regular)   Pulse 64   Temp 97.5  F (36.4  C) (Temporal)   Ht 1.803 m (5' 11\")   Wt 79.9 kg (176 lb 1.6 oz)   SpO2 98%   BMI 24.56 kg/m    Body mass index is 24.56 kg/m .  Physical Exam   GENERAL: alert and no distress    Office Visit on 01/17/2023   Component Date Value Ref Range Status    Case Report 01/17/2023    Final                    Value:Surgical Pathology Report                         Case: [id number]                                  Authorizing Provider:  Dimitri Rivas MD     Collected:           01/17/2023 11:17 AM          Ordering Location:     [facility]   Received:            01/17/2023 11:17 AM                                 [place]                                                                      Pathologist:           Jolie Wilson MD                                                          Specimens:   A) - Vas Deferens, Left, left                                                                       B) - Vas Deferens, Right, right                                                            Final Diagnosis 01/17/2023    Final                    Value:This result contains rich text formatting which cannot be displayed here.    Clinical Information 01/17/2023    Final                    Value:This result contains rich text formatting which cannot be displayed here.    Gross Description 01/17/2023    Final                    Value:This result contains rich text formatting which cannot be displayed here.    Microscopic Description 01/17/2023    Final "                    Value:This result contains rich text formatting which cannot be displayed here.    Performing Labs 01/17/2023    Final                    Value:This result contains rich text formatting which cannot be displayed here.           Signed Electronically by: PAULA Buitrago CNP

## 2024-05-24 ENCOUNTER — TELEPHONE (OUTPATIENT)
Dept: FAMILY MEDICINE | Facility: CLINIC | Age: 29
End: 2024-05-24
Payer: OTHER GOVERNMENT

## 2024-05-24 DIAGNOSIS — F90.2 ATTENTION DEFICIT HYPERACTIVITY DISORDER (ADHD), COMBINED TYPE: ICD-10-CM

## 2024-05-24 RX ORDER — DEXTROAMPHETAMINE SACCHARATE, AMPHETAMINE ASPARTATE MONOHYDRATE, DEXTROAMPHETAMINE SULFATE AND AMPHETAMINE SULFATE 5; 5; 5; 5 MG/1; MG/1; MG/1; MG/1
20 CAPSULE, EXTENDED RELEASE ORAL DAILY
Qty: 10 CAPSULE | Refills: 0 | Status: SHIPPED | OUTPATIENT
Start: 2024-05-24 | End: 2024-08-01

## 2024-05-24 NOTE — TELEPHONE ENCOUNTER
Medication Question or Refill        What medication are you calling about (include dose and sig)?: Adderall 20mg    Preferred Pharmacy:  [id number] - [place], [place] - [address]  [address] [place] [id number]  Phone: [phone] Fax: [phone]          Controlled Substance Agreement on file:   CSA -- Patient Level:    CSA: None found at the patient level.       Who prescribed the medication?: Jackie    Do you need a refill? Yes    Do you have any questions or concerns?  Yes: [facility] pharmacy called because pt requesting an early fill as he is going out of town

## 2024-06-30 ENCOUNTER — HEALTH MAINTENANCE LETTER (OUTPATIENT)
Age: 29
End: 2024-06-30

## 2024-07-31 DIAGNOSIS — F90.2 ATTENTION DEFICIT HYPERACTIVITY DISORDER (ADHD), COMBINED TYPE: ICD-10-CM

## 2024-08-01 RX ORDER — DEXTROAMPHETAMINE SACCHARATE, AMPHETAMINE ASPARTATE MONOHYDRATE, DEXTROAMPHETAMINE SULFATE AND AMPHETAMINE SULFATE 5; 5; 5; 5 MG/1; MG/1; MG/1; MG/1
20 CAPSULE, EXTENDED RELEASE ORAL DAILY
Qty: 30 CAPSULE | Refills: 0 | Status: SHIPPED | OUTPATIENT
Start: 2024-08-01

## 2024-09-05 DIAGNOSIS — F90.2 ATTENTION DEFICIT HYPERACTIVITY DISORDER (ADHD), COMBINED TYPE: ICD-10-CM

## 2024-09-06 RX ORDER — DEXTROAMPHETAMINE SACCHARATE, AMPHETAMINE ASPARTATE MONOHYDRATE, DEXTROAMPHETAMINE SULFATE AND AMPHETAMINE SULFATE 5; 5; 5; 5 MG/1; MG/1; MG/1; MG/1
20 CAPSULE, EXTENDED RELEASE ORAL DAILY
Qty: 30 CAPSULE | Refills: 0 | Status: SHIPPED | OUTPATIENT
Start: 2024-09-06 | End: 2024-10-06

## 2024-09-06 RX ORDER — DEXTROAMPHETAMINE SACCHARATE, AMPHETAMINE ASPARTATE MONOHYDRATE, DEXTROAMPHETAMINE SULFATE AND AMPHETAMINE SULFATE 5; 5; 5; 5 MG/1; MG/1; MG/1; MG/1
20 CAPSULE, EXTENDED RELEASE ORAL DAILY
Qty: 30 CAPSULE | Refills: 0 | Status: SHIPPED | OUTPATIENT
Start: 2024-11-05 | End: 2024-12-05

## 2024-09-06 RX ORDER — DEXTROAMPHETAMINE SACCHARATE, AMPHETAMINE ASPARTATE MONOHYDRATE, DEXTROAMPHETAMINE SULFATE AND AMPHETAMINE SULFATE 5; 5; 5; 5 MG/1; MG/1; MG/1; MG/1
20 CAPSULE, EXTENDED RELEASE ORAL DAILY
Qty: 30 CAPSULE | Refills: 0 | Status: SHIPPED | OUTPATIENT
Start: 2024-10-06 | End: 2024-11-05

## 2024-09-06 RX ORDER — DEXTROAMPHETAMINE SACCHARATE, AMPHETAMINE ASPARTATE MONOHYDRATE, DEXTROAMPHETAMINE SULFATE AND AMPHETAMINE SULFATE 5; 5; 5; 5 MG/1; MG/1; MG/1; MG/1
20 CAPSULE, EXTENDED RELEASE ORAL DAILY
Qty: 30 CAPSULE | Refills: 0 | OUTPATIENT
Start: 2024-09-06

## 2024-09-30 ENCOUNTER — TELEPHONE (OUTPATIENT)
Dept: FAMILY MEDICINE | Facility: CLINIC | Age: 29
End: 2024-09-30
Payer: OTHER GOVERNMENT

## 2024-09-30 NOTE — TELEPHONE ENCOUNTER
Please advise on sending in a partial script for what they were not able to fill in September or okay to fill the October script early. JANET CHANG on 9/30/2024 at 9:56 AM

## 2024-09-30 NOTE — TELEPHONE ENCOUNTER
FYI - Status Update    Who is Calling: Pharmacy    Update: Pharmacy called to let PCP know that they were only able to fill a partial supply of Adderall. They were only able to fill 16/30. They are wondering if PCP is able to send in another rx or call pharmacy and give okay to fill October rx early, which is supposed to start 10/6/24. Patient is currently out of medication.     Does caller want a call/response back: Yes     Could we send this information to you in [employer] or would you prefer to receive a phone call?:   Patient would prefer a phone call   Okay to leave a detailed message?: Yes at Other phone number:  Carlie [phone].

## 2024-10-02 ENCOUNTER — TELEPHONE (OUTPATIENT)
Dept: FAMILY MEDICINE | Facility: CLINIC | Age: 29
End: 2024-10-02
Payer: OTHER GOVERNMENT

## 2024-10-02 NOTE — TELEPHONE ENCOUNTER
RN called [id number] - [place], [place] and notified of provider message, okay to fill prescription early. Pharmacist did note that patient will be given a 30 refill of amphetamine-dextroamphetamine (ADDERALL XR) 20 MG 24 hr capsule at this time (using 10/6 prescription).

## 2024-10-02 NOTE — TELEPHONE ENCOUNTER
Pharmacy calling in, states they were only able to fill pts Adderall XR 20 mg for 16 tablets last refill due to a shortage. The patient is now out and pharmacy is requesting to refill the 10/6 prescription early.

## 2024-10-09 ENCOUNTER — OFFICE VISIT (OUTPATIENT)
Dept: FAMILY MEDICINE | Facility: CLINIC | Age: 29
End: 2024-10-09
Payer: OTHER GOVERNMENT

## 2024-10-09 VITALS
OXYGEN SATURATION: 100 % | RESPIRATION RATE: 16 BRPM | DIASTOLIC BLOOD PRESSURE: 64 MMHG | HEART RATE: 47 BPM | HEIGHT: 71 IN | WEIGHT: 170 LBS | BODY MASS INDEX: 23.8 KG/M2 | TEMPERATURE: 97.2 F | SYSTOLIC BLOOD PRESSURE: 102 MMHG

## 2024-10-09 DIAGNOSIS — F90.2 ATTENTION DEFICIT HYPERACTIVITY DISORDER (ADHD), COMBINED TYPE: ICD-10-CM

## 2024-10-09 DIAGNOSIS — Z79.899 CONTROLLED SUBSTANCE AGREEMENT SIGNED: ICD-10-CM

## 2024-10-09 DIAGNOSIS — M75.41 IMPINGEMENT SYNDROME, SHOULDER, RIGHT: Primary | ICD-10-CM

## 2024-10-09 DIAGNOSIS — G89.29 CHRONIC RIGHT SHOULDER PAIN: ICD-10-CM

## 2024-10-09 DIAGNOSIS — M25.511 CHRONIC RIGHT SHOULDER PAIN: ICD-10-CM

## 2024-10-09 DIAGNOSIS — F40.243 FEAR OF FLYING: ICD-10-CM

## 2024-10-09 PROCEDURE — G2211 COMPLEX E/M VISIT ADD ON: HCPCS | Performed by: PHYSICIAN ASSISTANT

## 2024-10-09 PROCEDURE — 99214 OFFICE O/P EST MOD 30 MIN: CPT | Performed by: PHYSICIAN ASSISTANT

## 2024-10-09 RX ORDER — DEXTROAMPHETAMINE SACCHARATE, AMPHETAMINE ASPARTATE MONOHYDRATE, DEXTROAMPHETAMINE SULFATE AND AMPHETAMINE SULFATE 5; 5; 5; 5 MG/1; MG/1; MG/1; MG/1
20 CAPSULE, EXTENDED RELEASE ORAL DAILY
Qty: 30 CAPSULE | Refills: 0 | Status: SHIPPED | OUTPATIENT
Start: 2024-12-08 | End: 2025-01-07

## 2024-10-09 RX ORDER — DEXTROAMPHETAMINE SACCHARATE, AMPHETAMINE ASPARTATE MONOHYDRATE, DEXTROAMPHETAMINE SULFATE AND AMPHETAMINE SULFATE 5; 5; 5; 5 MG/1; MG/1; MG/1; MG/1
20 CAPSULE, EXTENDED RELEASE ORAL DAILY
Qty: 30 CAPSULE | Refills: 0 | Status: SHIPPED | OUTPATIENT
Start: 2024-10-09 | End: 2024-11-08

## 2024-10-09 RX ORDER — LORAZEPAM 0.5 MG/1
0.5 TABLET ORAL
Qty: 7 TABLET | Refills: 0 | Status: SHIPPED | OUTPATIENT
Start: 2024-10-09

## 2024-10-09 RX ORDER — DEXTROAMPHETAMINE SACCHARATE, AMPHETAMINE ASPARTATE MONOHYDRATE, DEXTROAMPHETAMINE SULFATE AND AMPHETAMINE SULFATE 5; 5; 5; 5 MG/1; MG/1; MG/1; MG/1
20 CAPSULE, EXTENDED RELEASE ORAL DAILY
Qty: 30 CAPSULE | Refills: 0 | Status: SHIPPED | OUTPATIENT
Start: 2024-11-08 | End: 2024-12-08

## 2024-10-09 ASSESSMENT — PAIN SCALES - GENERAL: PAINLEVEL: MILD PAIN (2)

## 2024-10-09 NOTE — PATIENT INSTRUCTIONS
Please call [facility] at [phone]  to set up the MRI of your shoulder.  Then see ortho after this to review and come up with a treatment plan.

## 2024-10-09 NOTE — PROGRESS NOTES
Assessment & Plan     Impingement syndrome, shoulder, right  Recommend getting an updated MRI and f/up with ortho to determine next steps of treatment.   - MR Shoulder Right w/o Contrast; Future  - Orthopedic  Referral; Future    Chronic right shoulder pain  F/up with ortho after MRI.   - MR Shoulder Right w/o Contrast; Future  - Orthopedic  Referral; Future    Attention deficit hyperactivity disorder (ADHD), combined type   Reviewed medication and he is tolerating well without significant side effects.    I reemphasized the importance of a multi-armed approach towards the treatment of ADD/ADHD including regular exercise, adequate sleep, and a well rounded, low-glycemic diet.  In addition, I emphasized the importance of ongoing development of techniques for task completion and life organization that take into account his challenges.    Will leave medication as is.      CSA signed.     UDS current.   - amphetamine-dextroamphetamine (ADDERALL XR) 20 MG 24 hr capsule; Take 1 capsule (20 mg) by mouth daily.  - amphetamine-dextroamphetamine (ADDERALL XR) 20 MG 24 hr capsule; Take 1 capsule (20 mg) by mouth daily.  - amphetamine-dextroamphetamine (ADDERALL XR) 20 MG 24 hr capsule; Take 1 capsule (20 mg) by mouth daily.    Fear of flying  I discussed the potential side effects of antianxiety medications as well as the liklihood of worsening before improvement over the next few weeks. I reemphasized the importance of a multi-armed approach towards the treatment of anxiety, with medications, lifestyle changes and counseling.  He does see a therapist through the  which is great to hear, no referral needed.  Discussed med options for situational anxiety, specifically hydroxyzine or a benzodiazepine.  Discussed side effects of these.  Will want to monitor use of ativan closely, as it can become addictive.  He is to only use it for flights.  DO not mix either with substances that may cause drowsiness.   I suggest he hold his adderall on days he takes ativan.    If new or worsening symptoms, he will seek help immediately.    - LORazepam (ATIVAN) 0.5 MG tablet; Take 1 tablet (0.5 mg) by mouth once as needed (anxiety with flights).    Controlled substance agreement signed  Signed, no concerns. UDS current.       The longitudinal plan of care for the diagnosis(es)/condition(s) as documented were addressed during this visit. Due to the added complexity in care, I will continue to support Ross in the subsequent management and with ongoing continuity of care.      Subjective   Ross is a 29 year old, presenting for the following health issues:  A.D.H.D        10/9/2024     7:53 AM   Additional Questions   Roomed by Nathaly   Accompanied by Self         10/9/2024     7:53 AM   Patient Reported Additional Medications   Patient reports taking the following new medications NA       New Patient with history of ADHD and Chronic Right Shoulder/Upper R Back Pain arrived to Establish Care for future refills. ADHD questionnaire given in room.     Patient reports he has been seen in the past for his Right Shoulder/Upper R Back over the last couple of years, no known injury per pt. Patient has seen PT and a Chiropractor in the past without much relief.  No injury noted.  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  Pain is constant.   Symptoms have worsened compared to 3 years ago.  Pain feels deep, dull sore pain.      Over the years he has had some difficulty with flying.  If there is any turbulence he will have increased anxiety/near panic attacks.  He has flown a lot through the  and did ok with that.  Over the past few years, he feels his anxiety levels have worsened over the past few years.  He does fly about 15 times per year (personal/vacation).   He does have a therapist with [facility] and feels mental health is stable.       History of Present Illness       Reason for visit:   Continued sleep issues. Shoulder/trap pain. Flying anxiety.  Symptom onset:  More than a month  Symptoms include:  Sleep walking. Back/trap pain. Anxiety during flights.  Symptom intensity:  Moderate  Symptom progression:  Worsening  Had these symptoms before:  Yes  Has tried/received treatment for these symptoms:  Yes  Previous treatment was successful:  No  What makes it better:  Sleep - no. Back - stretching/massage. Flying - no. He is missing 2 dose(s) of medications per week.  He is not taking prescribed medications regularly due to other.       Medication Followup of Adderall  Taking Medication as prescribed: yes  Side Effects:  None  Medication Helping Symptoms:  yes    Please answer the questions below, rating yourself on each of the criteria shown using the scale on the right side of the page. As you answer each question, place an X in the box that best describes how you have felt and conducted yourself over the past 6 months.     Never Rarely Sometimes Often Very Often   1 How often do you have trouble wrapping up the final details of a project once the challenging parts have been done?     x   2 How often do you have difficulty getting things in order when you have to do a task that requires organization?    x    3 How often do you have problems remembering appointments or obligations?    x    4 When you have a task that requires a lot of thought, how often do you avoid or delay getting started?     x   5 How often do you fidget or squirm with your hands or feet when you have to sit down for a long time?     x   6 How often do you feel overly active and compelled to do things, like you were driven by a motor?     x   7 How often do you make careless mistakes when you have to work on a boring or difficult project?    x    8 How often do you have difficulty keeping your attention when you are doing boring or repetitive work?     x   9 How often do you have difficulty concentrating on what people say to you,  "even when they are speaking to you directly?     x   10 How often do you misplace or have difficulty finding things at home or at work?     x   11 How often are you distracted by activity or noise around you?     x   12 How often do you leave your seat in meetings or other situations in which you are expected to remain seated?    x    13 How often do you feel restless or fidgety?     x   14 How often do you have difficulty unwinding and relaxing when you have time to yourself?  x      15 How often do you find yourself talking too much when you are in social situations?     x   16 When you're in a conversation, how often do you find yourself finishing the sentences of the people you are talking to, before they can finish them themselves? x       17 How often do you have difficulty waiting your turn in situations when turn-taking is required?     x   18 How often do you interrupt others when they are busy?  x         Adderall working well.  Does not take every day.           Review of Systems  Constitutional, neuro, ENT, endocrine, pulmonary, cardiac, gastrointestinal, genitourinary, musculoskeletal, integument and psychiatric systems are negative, except as otherwise noted.      Objective    /64 (BP Location: Right arm, Patient Position: Chair, Cuff Size: Adult Regular)   Pulse (!) 47   Temp 97.2  F (36.2  C) (Tympanic)   Resp 16   Ht 1.81 m (5' 11.26\")   Wt 77.1 kg (170 lb)   SpO2 100%   BMI 23.54 kg/m    Body mass index is 23.54 kg/m .  Physical Exam   GENERAL: alert and no distress  NECK: no adenopathy, no asymmetry, masses, or scars  RESP: lungs clear to auscultation - no rales, rhonchi or wheezes  CV: regular rate and rhythm, normal S1 S2, no S3 or S4, no murmur, click or rub, no peripheral edema  ABDOMEN: soft, nontender, no hepatosplenomegaly, no masses and bowel sounds normal  MS: no gross musculoskeletal defects noted, no edema  Inspection: No obvious deformity, asymmetry, muscle atrophy or " abnormal motion noted.   Palpation:  No pain over AC joint, pain noted over SC joint, acromion and subacromial space, no pain over bicipital groove and greater/less tubercles, pain noted over scapular spine and adjacent musculature, no cervical spine tenderness.    External/Internal rotation strength:  limited on right  Abduction/Adduction strength: pain with abduction at 110 degrees on right.   Biceps/Triceps strength: full   Neck examination: normal  ROM/crepitus? Normal.   Capillary refills less than 2 seconds and radial pulses normal bilaterally.   Sensation intact bilateral fingers, hands and arms.    MRI 10/5/21:  Narrative & Impression   EXAM: MR right shoulder without  contrast 10/6/2021 7:29 AM     TECHNIQUE: Multiplanar, multisequence imaging of the right shoulder  were obtained without administration of intravenous or intra-articular  gadolinium contrast using routine protocol.     History: Shoulder pain, rotator cuff disorder suspected, xray done;  Chronic right shoulder pain; Chronic right shoulder pain     Comparison: Radiograph dated 9/27/2021  Findings:     ROTATOR CUFF and ASSOCIATED STRUCTURES  Rotator cuff: Deep to the acromial there is faint edema this in the  distal supraspinatus and infraspinatus tendon tendons and  musculotendinous junctions. Otherwise, the supraspinatus,  infraspinatus, teres minor and subscapularis tendons are intact.      Bursa: Minimal subacromial or subdeltoid bursal fluid.     Musculature: Muscle bulk of rotator cuff is preserved.  Deltoid muscle  bulk is also preserved.  No muscle edema.     Acromioclavicular joint  There are no significant degenerative changes of the acromioclavicular  joint. Acromion has a tiny downsloping tip in sagittal morphology  (series 6, image 8) and there is edema this in the musculotendinous  junction in this area (series 5, images 8 through 10 and series 8,  image 9). Overall the shape of the acromium is type 2 in sagittal  morphology.   Coracoacromial ligament is mildly thickened. The  acromiohumeral interval measures in the sagittal plane at narrow  locations 5 to 6 mm (series 7, image 1).      OSSEOUS STRUCTURES  No fracture, marrow contusion or marrow infiltration.     LONG BICIPITAL TENDON  The long head of the biceps tendon is normally situated within the  bicipital groove. No complete or partial biceps tendon tear is  present.     GLENOHUMERAL JOINT  Joint fluid: Physiologic amount of joint fluid is  present.     Cartilage and subarticular bone:  No focal hyaline cartilage defects  are noted. No Hill-Sachs, reverse Hill-Sachs, or bony Bankart lesions  are seen.     Labrum: Limited assessment on this study with relative lack of joint  distention shows no labral tear.                                                                      Impression:  1. Constellation of subtle findings of mild edema in the tendons and  musculotendinous junction of the supra- and infraspinatus fibers in  their subacromial course, relatively narrow acromiohumeral interval  measuring in its narrowest location 5 to 6 mm, mild thickening of the  coracoacromial ligament suggest a possible clinical syndrome of  subacromial impingement. Recommend clinical correlation.  2. Overall, the shape of the acromion is type II in sagittal  morphology, however there is a very tiny distal downsloping seen as  described above (series 5 and 6, image 8).  3. Otherwise, intact rotator cuff. No evidence of tear. Preserved  muscle bulk.                 Signed Electronically by: Xiao Bermudez PA-C

## 2024-10-09 NOTE — LETTER
[facility]  10/09/24  Patient: Chago Soni  YOB: 1995  Medical Record Number: [id number]                                                                                  Non-Opioid Controlled Substance Agreement    This is an agreement between you and your provider regarding safe and appropriate use of controlled substances prescribed by your care team. Controlled substances are?medicines that can cause physical and mental dependence (abuse).     There are strict laws about having and using these medicines. We here at [facility] are  committed to working with you in your efforts to get better. To support you in this work, we'll help you schedule regular office appointments for medicine refills. If we must cancel or change your appointment for any reason, we'll make sure you have enough medicine to last until your next appointment.     As a Provider, I will:   Listen carefully to your concerns while treating you with respect.   Recommend a treatment plan that I believe is in your best interest and may involve therapies other than medicine.    Talk with you often about the possible benefits and the risk of harm of any medicine that we prescribe for you.  Assess the safety of this medicine and check how well it works.    Provide a plan on how to taper (discontinue or go off) using this medicine if the decision is made to stop its use.      ::  As a Patient, I understand controlled substances:     Are prescribed by my care provider to help me function or work and manage my condition(s).?  Are strong medicines and can cause serious side effects.     Need to be taken exactly as prescribed.?Combining controlled substances with certain medicines or chemicals (such as illegal drugs, alcohol, sedatives, sleeping pills, and benzodiazepines) can be dangerous or even fatal.? If I stop taking my medicines suddenly, I may have severe withdrawal symptoms.     The risks, benefits, and side  effects of these medicine(s) were explained to me. I agree that:    I will take part in other treatments as advised by my care team. This may be psychiatry or counseling, physical therapy, behavioral therapy, group treatment or a referral to specialist.    I will keep all my appointments and understand this is part of the monitoring of controlled substances.?My care team may require an office visit for EVERY controlled substance refill. If I miss appointments or don t follow instructions, my care team may stop my medicine    I will take my medicines as prescribed. I will not change the dose or schedule unless my care team tells me to. There will be no refills if I run out early.      I may be asked to come to the clinic and complete a urine drug test or complete a pill count. If I don t give a urine sample or participate in a pill count, the care team may stop my medicine.    I will only receive controlled substance prescriptions from this clinic. If I am treated by another provider, I will tell them that I am taking controlled substances and that I have a treatment agreement with this provider. I will inform my [facility] care team within one business day if I am given a prescription for any controlled substance by another healthcare provider. My [facility] care team can contact other providers and pharmacists about my use of any medicines.    It is up to me to make sure that I don't run out of my medicines on weekends or holidays.?If my care team is willing to refill my prescription without a visit, I must request refills only during office hours. Refills may take up to 3 business days to process. I will use one pharmacy to fill all my controlled substance prescriptions. I will notify the clinic about any changes to my insurance or medicine availability.    I am responsible for my prescriptions. If the medicine/prescription is lost, stolen or destroyed, it will not be replaced.?I also agree not to  share controlled substance medicines with anyone.     I am aware I should not use any illegal or recreational drugs. I agree not to drink alcohol unless my care team says I can.     If I enroll in the [place] Medical Cannabis program, I will tell my care team before my next refill.    I will tell my care team right away if I become pregnant, have a new medical problem treated outside of my regular clinic, or have a change in my medicines.     I understand that this medicine can affect my thinking, judgment and reaction time.? Alcohol and drugs affect the brain and body, which can affect the safety of my driving. Being under the influence of alcohol or drugs can affect my decision-making, behaviors, personal safety and the safety of others. Driving while impaired (DWI) can occur if a person is driving, operating or in physical control of a car, motorcycle, boat, snowmobile, ATV, motorbike, off-road vehicle or any other motor vehicle ([place] Statute 169A.20). I understand the risk if I choose to drive or operate any vehicle or machinery.    I understand that if I do not follow any of the conditions above, my prescriptions or treatment may be stopped or changed.   I agree that my provider, clinic care team and pharmacy may work with any city, state or federal law enforcement agency that investigates the misuse, sale or other diversion of my controlled medicine. I will allow my provider to discuss my care with, or share a copy of, this agreement with any other treating provider, pharmacy or emergency room where I receive care.     I have read this agreement and have asked questions about anything I did not understand.    ________________________________________________________  Patient Signature - Chago Soni     ___________________                   Date     ________________________________________________________  Provider Signature - Xiao Bermudez PA-C       ___________________                   Date      ________________________________________________________  Witness Signature (required if provider not present while patient signing)          ___________________                   Date

## 2024-10-10 ENCOUNTER — PATIENT OUTREACH (OUTPATIENT)
Dept: CARE COORDINATION | Facility: CLINIC | Age: 29
End: 2024-10-10
Payer: OTHER GOVERNMENT

## 2024-10-14 ENCOUNTER — PATIENT OUTREACH (OUTPATIENT)
Dept: CARE COORDINATION | Facility: CLINIC | Age: 29
End: 2024-10-14
Payer: OTHER GOVERNMENT

## 2024-10-22 ENCOUNTER — ANCILLARY PROCEDURE (OUTPATIENT)
Dept: MRI IMAGING | Facility: CLINIC | Age: 29
End: 2024-10-22
Attending: PHYSICIAN ASSISTANT
Payer: OTHER GOVERNMENT

## 2024-10-22 DIAGNOSIS — M25.511 CHRONIC RIGHT SHOULDER PAIN: ICD-10-CM

## 2024-10-22 DIAGNOSIS — M75.41 IMPINGEMENT SYNDROME, SHOULDER, RIGHT: ICD-10-CM

## 2024-10-22 DIAGNOSIS — G89.29 CHRONIC RIGHT SHOULDER PAIN: ICD-10-CM

## 2024-10-22 PROCEDURE — 73221 MRI JOINT UPR EXTREM W/O DYE: CPT | Mod: RT | Performed by: RADIOLOGY

## 2025-03-07 DIAGNOSIS — F90.2 ATTENTION DEFICIT HYPERACTIVITY DISORDER (ADHD), COMBINED TYPE: ICD-10-CM

## 2025-03-10 RX ORDER — DEXTROAMPHETAMINE SACCHARATE, AMPHETAMINE ASPARTATE MONOHYDRATE, DEXTROAMPHETAMINE SULFATE AND AMPHETAMINE SULFATE 5; 5; 5; 5 MG/1; MG/1; MG/1; MG/1
20 CAPSULE, EXTENDED RELEASE ORAL DAILY
Qty: 30 CAPSULE | Refills: 0 | OUTPATIENT
Start: 2025-03-10 | End: 2025-04-09

## 2025-03-10 RX ORDER — DEXTROAMPHETAMINE SACCHARATE, AMPHETAMINE ASPARTATE MONOHYDRATE, DEXTROAMPHETAMINE SULFATE AND AMPHETAMINE SULFATE 5; 5; 5; 5 MG/1; MG/1; MG/1; MG/1
20 CAPSULE, EXTENDED RELEASE ORAL DAILY
Qty: 30 CAPSULE | Refills: 0 | Status: SHIPPED | OUTPATIENT
Start: 2025-05-09 | End: 2025-06-08

## 2025-03-10 RX ORDER — DEXTROAMPHETAMINE SACCHARATE, AMPHETAMINE ASPARTATE MONOHYDRATE, DEXTROAMPHETAMINE SULFATE AND AMPHETAMINE SULFATE 5; 5; 5; 5 MG/1; MG/1; MG/1; MG/1
20 CAPSULE, EXTENDED RELEASE ORAL DAILY
Qty: 30 CAPSULE | Refills: 0 | Status: SHIPPED | OUTPATIENT
Start: 2025-03-10 | End: 2025-04-09

## 2025-03-10 RX ORDER — DEXTROAMPHETAMINE SACCHARATE, AMPHETAMINE ASPARTATE MONOHYDRATE, DEXTROAMPHETAMINE SULFATE AND AMPHETAMINE SULFATE 5; 5; 5; 5 MG/1; MG/1; MG/1; MG/1
20 CAPSULE, EXTENDED RELEASE ORAL DAILY
Qty: 30 CAPSULE | Refills: 0 | Status: SHIPPED | OUTPATIENT
Start: 2025-04-09 | End: 2025-05-09

## 2025-04-10 ENCOUNTER — VIRTUAL VISIT (OUTPATIENT)
Dept: FAMILY MEDICINE | Facility: CLINIC | Age: 30
End: 2025-04-10
Payer: COMMERCIAL

## 2025-04-10 ENCOUNTER — MYC MEDICAL ADVICE (OUTPATIENT)
Dept: FAMILY MEDICINE | Facility: CLINIC | Age: 30
End: 2025-04-10

## 2025-04-10 DIAGNOSIS — F40.243 FEAR OF FLYING: ICD-10-CM

## 2025-04-10 RX ORDER — LORAZEPAM 0.5 MG/1
0.5 TABLET ORAL
Qty: 8 TABLET | Refills: 0 | Status: SHIPPED | OUTPATIENT
Start: 2025-04-10

## 2025-04-10 ASSESSMENT — ANXIETY QUESTIONNAIRES
GAD7 TOTAL SCORE: 5
7. FEELING AFRAID AS IF SOMETHING AWFUL MIGHT HAPPEN: NOT AT ALL
5. BEING SO RESTLESS THAT IT IS HARD TO SIT STILL: MORE THAN HALF THE DAYS
4. TROUBLE RELAXING: SEVERAL DAYS
7. FEELING AFRAID AS IF SOMETHING AWFUL MIGHT HAPPEN: NOT AT ALL
1. FEELING NERVOUS, ANXIOUS, OR ON EDGE: SEVERAL DAYS
2. NOT BEING ABLE TO STOP OR CONTROL WORRYING: NOT AT ALL
GAD7 TOTAL SCORE: 5
8. IF YOU CHECKED OFF ANY PROBLEMS, HOW DIFFICULT HAVE THESE MADE IT FOR YOU TO DO YOUR WORK, TAKE CARE OF THINGS AT HOME, OR GET ALONG WITH OTHER PEOPLE?: SOMEWHAT DIFFICULT
3. WORRYING TOO MUCH ABOUT DIFFERENT THINGS: NOT AT ALL
GAD7 TOTAL SCORE: 5
IF YOU CHECKED OFF ANY PROBLEMS ON THIS QUESTIONNAIRE, HOW DIFFICULT HAVE THESE PROBLEMS MADE IT FOR YOU TO DO YOUR WORK, TAKE CARE OF THINGS AT HOME, OR GET ALONG WITH OTHER PEOPLE: SOMEWHAT DIFFICULT
6. BECOMING EASILY ANNOYED OR IRRITABLE: SEVERAL DAYS

## 2025-04-10 ASSESSMENT — ENCOUNTER SYMPTOMS: NERVOUS/ANXIOUS: 1

## 2025-04-10 NOTE — PROGRESS NOTES
Ross is a 29 year old who is being evaluated via a billable video visit.    How would you like to obtain your AVS? MyChart  If the video visit is dropped, the invitation should be resent by: Text to cell phone: [phone]  Will anyone else be joining your video visit? No      Assessment & Plan     Fear of flying  Uses ativan sparingly and only for flights, it works very well.    Does not take adderall on the travel days.   I reviewed  website- prescription pattern NOT consistent with potential divergence and IS consistent with our available records    - LORazepam (ATIVAN) 0.5 MG tablet; Take 1 tablet (0.5 mg) by mouth once as needed (anxiety with flights).        Ok to message me if he needs another refill for additional flights going forward (as he uses it very sparingly).         Subjective   Ross is a 29 year old, presenting for the following health issues:  Anxiety        4/10/2025    12:30 PM   Additional Questions   Roomed by Nathaly   Accompanied by Self check in       Patient with Fear of Flying requesting Anxiety medication refill for upcoming Flight. GAD7 completed online.     Has 2 direct flights and then 2 more in May 2025.    Has used ativan in the past (when boarding).   It has helped him nicely with previous flights and helps if there is turbulence.    Does not drive after taking it.   Does not use any other substances that can cause drowsiness with it.     Anxiety    History of Present Illness       Reason for visit:  Med refill    He eats 2-3 servings of fruits and vegetables daily.He consumes 0 sweetened beverage(s) daily.He exercises with enough effort to increase his heart rate 60 or more minutes per day.  He exercises with enough effort to increase his heart rate 7 days per week. He is missing 2 dose(s) of medications per week.  He is not taking prescribed medications regularly due to other.        Social History     Tobacco Use    Smoking status: Never     Passive exposure: Never    Smokeless  tobacco: Never   Vaping Use    Vaping status: Never Used   Substance Use Topics    Alcohol use: Yes     Comment: 6 drinks per week    Drug use: Never         10/17/2022     9:06 AM 4/22/2024    10:40 PM 4/10/2025    11:44 AM   PILO-7 SCORE   Total Score  2 (minimal anxiety) 5 (mild anxiety)   Total Score 6 2 5        Patient-reported         7/6/2022     8:53 AM 10/17/2022     9:06 AM 4/22/2024    10:39 PM   PHQ   PHQ-9 Total Score 9 8 1   Q9: Thoughts of better off dead/self-harm past 2 weeks Not at all Not at all Not at all         4/10/2025    11:44 AM   PILO-7    1. Feeling nervous, anxious, or on edge 1   2. Not being able to stop or control worrying 0   3. Worrying too much about different things 0   4. Trouble relaxing 1   5. Being so restless that it is hard to sit still 2   6. Becoming easily annoyed or irritable 1   7. Feeling afraid, as if something awful might happen 0   PILO-7 Total Score 5    If you checked any problems, how difficult have they made it for you to do your work, take care of things at home, or get along with other people? Somewhat difficult       Patient-reported             Review of Systems  Constitutional, HEENT, cardiovascular, pulmonary, gi and gu systems are negative, except as otherwise noted.      Objective           Vitals:  No vitals were obtained today due to virtual visit.    Physical Exam   GENERAL: alert and no distress  EYES: Eyes grossly normal to inspection.  No discharge or erythema, or obvious scleral/conjunctival abnormalities.  RESP: No audible wheeze, cough, or visible cyanosis.    SKIN: Visible skin clear. No significant rash, abnormal pigmentation or lesions.  NEURO: Cranial nerves grossly intact.  Mentation and speech appropriate for age.  PSYCH: Appropriate affect, tone, and pace of words          Video-Visit Details    Type of service:  Video Visit   Originating Location (pt. Location): Other parking lot    Distant Location (provider location):  Off-site  Platform  used for Video Visit: Doximity  Signed Electronically by: Xiao Bermudez PA-C

## 2025-04-20 DIAGNOSIS — J30.2 SEASONAL ALLERGIES: ICD-10-CM

## 2025-04-22 RX ORDER — MONTELUKAST SODIUM 10 MG/1
10 TABLET ORAL AT BEDTIME
Qty: 90 TABLET | Refills: 2 | Status: SHIPPED | OUTPATIENT
Start: 2025-04-22

## 2025-04-24 ENCOUNTER — VIRTUAL VISIT (OUTPATIENT)
Dept: FAMILY MEDICINE | Facility: CLINIC | Age: 30
End: 2025-04-24
Payer: COMMERCIAL

## 2025-04-24 DIAGNOSIS — R51.9 ACUTE NONINTRACTABLE HEADACHE, UNSPECIFIED HEADACHE TYPE: ICD-10-CM

## 2025-04-24 DIAGNOSIS — J34.89 SINUS PRESSURE: Primary | ICD-10-CM

## 2025-04-24 NOTE — LETTER
[facility]  [address] [address]  Phone: [phone]    April 24, 2025        Chago Soni  [address] [address]          To whom it may concern:    RE: Chago Anders was evaluated today and due to medical reasons, I suggest he avoid loud noises and being around firearms through April 28, 2025.     Please contact me for questions or concerns.      Sincerely,    Xiao Bermudez PA-C

## 2025-04-24 NOTE — PROGRESS NOTES
Ross is a 29 year old who is being evaluated via a billable video visit.    How would you like to obtain your AVS? MyChart  If the video visit is dropped, the invitation should be resent by: Text to cell phone: [phone]  Will anyone else be joining your video visit? No      Assessment & Plan     Sinus pressure  URI (Upper Respiratory Infection)  (primary encounter diagnosis)    I discussed the pathophysiology of upper respiratory infections and likely viral etiology.   Discussed general respiratory tract infection care including importance of hydration, rest, over the counter therapies and techniques to prevent future infection as well as transmission to others.  Discussed signs or symptoms that would indicate need for recheck.    Patient was instructed to f/u or call if symptoms worsen or fail to improve as anticipated.    Recommend tylenol or OTC cough/cold meds.     - Influenza A/B, RSV and SARS-CoV2 PCR (COVID-19) Nose; Future    Acute nonintractable headache, unspecified headache type  Etiology most consistent with viral URI.    - Influenza A/B, RSV and SARS-CoV2 PCR (COVID-19) Nose; Future    Letter for work written.     F/up for swabs if symptoms do not improve as expected.         Subjective   Ross is a 29 year old, presenting for the following health issues:  Headache and Nausea        4/24/2025    11:24 AM   Additional Questions   Roomed by Nathaly   Accompanied by Self         4/24/2025    11:24 AM   Patient Reported Additional Medications   Patient reports taking the following new medications NA     HPI      Patient arrived to discuss symptoms of continuing Headache and Nausea x 3 days.     Onset/Duration: 3 days  Description  Location: bilateral in the frontal area   Character: dull pain  Frequency:  Constant   Duration:  Constant for 3 days   Wake with headaches: YES  Able to do daily activities when headache present: YES  Intensity:  moderate  Progression of Symptoms:  constant  Accompanying signs and  symptoms:  Stiff neck: No  Neck or upper back pain: YES - Upper Back but per pt this is not new  Sinus or URI symptoms: no   Fever: Felt warm, no fevers.    Nausea or vomiting: YES- Nausea without vomiting   Dizziness: YES  Numbness/tingling: No  Weakness: YES  Visual changes: none  History  Head trauma: No  Family history of migraines: No  Daily pain medication use: No  Previous tests for headaches: No  Neurologist evaluation: No  Precipitating or Alleviating factors (light/sound/sleep/caffeine): Light and Loud Sounds make Headaches worsen   Therapies tried and outcome: Cold Compression and Laying Down               Outcome -  usually effective  Frequent/daily pain medication use: No      No cough, congestion or sore throat.  No other traditional URI/flu like symptoms.   He has had some brain fog and physically he feels a little weak.      He has not had a headache like this before.   Was around Mom and girlfriends Dad last week (when in [place]).  Did recently travel on plane.     Stayed home from work the past few days.   Headache does not wake him up at night.      Pain is a dull ache and not getting any worse or better.   No recent head trauma.     Home covid test negative.      Has  drill this weekend and will be shooting guns and he is concerned about how this will affect his headache.    Would like a letter to avoid being around loud noises and guns (especially with his mental fogginess).          Review of Systems  Constitutional, neuro, ENT, endocrine, pulmonary, cardiac, gastrointestinal, genitourinary, musculoskeletal, integument and psychiatric systems are negative, except as otherwise noted.      Objective           Vitals:  No vitals were obtained today due to virtual visit.    Physical Exam   GENERAL: alert and no distress  EYES: Eyes grossly normal to inspection.  No discharge or erythema, or obvious scleral/conjunctival abnormalities.  RESP: No audible wheeze, cough, or visible cyanosis.     SKIN: Visible skin clear. No significant rash, abnormal pigmentation or lesions.  NEURO: Cranial nerves grossly intact.  Mentation and speech appropriate for age.  PSYCH: Appropriate affect, tone, and pace of words          Video-Visit Details    Type of service:  Video Visit   Originating Location (pt. Location): Other in parking lot    Distant Location (provider location):  On-site  Platform used for Video Visit: Manuela  Signed Electronically by: Xiao Bermudez PA-C

## 2025-06-18 ENCOUNTER — TELEPHONE (OUTPATIENT)
Dept: FAMILY MEDICINE | Facility: CLINIC | Age: 30
End: 2025-06-18
Payer: COMMERCIAL

## 2025-06-18 DIAGNOSIS — F90.2 ATTENTION DEFICIT HYPERACTIVITY DISORDER (ADHD), COMBINED TYPE: ICD-10-CM

## 2025-06-23 RX ORDER — DEXTROAMPHETAMINE SACCHARATE, AMPHETAMINE ASPARTATE MONOHYDRATE, DEXTROAMPHETAMINE SULFATE AND AMPHETAMINE SULFATE 5; 5; 5; 5 MG/1; MG/1; MG/1; MG/1
20 CAPSULE, EXTENDED RELEASE ORAL DAILY
Qty: 30 CAPSULE | Refills: 0 | OUTPATIENT
Start: 2025-06-23 | End: 2025-07-23

## 2025-06-23 NOTE — TELEPHONE ENCOUNTER
Please call [facility] and schedule a physical and ADHD med recheck (in person).  Ok to use same day or provider approval slot    Xiao Bermudez PA-C

## 2025-06-24 NOTE — TELEPHONE ENCOUNTER
Xiao  I have scheduled [facility] for the first available PAS on 7/2/2025. The issue is he has been out of medication for over a week now. Would you please be willing to supply enough medication until the appointment or a 30 day arianna refill please?  Please advise and contact the patient.    Thank you,  Gabriel Lazaro Registration

## 2025-06-26 RX ORDER — DEXTROAMPHETAMINE SACCHARATE, AMPHETAMINE ASPARTATE MONOHYDRATE, DEXTROAMPHETAMINE SULFATE AND AMPHETAMINE SULFATE 5; 5; 5; 5 MG/1; MG/1; MG/1; MG/1
20 CAPSULE, EXTENDED RELEASE ORAL DAILY
Qty: 30 CAPSULE | Refills: 0 | Status: SHIPPED | OUTPATIENT
Start: 2025-06-26

## 2025-06-26 NOTE — TELEPHONE ENCOUNTER
Called patient and let him know that provider did send in a arianna refill for his Adderall. He will make sure to attend his appointment with Xiao Bermudez PA-C on 7/2/2025.     Christine James, CONSTANCEN/RN  [facility]

## 2025-07-02 ENCOUNTER — OFFICE VISIT (OUTPATIENT)
Dept: FAMILY MEDICINE | Facility: CLINIC | Age: 30
End: 2025-07-02
Payer: COMMERCIAL

## 2025-07-02 VITALS
HEIGHT: 71 IN | BODY MASS INDEX: 23.94 KG/M2 | TEMPERATURE: 97.3 F | SYSTOLIC BLOOD PRESSURE: 124 MMHG | OXYGEN SATURATION: 99 % | HEART RATE: 53 BPM | WEIGHT: 171 LBS | RESPIRATION RATE: 16 BRPM | DIASTOLIC BLOOD PRESSURE: 80 MMHG

## 2025-07-02 DIAGNOSIS — Z13.6 CARDIOVASCULAR SCREENING; LDL GOAL LESS THAN 160: ICD-10-CM

## 2025-07-02 DIAGNOSIS — J30.2 SEASONAL ALLERGIES: ICD-10-CM

## 2025-07-02 DIAGNOSIS — Z00.00 ROUTINE GENERAL MEDICAL EXAMINATION AT A HEALTH CARE FACILITY: Primary | ICD-10-CM

## 2025-07-02 DIAGNOSIS — F90.2 ATTENTION DEFICIT HYPERACTIVITY DISORDER (ADHD), COMBINED TYPE: ICD-10-CM

## 2025-07-02 DIAGNOSIS — Z79.899 CONTROLLED SUBSTANCE AGREEMENT SIGNED: ICD-10-CM

## 2025-07-02 DIAGNOSIS — G47.36 HYPOXEMIA ASSOCIATED WITH SLEEP: ICD-10-CM

## 2025-07-02 LAB
BASOPHILS # BLD AUTO: 0.1 10E3/UL (ref 0–0.2)
BASOPHILS NFR BLD AUTO: 1 %
EOSINOPHIL # BLD AUTO: 0.7 10E3/UL (ref 0–0.7)
EOSINOPHIL NFR BLD AUTO: 14 %
ERYTHROCYTE [DISTWIDTH] IN BLOOD BY AUTOMATED COUNT: 12.7 % (ref 10–15)
HCT VFR BLD AUTO: 49.5 % (ref 40–53)
HGB BLD-MCNC: 16.9 G/DL (ref 13.3–17.7)
IMM GRANULOCYTES # BLD: 0 10E3/UL
IMM GRANULOCYTES NFR BLD: 0 %
LYMPHOCYTES # BLD AUTO: 1.8 10E3/UL (ref 0.8–5.3)
LYMPHOCYTES NFR BLD AUTO: 32 %
MCH RBC QN AUTO: 30.7 PG (ref 26.5–33)
MCHC RBC AUTO-ENTMCNC: 34.1 G/DL (ref 31.5–36.5)
MCV RBC AUTO: 90 FL (ref 78–100)
MONOCYTES # BLD AUTO: 0.3 10E3/UL (ref 0–1.3)
MONOCYTES NFR BLD AUTO: 6 %
NEUTROPHILS # BLD AUTO: 2.6 10E3/UL (ref 1.6–8.3)
NEUTROPHILS NFR BLD AUTO: 47 %
PLATELET # BLD AUTO: 212 10E3/UL (ref 150–450)
RBC # BLD AUTO: 5.5 10E6/UL (ref 4.4–5.9)
WBC # BLD AUTO: 5.5 10E3/UL (ref 4–11)

## 2025-07-02 PROCEDURE — G2211 COMPLEX E/M VISIT ADD ON: HCPCS | Performed by: PHYSICIAN ASSISTANT

## 2025-07-02 PROCEDURE — 3079F DIAST BP 80-89 MM HG: CPT | Performed by: PHYSICIAN ASSISTANT

## 2025-07-02 PROCEDURE — 86431 RHEUMATOID FACTOR QUANT: CPT | Performed by: PHYSICIAN ASSISTANT

## 2025-07-02 PROCEDURE — 36415 COLL VENOUS BLD VENIPUNCTURE: CPT | Performed by: PHYSICIAN ASSISTANT

## 2025-07-02 PROCEDURE — 99395 PREV VISIT EST AGE 18-39: CPT | Performed by: PHYSICIAN ASSISTANT

## 2025-07-02 PROCEDURE — 99215 OFFICE O/P EST HI 40 MIN: CPT | Mod: 25 | Performed by: PHYSICIAN ASSISTANT

## 2025-07-02 PROCEDURE — 80061 LIPID PANEL: CPT | Performed by: PHYSICIAN ASSISTANT

## 2025-07-02 PROCEDURE — 99417 PROLNG OP E/M EACH 15 MIN: CPT | Performed by: PHYSICIAN ASSISTANT

## 2025-07-02 PROCEDURE — 80053 COMPREHEN METABOLIC PANEL: CPT | Performed by: PHYSICIAN ASSISTANT

## 2025-07-02 PROCEDURE — 86235 NUCLEAR ANTIGEN ANTIBODY: CPT | Performed by: PHYSICIAN ASSISTANT

## 2025-07-02 PROCEDURE — 3074F SYST BP LT 130 MM HG: CPT | Performed by: PHYSICIAN ASSISTANT

## 2025-07-02 PROCEDURE — 85025 COMPLETE CBC W/AUTO DIFF WBC: CPT | Performed by: PHYSICIAN ASSISTANT

## 2025-07-02 RX ORDER — LISDEXAMFETAMINE DIMESYLATE 30 MG/1
30 CAPSULE ORAL EVERY MORNING
Qty: 30 CAPSULE | Refills: 0 | Status: SHIPPED | OUTPATIENT
Start: 2025-07-02 | End: 2025-07-03

## 2025-07-02 RX ORDER — FLUTICASONE PROPIONATE 50 MCG
1 SPRAY, SUSPENSION (ML) NASAL DAILY
Qty: 16 G | Refills: 4 | Status: SHIPPED | OUTPATIENT
Start: 2025-07-02

## 2025-07-02 SDOH — HEALTH STABILITY: PHYSICAL HEALTH: ON AVERAGE, HOW MANY DAYS PER WEEK DO YOU ENGAGE IN MODERATE TO STRENUOUS EXERCISE (LIKE A BRISK WALK)?: 7 DAYS

## 2025-07-02 ASSESSMENT — SOCIAL DETERMINANTS OF HEALTH (SDOH): HOW OFTEN DO YOU GET TOGETHER WITH FRIENDS OR RELATIVES?: MORE THAN THREE TIMES A WEEK

## 2025-07-02 NOTE — PROGRESS NOTES
Preventive Care Visit  [facility] ROSALIND Bermudez PA-C, Family Medicine  Jul 2, 2025      Assessment & Plan     Routine general medical examination at a health care facility      HEALTH CARE MAINTENANCE              Reviewed [facility] recommendations and anticipatory guidance.              See orders.     Check with your  records when your last Tetanus shot was.  Last on file here was 1/21/2015.  You should have one every 10 years.  (Declined Tdap today)    Attention deficit hyperactivity disorder (ADHD), combined type    Switch adderall to vyvanse.  Message me if you feel this works better or worse then adderall in a couple weeks (don't message me the week of 7/21/25).  - lisdexamfetamine (VYVANSE) 30 MG capsule; Take 1 capsule (30 mg) by mouth every morning.    Seasonal allergies  Will continue with flonase only.  Patient instructions:  I recommend you stop singulair x 6 months and see if this affects any of the sleep issues (as this is a known side effect)    - fluticasone (FLONASE) 50 MCG/ACT nasal spray; Spray 1 spray into both nostrils daily.    Controlled substance agreement signed  Kristyn    Hypoxemia associated with sleep  Reviewed previous work up and specialist notes.  Last note from pulmonary recommended labs, echo bubble and a CT scan (the last CXR was normal from 3/24/23, therefore will hold off on the CT scan, he denies any breathing difficulties).   Recommend he start back with therapist.   May need a medication to help with night terrors, will consult with sleep specialist.   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 This would be specifically concerning in areas of combat and symptoms likely would worsen.  May need forms from medical team.      Patient instructions:      I recommend you stop  singulair x 6 months and see if this affects any of the sleep issues (as this is a known side effect)    Schedule echo with bubble.    Please call [phone] to schedule cardiology services.     Checking blood work today that was advised by pulmonary specialist for the nighttime hypoxia.    If labs and echo normal, then I suggest you do a consult with a sleep specialist to determine next steps to help with nighttime symptoms.   - Adult Sleep Eval & Management  Referral; Future  - CBC with platelets and differential  - Anti Nuclear Corina IgG by IFA with Reflex  - Scleroderma Antibody Scl70 OCTAVIA IgG  - Rheumatoid factor  - ANCA IgG by IFA with Reflex to Titer  - Echocardiogram Complete with Bubble Study; Future  - Comprehensive metabolic panel (BMP + Alb, Alk Phos, ALT, AST, Total. Bili, TP)    CARDIOVASCULAR SCREENING; LDL GOAL LESS THAN 160  Due for screening.   - Lipid panel reflex to direct LDL Fasting    Patient has been advised of split billing requirements and indicates understanding: Yes      Reviewed preventive health counseling, as reflected in patient instructions       Regular exercise       Healthy diet/nutrition  Counseling  Appropriate preventive services were addressed with this patient via screening, questionnaire, or discussion as appropriate for fall prevention, nutrition, physical activity, Tobacco-use cessation, social engagement, weight loss and cognition.  Checklist reviewing preventive services available has been given to the patient.  Reviewed patient's diet, addressing concerns and/or questions.   He is at risk for psychosocial distress and has been provided with information to reduce risk.         The longitudinal plan of care for the diagnosis(es)/condition(s) as documented were addressed during this visit. Due to the added complexity in care, I will continue to support Ross in the subsequent management and with ongoing continuity of care.    60 minutes spent by me on the date of the  "encounter doing chart review, history and exam, documentation and further activities per the note, approximately 45 minutes spent discussing conditions out of the scope of the annual physical.       Subjective   Ross is a 29 year old, presenting for the following:  Physical        7/2/2025    10:51 AM   Additional Questions   Roomed by Nathaly   Accompanied by Self         7/2/2025    10:51 AM   Patient Reported Additional Medications   Patient reports taking the following new medications NA            HPI       Patient with history of ADHD arrived for Annual Physical.    Patient is not fasting for lab work.    Patient would also like to discuss results from Sleep Study in 11/07/22 for Hypoxemia.  Seen by pulmonary specialist 3/24/23 and note reviewed:   \"Nocturnal Hypoxia (10/2022 sleep study: low O2 84.0%, Time Spent <=88% 59.4 minutes / Time Spent <=89% 207.0 minutes)  Significant positional desaturation in clinic (desaturation from 100% while sitting upright to 95% while lying flat with rapid improvement to 100% with sitting upright on room air)  Chronic chest tightness and shortness of breath when laying flat with improvement when sitting upright  Normal PFTs  Hx of seasonal allergic rhinitis/conjunctivitis with L maxillary sinus polyps\"  Further testing was advised: including echocardiogram, non-contrast CT chest (to f/up abnormal CXR), -CBC with differential, TEJ, SCL 70, RF, ANCA  (non of these tests were done). These tests were not completed.      No issues with breathing at night.   The primary issue has been sleep walking and talking and having fight/flight reactions at night.  He wakes up with heart racing and feeling very sweaty.  He has a nighttime camera that he installed and he is finding himself looking like he is in a panic state when he gets up in the middle of the night.  Typically he doesn't recall this, however other times he does feel a sense of impending dome/worry/stress and this can be " bothersome when he wakes up.   Seems to be worsening.   He wakes up not feeling fully rested (despite getting a full 8 hours of sleep).   Tried limiting screens, food before bed.  No improvement.      There is a history of trauma in his past (likely PTSD from ).  Did do counseling in the past (not for the past few months due to his schedules).  Going to therapy has helped his daytime symptoms/mental health, but not the nighttime fight or flight issues.  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     Seasonal allergies:  Takes flovent consistently.  Has been on singulair for many years.   Doesn't take singulair consistently.  Allergies are the worst in Spring.      Adult ADD/ADHD Medication Follow up    Since you last visit, how has your ADD/ADHD been? Stable  Any concerns regarding your current medication dosage? No  Are you having any new side effects from the medication? No  Any questions regarding your ADHD/ADD or medications? No     Doing ok with current medication.  Will take days off at times.  He has noticed much improvement overall since being on adderall.   He did have sleep issues prior to starting adderall.  It seems like it worked very well at first, however now there may be some room for improvement.  Able to fall asleep fine without issues and no changes in sleep quality on the nights he takes the med versus not.   He does feel like sometimes the adderall can drop off in effectiveness in the evening (as sometimes he does feel a drop off).     Before his ADHD testing was done, he did a trial on strattera and wellbutrin and he didn't notice any change with those.              Advance Care Planning            7/2/2025   General Health   How would you rate your overall physical health? Excellent   Feel stress (tense, anxious, or unable to sleep) To some extent   (!) STRESS CONCERN      7/2/2025   Nutrition   Three or more servings  of calcium each day? Yes   Diet: Regular (no restrictions)   How many servings of fruit and vegetables per day? (!) 2-3   How many sweetened beverages each day? 0-1         7/2/2025   Exercise   Days per week of moderate/strenous exercise 7 days         7/2/2025   Social Factors   Frequency of gathering with friends or relatives More than three times a week   Worry food won't last until get money to buy more No   Food not last or not have enough money for food? No   Do you have housing? (Housing is defined as stable permanent housing and does not include staying outside in a car, in a tent, in an abandoned building, in an overnight shelter, or couch-surfing.) Yes   Are you worried about losing your housing? No   Lack of transportation? No   Unable to get utilities (heat,electricity)? No         7/2/2025   Dental   Dentist two times every year? Yes           Today's PHQ-2 Score:       4/10/2025    10:56 AM   PHQ-2 ( 1999 [employer])   Q1: Little interest or pleasure in doing things 0   Q2: Feeling down, depressed or hopeless 0   PHQ-2 Score 0    Q1: Little interest or pleasure in doing things Not at all   Q2: Feeling down, depressed or hopeless Not at all   PHQ-2 Score 0       Patient-reported         7/2/2025   Substance Use   Alcohol more than 3/day or more than 7/wk No   Do you use any other substances recreationally? No     Social History     Tobacco Use    Smoking status: Never     Passive exposure: Never    Smokeless tobacco: Never   Vaping Use    Vaping status: Never Used   Substance Use Topics    Alcohol use: Yes     Comment: 6 drinks per week    Drug use: Never           7/2/2025   STI Screening   New sexual partner(s) since last STI/HIV test? No        Reviewed and updated as needed this visit by Provider                    No past medical history on file.  No past surgical history on file.  Lab work is in process      Review of Systems  Constitutional, neuro, ENT, endocrine, pulmonary, cardiac,  "gastrointestinal, genitourinary, musculoskeletal, integument and psychiatric systems are negative, except as otherwise noted.     Objective    Exam  /80 (BP Location: Left arm, Patient Position: Chair, Cuff Size: Adult Regular)   Pulse 53   Temp 97.3  F (36.3  C) (Temporal)   Resp 16   Ht 1.8 m (5' 10.87\")   Wt 77.6 kg (171 lb)   SpO2 99%   BMI 23.94 kg/m     Estimated body mass index is 23.94 kg/m  as calculated from the following:    Height as of this encounter: 1.8 m (5' 10.87\").    Weight as of this encounter: 77.6 kg (171 lb).    Physical Exam  GENERAL: alert and no distress  EYES: Eyes grossly normal to inspection, PERRL and conjunctivae and sclerae normal  HENT: ear canals and TM's normal, nose and mouth without ulcers or lesions  NECK: no adenopathy, no asymmetry, masses, or scars  RESP: lungs clear to auscultation - no rales, rhonchi or wheezes  CV: regular rate and rhythm, normal S1 S2, no S3 or S4, no murmur, click or rub, no peripheral edema  ABDOMEN: soft, nontender, no hepatosplenomegaly, no masses and bowel sounds normal  MS: no gross musculoskeletal defects noted, no edema  SKIN: no suspicious lesions or rashes  NEURO: Normal strength and tone, mentation intact and speech normal  PSYCH: mentation appears normal, affect normal/bright        Signed Electronically by: Xiao Bermudez PA-C        "

## 2025-07-02 NOTE — PATIENT INSTRUCTIONS
Check with your  records when your last Tetanus shot was.  Last on file here was 1/21/2015.  You should have one every 10 years.     I recommend you stop singulair x 6 months and see if this affects any of the sleep issues (as this is a known side effect)      Switch adderall to vyvanse.  Message me if you feel this works better or worse then adderall in a couple weeks (don't message me the week of 7/21/25).    Schedule echo with bubble.    Please call [phone] to schedule cardiology services.     Checking blood work today that was advised by pulmonary specialist for the nighttime hypoxia.    If labs and echo normal, then I suggest you do a consult with a sleep specialist to determine next steps to help with nighttime symptoms.         Patient Education   Preventive Care Advice   This is general advice given by our system to help you stay healthy. However, your care team may have specific advice just for you. Please talk to your care team about your preventive care needs.  Nutrition  Eat 5 or more servings of fruits and vegetables each day.  Try wheat bread, brown rice and whole grain pasta (instead of white bread, rice, and pasta).  Get enough calcium and vitamin D. Check the label on foods and aim for 100% of the RDA (recommended daily allowance).  Lifestyle  Exercise at least 150 minutes each week  (30 minutes a day, 5 days a week).  Do muscle strengthening activities 2 days a week. These help control your weight and prevent disease.  No smoking.  Wear sunscreen to prevent skin cancer.  Have a dental exam and cleaning every 6 months.  Yearly exams  See your health care team every year to talk about:  Any changes in your health.  Any medicines your care team has prescribed.  Preventive care, family planning, and ways to prevent chronic diseases.  Shots (vaccines)   HPV shots (up to age 26), if you've never had them before.  Hepatitis B shots (up to age 59), if you've never had them before.  COVID-19  shot: Get this shot when it's due.  Flu shot: Get a flu shot every year.  Tetanus shot: Get a tetanus shot every 10 years.  Pneumococcal, hepatitis A, and RSV shots: Ask your care team if you need these based on your risk.  Shingles shot (for age 50 and up)  General health tests  Diabetes screening:  Starting at age 35, Get screened for diabetes at least every 3 years.  If you are younger than age 35, ask your care team if you should be screened for diabetes.  Cholesterol test: At age 39, start having a cholesterol test every 5 years, or more often if advised.  Bone density scan (DEXA): At age 50, ask your care team if you should have this scan for osteoporosis (brittle bones).  Hepatitis C: Get tested at least once in your life.  STIs (sexually transmitted infections)  Before age 24: Ask your care team if you should be screened for STIs.  After age 24: Get screened for STIs if you're at risk. You are at risk for STIs (including HIV) if:  You are sexually active with more than one person.  You don't use condoms every time.  You or a partner was diagnosed with a sexually transmitted infection.  If you are at risk for HIV, ask about PrEP medicine to prevent HIV.  Get tested for HIV at least once in your life, whether you are at risk for HIV or not.  Cancer screening tests  Cervical cancer screening: If you have a cervix, begin getting regular cervical cancer screening tests starting at age 21.  Breast cancer scan (mammogram): If you've ever had breasts, begin having regular mammograms starting at age 40. This is a scan to check for breast cancer.  Colon cancer screening: It is important to start screening for colon cancer at age 45.  Have a colonoscopy test every 10 years (or more often if you're at risk) Or, ask your provider about stool tests like a FIT test every year or Cologuard test every 3 years.  To learn more about your testing options, visit:   .  For help making a decision, visit:    https://[employer]/ec82971.  Prostate cancer screening test: If you have a prostate, ask your care team if a prostate cancer screening test (PSA) at age 55 is right for you.  Lung cancer screening: If you are a current or former smoker ages 50 to 80, ask your care team if ongoing lung cancer screenings are right for you.  For informational purposes only. Not to replace the advice of your health care provider. Copyright   2023 [facility]. All rights reserved. Clinically reviewed by the [facility]. [employer] 198109 - REV 01/24.  Learning About Stress  What is stress?     Stress is your body's response to a hard situation. Your body can have a physical, emotional, or mental response. Stress is a fact of life for most people, and it affects everyone differently. What causes stress for you may not be stressful for someone else.  A lot of things can cause stress. You may feel stress when you go on a job interview, take a test, or run a race. This kind of short-term stress is normal and even useful. It can help you if you need to work hard or react quickly. For example, stress can help you finish an important job on time.  Long-term stress is caused by ongoing stressful situations or events. Examples of long-term stress include long-term health problems, ongoing problems at work, or conflicts in your family. Long-term stress can harm your health.  How does stress affect your health?  When you are stressed, your body responds as though you are in danger. It makes hormones that speed up your heart, make you breathe faster, and give you a burst of energy. This is called the fight-or-flight stress response. If the stress is over quickly, your body goes back to normal and no harm is done.  But if stress happens too often or lasts too long, it can have bad effects. Long-term stress can make you more likely to get sick, and it can make symptoms of some diseases worse. If you tense up when  you are stressed, you may develop neck, shoulder, or low back pain. Stress is linked to high blood pressure and heart disease.  Stress also harms your emotional health. It can make you rushing, tense, or depressed. Your relationships may suffer, and you may not do well at work or school.  What can you do to manage stress?  You can try these things to help manage stress:   Do something active. Exercise or activity can help reduce stress. Walking is a great way to get started. Even everyday activities such as housecleaning or yard work can help.  Try yoga or neris chi. These techniques combine exercise and meditation. You may need some training at first to learn them.  Do something you enjoy. For example, listen to music or go to a movie. Practice your hobby or do volunteer work.  Meditate. This can help you relax, because you are not worrying about what happened before or what may happen in the future.  Do guided imagery. Imagine yourself in any setting that helps you feel calm. You can use online videos, books, or a teacher to guide you.  Do breathing exercises. For example:  From a standing position, bend forward from the waist with your knees slightly bent. Let your arms dangle close to the floor.  Breathe in slowly and deeply as you return to a standing position. Roll up slowly and lift your head last.  Hold your breath for just a few seconds in the standing position.  Breathe out slowly and bend forward from the waist.  Let your feelings out. Talk, laugh, cry, and express anger when you need to. Talking with supportive friends or family, a counselor, or a vel leader about your feelings is a healthy way to relieve stress. Avoid discussing your feelings with people who make you feel worse.  Write. It may help to write about things that are bothering you. This helps you find out how much stress you feel and what is causing it. When you know this, you can find better ways to cope.  What can you do to prevent  "stress?  You might try some of these things to help prevent stress:  Manage your time. This helps you find time to do the things you want and need to do.  Get enough sleep. Your body recovers from the stresses of the day while you are sleeping.  Get support. Your family, friends, and community can make a difference in how you experience stress.  Limit your news feed. Avoid or limit time on social media or news that may make you feel stressed.  Do something active. Exercise or activity can help reduce stress. Walking is a great way to get started.  Where can you learn more?  Go to https://www.[employer].net/patiented  Enter N032 in the search box to learn more about \"Learning About Stress.\"  Current as of: October 24, 2024  Content Version: 14.5 2024-2025 [employer].   Care instructions adapted under license by your healthcare professional. If you have questions about a medical condition or this instruction, always ask your healthcare professional. [employer] disclaims any warranty or liability for your use of this information.       "

## 2025-07-02 NOTE — LETTER
[facility]  07/02/25  Patient: Chago Soni  YOB: 1995  Medical Record Number: [id number]                                                                                  Non-Opioid Controlled Substance Agreement    This is an agreement between you and your provider regarding safe and appropriate use of controlled substances prescribed by your care team. Controlled substances are?medicines that can cause physical and mental dependence (abuse).     There are strict laws about having and using these medicines. We here at [facility] are  committed to working with you in your efforts to get better. To support you in this work, we'll help you schedule regular office appointments for medicine refills. If we must cancel or change your appointment for any reason, we'll make sure you have enough medicine to last until your next appointment.     As a Provider, I will:   Listen carefully to your concerns while treating you with respect.   Recommend a treatment plan that I believe is in your best interest and may involve therapies other than medicine.    Talk with you often about the possible benefits and the risk of harm of any medicine that we prescribe for you.  Assess the safety of this medicine and check how well it works.    Provide a plan on how to taper (discontinue or go off) using this medicine if the decision is made to stop its use.      ::  As a Patient, I understand controlled substances:     Are prescribed by my care provider to help me function or work and manage my condition(s).?  Are strong medicines and can cause serious side effects.     Need to be taken exactly as prescribed.?Combining controlled substances with certain medicines or chemicals (such as illegal drugs, alcohol, sedatives, sleeping pills, and benzodiazepines) can be dangerous or even fatal.? If I stop taking my medicines suddenly, I may have severe withdrawal symptoms.     The risks, benefits, and side  effects of these medicine(s) were explained to me. I agree that:    I will take part in other treatments as advised by my care team. This may be psychiatry or counseling, physical therapy, behavioral therapy, group treatment or a referral to specialist.    I will keep all my appointments and understand this is part of the monitoring of controlled substances.?My care team may require an office visit for EVERY controlled substance refill. If I miss appointments or don t follow instructions, my care team may stop my medicine    I will take my medicines as prescribed. I will not change the dose or schedule unless my care team tells me to. There will be no refills if I run out early.      I may be asked to come to the clinic and complete a urine drug test or complete a pill count. If I don t give a urine sample or participate in a pill count, the care team may stop my medicine.    I will only receive controlled substance prescriptions from this clinic. If I am treated by another provider, I will tell them that I am taking controlled substances and that I have a treatment agreement with this provider. I will inform my [facility] care team within one business day if I am given a prescription for any controlled substance by another healthcare provider. My [facility] care team can contact other providers and pharmacists about my use of any medicines.    It is up to me to make sure that I don't run out of my medicines on weekends or holidays.?If my care team is willing to refill my prescription without a visit, I must request refills only during office hours. Refills may take up to 3 business days to process. I will use one pharmacy to fill all my controlled substance prescriptions. I will notify the clinic about any changes to my insurance or medicine availability.    I am responsible for my prescriptions. If the medicine/prescription is lost, stolen or destroyed, it will not be replaced.?I also agree not to  share controlled substance medicines with anyone.     I am aware I should not use any illegal or recreational drugs. I agree not to drink alcohol unless my care team says I can.     If I enroll in the [place] Medical Cannabis program, I will tell my care team before my next refill.    I will tell my care team right away if I become pregnant, have a new medical problem treated outside of my regular clinic, or have a change in my medicines.     I understand that this medicine can affect my thinking, judgment and reaction time.? Alcohol and drugs affect the brain and body, which can affect the safety of my driving. Being under the influence of alcohol or drugs can affect my decision-making, behaviors, personal safety and the safety of others. Driving while impaired (DWI) can occur if a person is driving, operating or in physical control of a car, motorcycle, boat, snowmobile, ATV, motorbike, off-road vehicle or any other motor vehicle ([place] Statute 169A.20). I understand the risk if I choose to drive or operate any vehicle or machinery.    I understand that if I do not follow any of the conditions above, my prescriptions or treatment may be stopped or changed.   I agree that my provider, clinic care team and pharmacy may work with any city, state or federal law enforcement agency that investigates the misuse, sale or other diversion of my controlled medicine. I will allow my provider to discuss my care with, or share a copy of, this agreement with any other treating provider, pharmacy or emergency room where I receive care.     I have read this agreement and have asked questions about anything I did not understand.    ________________________________________________________  Patient Signature - Chaog Soni     ___________________                   Date     ________________________________________________________  Provider Signature - Xiao Bermudez PA-C       ___________________                   Date      ________________________________________________________  Witness Signature (required if provider not present while patient signing)          ___________________                   Date

## 2025-07-03 ENCOUNTER — PATIENT OUTREACH (OUTPATIENT)
Dept: CARE COORDINATION | Facility: CLINIC | Age: 30
End: 2025-07-03
Payer: COMMERCIAL

## 2025-07-03 ENCOUNTER — TELEPHONE (OUTPATIENT)
Dept: FAMILY MEDICINE | Facility: CLINIC | Age: 30
End: 2025-07-03
Payer: COMMERCIAL

## 2025-07-03 DIAGNOSIS — F90.2 ATTENTION DEFICIT HYPERACTIVITY DISORDER (ADHD), COMBINED TYPE: Primary | ICD-10-CM

## 2025-07-03 LAB
ALBUMIN SERPL BCG-MCNC: 4.9 G/DL (ref 3.5–5.2)
ALP SERPL-CCNC: 58 U/L (ref 40–150)
ALT SERPL W P-5'-P-CCNC: 18 U/L (ref 0–70)
ANION GAP SERPL CALCULATED.3IONS-SCNC: 9 MMOL/L (ref 7–15)
AST SERPL W P-5'-P-CCNC: 24 U/L (ref 0–45)
BILIRUB SERPL-MCNC: 2.4 MG/DL
BUN SERPL-MCNC: 15.2 MG/DL (ref 6–20)
CALCIUM SERPL-MCNC: 9.9 MG/DL (ref 8.8–10.4)
CHLORIDE SERPL-SCNC: 101 MMOL/L (ref 98–107)
CHOLEST SERPL-MCNC: 188 MG/DL
CREAT SERPL-MCNC: 1.09 MG/DL (ref 0.67–1.17)
EGFRCR SERPLBLD CKD-EPI 2021: >90 ML/MIN/1.73M2
ENA SCL70 IGG SER IA-ACNC: 0.9 U/ML
ENA SCL70 IGG SER IA-ACNC: NEGATIVE
FASTING STATUS PATIENT QL REPORTED: YES
FASTING STATUS PATIENT QL REPORTED: YES
GLUCOSE SERPL-MCNC: 91 MG/DL (ref 70–99)
HCO3 SERPL-SCNC: 29 MMOL/L (ref 22–29)
HDLC SERPL-MCNC: 74 MG/DL
LDLC SERPL CALC-MCNC: 101 MG/DL
NONHDLC SERPL-MCNC: 114 MG/DL
POTASSIUM SERPL-SCNC: 4.5 MMOL/L (ref 3.4–5.3)
PROT SERPL-MCNC: 7.6 G/DL (ref 6.4–8.3)
RHEUMATOID FACT SERPL-ACNC: <10 IU/ML
SODIUM SERPL-SCNC: 139 MMOL/L (ref 135–145)
TRIGL SERPL-MCNC: 63 MG/DL

## 2025-07-03 RX ORDER — METHYLPHENIDATE HYDROCHLORIDE 18 MG/1
18 TABLET ORAL EVERY MORNING
Qty: 30 TABLET | Refills: 0 | Status: SHIPPED | OUTPATIENT
Start: 2025-07-03

## 2025-07-03 NOTE — TELEPHONE ENCOUNTER
PRIOR AUTHORIZATION DENIED    Medication: LISDEXAMFETAMINE DIMESYLATE 30 MG PO CAPS  Insurance Company: [employer] - Phone [phone] Fax [phone]  Denial Date: 7/3/2025  Denial Reason(s):     Appeal Information: IF APPEALING DENIAL PATIENT WOULD NEED TO COMPLETE CONSENT FORM SEE BELOW      Patient Notified: No

## 2025-07-07 ENCOUNTER — PATIENT OUTREACH (OUTPATIENT)
Dept: CARE COORDINATION | Facility: CLINIC | Age: 30
End: 2025-07-07
Payer: COMMERCIAL

## 2025-08-04 ENCOUNTER — HOSPITAL ENCOUNTER (OUTPATIENT)
Dept: CARDIOLOGY | Facility: CLINIC | Age: 30
Discharge: HOME | End: 2025-08-04
Attending: PHYSICIAN ASSISTANT | Admitting: PHYSICIAN ASSISTANT
Payer: COMMERCIAL

## 2025-08-04 DIAGNOSIS — G47.36 HYPOXEMIA ASSOCIATED WITH SLEEP: ICD-10-CM

## 2025-08-04 LAB — LVEF ECHO: NORMAL

## 2025-08-04 PROCEDURE — 93306 TTE W/DOPPLER COMPLETE: CPT | Mod: 26 | Performed by: INTERNAL MEDICINE

## 2025-08-04 PROCEDURE — 258N000001 HC RX 258: Performed by: INTERNAL MEDICINE

## 2025-08-04 PROCEDURE — 999N000208 ECHOCARDIOGRAM COMPLETE

## 2025-08-04 RX ORDER — ACYCLOVIR 200 MG/1
12 CAPSULE ORAL ONCE
Status: COMPLETED | OUTPATIENT
Start: 2025-08-04 | End: 2025-08-04

## 2025-08-04 RX ADMIN — SODIUM CHLORIDE 12 ML: 9 INJECTION, SOLUTION INTRAMUSCULAR; INTRAVENOUS; SUBCUTANEOUS at 15:24

## 2025-08-12 ENCOUNTER — TELEPHONE (OUTPATIENT)
Dept: CARDIOLOGY | Facility: CLINIC | Age: 30
End: 2025-08-12
Payer: COMMERCIAL

## 2025-08-13 ENCOUNTER — TELEPHONE (OUTPATIENT)
Dept: CARDIOLOGY | Facility: CLINIC | Age: 30
End: 2025-08-13
Payer: COMMERCIAL